# Patient Record
Sex: FEMALE | Race: WHITE | URBAN - METROPOLITAN AREA
[De-identification: names, ages, dates, MRNs, and addresses within clinical notes are randomized per-mention and may not be internally consistent; named-entity substitution may affect disease eponyms.]

---

## 2018-04-18 ENCOUNTER — DOCTOR'S OFFICE (OUTPATIENT)
Dept: URBAN - METROPOLITAN AREA CLINIC 137 | Facility: CLINIC | Age: 63
Setting detail: OPHTHALMOLOGY
End: 2018-04-18
Payer: COMMERCIAL

## 2018-04-18 ENCOUNTER — RX ONLY (RX ONLY)
Age: 63
End: 2018-04-18

## 2018-04-18 DIAGNOSIS — H52.13: ICD-10-CM

## 2018-04-18 PROCEDURE — 92004 COMPRE OPH EXAM NEW PT 1/>: CPT | Performed by: OPHTHALMOLOGY

## 2018-04-18 ASSESSMENT — REFRACTION_MANIFEST
OS_VA1: 20/
OS_VA3: 20/
OD_VA2: 20/
OD_VA2: 20/
OS_VA2: 20/
OU_VA: 20/
OS_VA1: 20/
OD_VA1: 20/
OD_VA1: 20/
OU_VA: 20/
OD_VA3: 20/
OD_VA3: 20/
OS_VA2: 20/
OS_VA3: 20/

## 2018-04-18 ASSESSMENT — REFRACTION_AUTOREFRACTION
OS_CYLINDER: +0.50
OD_SPHERE: -6.25
OS_AXIS: 110
OS_SPHERE: -6.50
OD_CYLINDER: SPH

## 2018-04-18 ASSESSMENT — REFRACTION_OUTSIDERX
OS_VA3: 20/
OD_VA2: 20/25(J1)
OD_SPHERE: -7.00
OS_ADD: +2.25
OU_VA: 20/25
OD_VA1: 20/30-1
OD_VA3: 20/
OD_ADD: +2.25
OS_SPHERE: -6.00
OS_VA1: 20/25-1
OS_VA2: 20/25(J1)

## 2018-04-18 ASSESSMENT — REFRACTION_CURRENTRX
OS_OVR_VA: 20/
OD_OVR_VA: 20/
OD_OVR_VA: 20/
OS_OVR_VA: 20/
OD_OVR_VA: 20/
OS_OVR_VA: 20/

## 2018-04-18 ASSESSMENT — CONFRONTATIONAL VISUAL FIELD TEST (CVF)
OD_FINDINGS: FULL
OS_FINDINGS: FULL

## 2018-04-18 ASSESSMENT — VISUAL ACUITY
OS_BCVA: 20/80+1
OD_BCVA: 20/50

## 2018-04-18 ASSESSMENT — SPHEQUIV_DERIVED: OS_SPHEQUIV: -6.25

## 2018-05-07 ENCOUNTER — DOCTOR'S OFFICE (OUTPATIENT)
Dept: URBAN - METROPOLITAN AREA CLINIC 137 | Facility: CLINIC | Age: 63
Setting detail: OPHTHALMOLOGY
End: 2018-05-07
Payer: COMMERCIAL

## 2018-05-07 DIAGNOSIS — H52.13: ICD-10-CM

## 2018-05-07 PROCEDURE — 92310 CONTACT LENS FITTING OU: CPT | Performed by: OPHTHALMOLOGY

## 2018-05-07 ASSESSMENT — REFRACTION_MANIFEST
OU_VA: 20/
OD_VA3: 20/
OD_VA1: 20/
OS_VA2: 20/
OS_VA2: 20/
OS_VA3: 20/
OD_VA1: 20/
OS_VA3: 20/
OD_VA3: 20/
OD_VA2: 20/
OS_VA1: 20/
OD_VA2: 20/
OS_VA1: 20/
OU_VA: 20/

## 2018-05-07 ASSESSMENT — REFRACTION_CURRENTRX
OS_OVR_VA: 20/
OD_OVR_VA: 20/

## 2018-05-07 ASSESSMENT — VISUAL ACUITY
OD_BCVA: 20/30-2
OS_BCVA: 20/30+2

## 2018-05-07 ASSESSMENT — REFRACTION_OUTSIDERX
OS_VA2: 20/25(J1)
OS_SPHERE: -6.00
OU_VA: 20/25
OS_ADD: +2.25
OD_VA1: 20/30-1
OD_VA2: 20/25(J1)
OS_VA1: 20/25-1
OD_VA3: 20/
OD_SPHERE: -7.00
OD_ADD: +2.25
OS_VA3: 20/

## 2018-05-07 ASSESSMENT — CONFRONTATIONAL VISUAL FIELD TEST (CVF)
OD_FINDINGS: FULL
OS_FINDINGS: FULL

## 2018-05-07 ASSESSMENT — REFRACTION_AUTOREFRACTION
OS_CYLINDER: +0.50
OD_SPHERE: -6.25
OD_CYLINDER: SPH
OS_AXIS: 110
OS_SPHERE: -6.50

## 2018-05-07 ASSESSMENT — SPHEQUIV_DERIVED: OS_SPHEQUIV: -6.25

## 2018-05-15 ENCOUNTER — DOCTOR'S OFFICE (OUTPATIENT)
Dept: URBAN - METROPOLITAN AREA CLINIC 136 | Facility: CLINIC | Age: 63
Setting detail: OPHTHALMOLOGY
End: 2018-05-15
Payer: COMMERCIAL

## 2018-05-15 DIAGNOSIS — H25.012: ICD-10-CM

## 2018-05-15 DIAGNOSIS — H43.811: ICD-10-CM

## 2018-05-15 DIAGNOSIS — H25.011: ICD-10-CM

## 2018-05-15 DIAGNOSIS — H04.123: ICD-10-CM

## 2018-05-15 PROCEDURE — 92014 COMPRE OPH EXAM EST PT 1/>: CPT | Performed by: OPHTHALMOLOGY

## 2018-05-15 ASSESSMENT — AXIALLENGTH_DERIVED
OD_AL: 20.7
OS_AL: 25.2256

## 2018-05-15 ASSESSMENT — REFRACTION_MANIFEST
OD_VA2: 20/
OD_VA2: 20/
OD_VA1: 20/
OD_VA3: 20/
OS_VA3: 20/
OS_VA1: 20/
OU_VA: 20/
OD_VA3: 20/
OD_VA1: 20/
OS_VA2: 20/
OU_VA: 20/
OS_VA3: 20/
OS_VA1: 20/
OS_VA2: 20/

## 2018-05-15 ASSESSMENT — SPHEQUIV_DERIVED
OS_SPHEQUIV: -6.75
OD_SPHEQUIV: -6.625

## 2018-05-15 ASSESSMENT — REFRACTION_CURRENTRX
OS_OVR_VA: 20/
OD_OVR_VA: 20/
OD_OVR_VA: 20/
OS_OVR_VA: 20/
OD_OVR_VA: 20/
OS_OVR_VA: 20/

## 2018-05-15 ASSESSMENT — REFRACTION_OUTSIDERX
OS_ADD: +2.25
OS_SPHERE: -6.00
OS_VA1: 20/25-1
OD_ADD: +2.25
OD_VA1: 20/30-1
OD_VA2: 20/25(J1)
OD_VA3: 20/
OU_VA: 20/25
OS_VA3: 20/
OD_SPHERE: -7.00
OS_VA2: 20/25(J1)

## 2018-05-15 ASSESSMENT — KERATOMETRY
OS_K1POWER_DIOPTERS: 45.75
OD_AXISANGLE_DEGREES: 110
OS_K2POWER_DIOPTERS: 47.25
OS_AXISANGLE_DEGREES: 078
METHOD_AUTO_MANUAL: AUTO
OD_K2POWER_DIOPTERS: 74.25
OD_K1POWER_DIOPTERS: 44.75

## 2018-05-15 ASSESSMENT — REFRACTION_AUTOREFRACTION
OD_CYLINDER: -1.75
OD_AXIS: 069
OS_AXIS: 171
OS_CYLINDER: -1.00
OD_SPHERE: -5.75
OS_SPHERE: -6.25

## 2018-05-15 ASSESSMENT — VISUAL ACUITY
OD_BCVA: 20/25
OS_BCVA: 20/30-1

## 2018-05-15 ASSESSMENT — CONFRONTATIONAL VISUAL FIELD TEST (CVF)
OS_FINDINGS: FULL
OD_FINDINGS: FULL

## 2018-06-01 ENCOUNTER — DOCTOR'S OFFICE (OUTPATIENT)
Dept: URBAN - METROPOLITAN AREA CLINIC 136 | Facility: CLINIC | Age: 63
Setting detail: OPHTHALMOLOGY
End: 2018-06-01
Payer: COMMERCIAL

## 2018-06-01 DIAGNOSIS — H25.011: ICD-10-CM

## 2018-06-01 PROCEDURE — CATARACT K CATARACT KIT: Performed by: OPHTHALMOLOGY

## 2018-06-01 PROCEDURE — 76519 ECHO EXAM OF EYE: CPT | Performed by: OPHTHALMOLOGY

## 2018-07-10 ENCOUNTER — DOCTOR'S OFFICE (OUTPATIENT)
Dept: URBAN - METROPOLITAN AREA CLINIC 137 | Facility: CLINIC | Age: 63
Setting detail: OPHTHALMOLOGY
End: 2018-07-10
Payer: COMMERCIAL

## 2018-07-10 DIAGNOSIS — H04.123: ICD-10-CM

## 2018-07-10 PROCEDURE — 99212 OFFICE O/P EST SF 10 MIN: CPT | Performed by: OPHTHALMOLOGY

## 2018-07-10 ASSESSMENT — REFRACTION_MANIFEST
OD_VA1: 20/
OD_VA2: 20/
OS_VA2: 20/
OD_VA3: 20/
OS_VA3: 20/
OD_VA3: 20/
OU_VA: 20/
OD_VA2: 20/
OU_VA: 20/
OS_VA2: 20/
OD_VA1: 20/
OS_VA1: 20/
OS_VA1: 20/
OS_VA3: 20/

## 2018-07-10 ASSESSMENT — REFRACTION_OUTSIDERX
OD_VA2: 20/25(J1)
OU_VA: 20/25
OD_VA3: 20/
OD_ADD: +2.25
OS_VA1: 20/25-1
OD_SPHERE: -7.00
OS_SPHERE: -6.00
OD_VA1: 20/30-1
OS_ADD: +2.25
OS_VA2: 20/25(J1)
OS_VA3: 20/

## 2018-07-10 ASSESSMENT — REFRACTION_CURRENTRX
OD_OVR_VA: 20/
OS_OVR_VA: 20/
OD_OVR_VA: 20/
OD_OVR_VA: 20/

## 2018-07-10 ASSESSMENT — REFRACTION_AUTOREFRACTION
OD_AXIS: 069
OS_CYLINDER: -1.00
OD_SPHERE: -5.75
OD_CYLINDER: -1.75
OS_AXIS: 171
OS_SPHERE: -6.25

## 2018-07-10 ASSESSMENT — CONFRONTATIONAL VISUAL FIELD TEST (CVF)
OS_FINDINGS: FULL
OD_FINDINGS: FULL

## 2018-07-10 ASSESSMENT — VISUAL ACUITY
OD_BCVA: 20/60-1
OS_BCVA: 20/30-1

## 2018-07-10 ASSESSMENT — SPHEQUIV_DERIVED
OS_SPHEQUIV: -6.75
OD_SPHEQUIV: -6.625

## 2018-07-17 ENCOUNTER — RX ONLY (RX ONLY)
Age: 63
End: 2018-07-17

## 2018-07-17 ENCOUNTER — DOCTOR'S OFFICE (OUTPATIENT)
Dept: URBAN - METROPOLITAN AREA CLINIC 136 | Facility: CLINIC | Age: 63
Setting detail: OPHTHALMOLOGY
End: 2018-07-17
Payer: COMMERCIAL

## 2018-07-17 DIAGNOSIS — H04.123: ICD-10-CM

## 2018-07-17 DIAGNOSIS — H25.011: ICD-10-CM

## 2018-07-17 DIAGNOSIS — H25.012: ICD-10-CM

## 2018-07-17 PROCEDURE — 92012 INTRM OPH EXAM EST PATIENT: CPT | Performed by: OPHTHALMOLOGY

## 2018-07-17 ASSESSMENT — CONFRONTATIONAL VISUAL FIELD TEST (CVF)
OD_FINDINGS: FULL
OS_FINDINGS: FULL

## 2018-07-17 ASSESSMENT — REFRACTION_CURRENTRX
OS_OVR_VA: 20/
OS_OVR_VA: 20/
OD_OVR_VA: 20/
OD_OVR_VA: 20/
OS_OVR_VA: 20/
OD_OVR_VA: 20/

## 2018-07-17 ASSESSMENT — REFRACTION_OUTSIDERX
OS_VA2: 20/25(J1)
OD_VA2: 20/25(J1)
OD_VA3: 20/
OD_SPHERE: -7.00
OS_ADD: +2.25
OS_SPHERE: -6.00
OD_VA1: 20/30-1
OS_VA1: 20/25-1
OU_VA: 20/25
OS_VA3: 20/
OD_ADD: +2.25

## 2018-07-17 ASSESSMENT — REFRACTION_MANIFEST
OS_VA2: 20/
OD_VA1: 20/
OD_VA3: 20/
OU_VA: 20/
OD_VA2: 20/
OD_VA2: 20/
OD_VA1: 20/
OS_VA1: 20/
OS_VA1: 20/
OS_VA3: 20/
OS_VA3: 20/
OU_VA: 20/
OS_VA2: 20/
OD_VA3: 20/

## 2018-07-17 ASSESSMENT — SUPERFICIAL PUNCTATE KERATITIS (SPK): OD_SPK: T

## 2018-07-17 ASSESSMENT — REFRACTION_AUTOREFRACTION
OS_AXIS: 171
OD_AXIS: 069
OS_SPHERE: -6.25
OD_SPHERE: -5.75
OS_CYLINDER: -1.00
OD_CYLINDER: -1.75

## 2018-07-17 ASSESSMENT — VISUAL ACUITY
OS_BCVA: 20/50-1
OD_BCVA: 20/50

## 2018-07-17 ASSESSMENT — SPHEQUIV_DERIVED
OS_SPHEQUIV: -6.75
OD_SPHEQUIV: -6.625

## 2018-07-17 ASSESSMENT — DRY EYES - PHYSICIAN NOTES
OS_GENERALCOMMENTS: LOW TEAR FILM
OD_GENERALCOMMENTS: LOW TEAR FILM

## 2018-07-26 ENCOUNTER — AMBUL SURGICAL CARE (OUTPATIENT)
Dept: URBAN - METROPOLITAN AREA SURGERY 32 | Facility: SURGERY | Age: 63
Setting detail: OPHTHALMOLOGY
End: 2018-07-26
Payer: COMMERCIAL

## 2018-07-26 DIAGNOSIS — H25.11: ICD-10-CM

## 2018-07-26 DIAGNOSIS — H25.011: ICD-10-CM

## 2018-07-26 PROCEDURE — G8918 PT W/O PREOP ORDER IV AB PRO: HCPCS | Performed by: OPHTHALMOLOGY

## 2018-07-26 PROCEDURE — 66984 XCAPSL CTRC RMVL W/O ECP: CPT | Performed by: OPHTHALMOLOGY

## 2018-07-26 PROCEDURE — G8907 PT DOC NO EVENTS ON DISCHARG: HCPCS | Performed by: OPHTHALMOLOGY

## 2018-07-27 ENCOUNTER — DOCTOR'S OFFICE (OUTPATIENT)
Dept: URBAN - METROPOLITAN AREA CLINIC 136 | Facility: CLINIC | Age: 63
Setting detail: OPHTHALMOLOGY
End: 2018-07-27
Payer: COMMERCIAL

## 2018-07-27 ENCOUNTER — RX ONLY (RX ONLY)
Age: 63
End: 2018-07-27

## 2018-07-27 DIAGNOSIS — H25.012: ICD-10-CM

## 2018-07-27 DIAGNOSIS — H25.011: ICD-10-CM

## 2018-07-27 DIAGNOSIS — Z96.1: ICD-10-CM

## 2018-07-27 PROCEDURE — 76519 ECHO EXAM OF EYE: CPT | Performed by: OPHTHALMOLOGY

## 2018-07-27 PROCEDURE — 99024 POSTOP FOLLOW-UP VISIT: CPT | Performed by: PHYSICIAN ASSISTANT

## 2018-07-27 ASSESSMENT — DRY EYES - PHYSICIAN NOTES
OS_GENERALCOMMENTS: LOW TEAR FILM
OD_GENERALCOMMENTS: LOW TEAR FILM

## 2018-07-27 ASSESSMENT — SUPERFICIAL PUNCTATE KERATITIS (SPK): OD_SPK: ABSENT

## 2018-07-30 ASSESSMENT — KERATOMETRY
METHOD_AUTO_MANUAL: AUTO
OD_AXISANGLE_DEGREES: 20
OS_K2POWER_DIOPTERS: 47.25
OD_K1POWER_DIOPTERS: 45.25
OS_AXISANGLE_DEGREES: 078
OS_K1POWER_DIOPTERS: 25
OD_K2POWER_DIOPTERS: 47.25

## 2018-07-30 ASSESSMENT — REFRACTION_MANIFEST
OS_VA1: 20/
OD_VA3: 20/
OD_VA1: 20/
OS_VA3: 20/
OD_VA1: 20/
OS_VA3: 20/
OS_VA1: 20/
OD_VA3: 20/
OD_VA2: 20/
OD_VA2: 20/
OU_VA: 20/
OS_VA2: 20/
OS_VA2: 20/
OU_VA: 20/

## 2018-07-30 ASSESSMENT — SPHEQUIV_DERIVED
OD_SPHEQUIV: 0.375
OS_SPHEQUIV: -6.75

## 2018-07-30 ASSESSMENT — AXIALLENGTH_DERIVED
OD_AL: 22.4897
OS_AL: 30.49

## 2018-07-30 ASSESSMENT — REFRACTION_OUTSIDERX
OS_VA1: 20/25-1
OS_ADD: +2.25
OD_VA1: 20/30-1
OD_VA3: 20/
OD_SPHERE: -7.00
OD_VA2: 20/25(J1)
OS_SPHERE: -6.00
OD_ADD: +2.25
OS_VA2: 20/25(J1)
OU_VA: 20/25
OS_VA3: 20/

## 2018-07-30 ASSESSMENT — REFRACTION_CURRENTRX
OD_OVR_VA: 20/
OD_OVR_VA: 20/
OS_OVR_VA: 20/
OD_OVR_VA: 20/

## 2018-07-30 ASSESSMENT — VISUAL ACUITY
OD_BCVA: 20/50
OS_BCVA: 20/50-1

## 2018-07-30 ASSESSMENT — REFRACTION_AUTOREFRACTION
OD_SPHERE: +1.25
OS_SPHERE: -6.25
OS_AXIS: 171
OD_AXIS: 33
OD_CYLINDER: -1.75
OS_CYLINDER: -1.00

## 2018-07-31 ENCOUNTER — DOCTOR'S OFFICE (OUTPATIENT)
Dept: URBAN - METROPOLITAN AREA CLINIC 136 | Facility: CLINIC | Age: 63
Setting detail: OPHTHALMOLOGY
End: 2018-07-31
Payer: COMMERCIAL

## 2018-07-31 DIAGNOSIS — H25.011: ICD-10-CM

## 2018-07-31 DIAGNOSIS — H25.012: ICD-10-CM

## 2018-07-31 DIAGNOSIS — Z96.1: ICD-10-CM

## 2018-07-31 PROCEDURE — 99024 POSTOP FOLLOW-UP VISIT: CPT | Performed by: OPHTHALMOLOGY

## 2018-07-31 ASSESSMENT — REFRACTION_OUTSIDERX
OD_ADD: +2.25
OD_VA2: 20/25(J1)
OD_VA3: 20/
OS_SPHERE: -6.00
OS_VA3: 20/
OS_ADD: +2.25
OS_VA2: 20/25(J1)
OD_VA1: 20/30-1
OU_VA: 20/25
OD_SPHERE: -7.00
OS_VA1: 20/25-1

## 2018-07-31 ASSESSMENT — REFRACTION_AUTOREFRACTION
OD_AXIS: 33
OS_CYLINDER: -1.00
OS_SPHERE: -6.25
OD_SPHERE: +1.25
OD_CYLINDER: -1.75
OS_AXIS: 171

## 2018-07-31 ASSESSMENT — REFRACTION_CURRENTRX
OD_OVR_VA: 20/
OD_OVR_VA: 20/
OS_OVR_VA: 20/
OD_OVR_VA: 20/

## 2018-07-31 ASSESSMENT — REFRACTION_MANIFEST
OS_VA1: 20/
OD_VA1: 20/
OU_VA: 20/
OS_VA3: 20/
OD_VA3: 20/
OU_VA: 20/
OD_VA1: 20/
OS_VA3: 20/
OS_VA1: 20/
OD_VA2: 20/
OS_VA2: 20/
OS_VA2: 20/
OD_VA2: 20/
OD_VA3: 20/

## 2018-07-31 ASSESSMENT — SPHEQUIV_DERIVED
OS_SPHEQUIV: -6.75
OD_SPHEQUIV: 0.375

## 2018-07-31 ASSESSMENT — DRY EYES - PHYSICIAN NOTES
OD_GENERALCOMMENTS: LOW TEAR FILM
OS_GENERALCOMMENTS: LOW TEAR FILM

## 2018-07-31 ASSESSMENT — VISUAL ACUITY
OS_BCVA: 20/30-1
OD_BCVA: CF 3FT

## 2018-07-31 ASSESSMENT — CONFRONTATIONAL VISUAL FIELD TEST (CVF)
OD_FINDINGS: FULL
OS_FINDINGS: FULL

## 2018-07-31 ASSESSMENT — SUPERFICIAL PUNCTATE KERATITIS (SPK): OD_SPK: 1+ 2+

## 2018-08-01 ENCOUNTER — AMBUL SURGICAL CARE (OUTPATIENT)
Dept: URBAN - METROPOLITAN AREA SURGERY 32 | Facility: SURGERY | Age: 63
Setting detail: OPHTHALMOLOGY
End: 2018-08-01
Payer: COMMERCIAL

## 2018-08-01 DIAGNOSIS — H25.12: ICD-10-CM

## 2018-08-01 DIAGNOSIS — H25.012: ICD-10-CM

## 2018-08-01 PROCEDURE — G8918 PT W/O PREOP ORDER IV AB PRO: HCPCS | Performed by: OPHTHALMOLOGY

## 2018-08-01 PROCEDURE — G8907 PT DOC NO EVENTS ON DISCHARG: HCPCS | Performed by: OPHTHALMOLOGY

## 2018-08-01 PROCEDURE — 66984 XCAPSL CTRC RMVL W/O ECP: CPT | Performed by: OPHTHALMOLOGY

## 2018-08-02 ENCOUNTER — DOCTOR'S OFFICE (OUTPATIENT)
Dept: URBAN - METROPOLITAN AREA CLINIC 136 | Facility: CLINIC | Age: 63
Setting detail: OPHTHALMOLOGY
End: 2018-08-02
Payer: COMMERCIAL

## 2018-08-02 DIAGNOSIS — Z96.1: ICD-10-CM

## 2018-08-02 PROBLEM — H25.011 CATARACT; RIGHT EYE, LEFT EYE: Status: RESOLVED | Noted: 2018-05-15 | Resolved: 2018-08-02

## 2018-08-02 PROBLEM — H25.012 CATARACT; RIGHT EYE, LEFT EYE: Status: RESOLVED | Noted: 2018-05-15 | Resolved: 2018-08-02

## 2018-08-02 PROCEDURE — 99024 POSTOP FOLLOW-UP VISIT: CPT | Performed by: OPTOMETRIST

## 2018-08-02 ASSESSMENT — REFRACTION_MANIFEST
OS_VA2: 20/
OD_VA1: 20/
OS_VA3: 20/
OU_VA: 20/
OD_VA1: 20/
OS_VA3: 20/
OS_VA1: 20/
OU_VA: 20/
OD_VA3: 20/
OD_VA2: 20/
OD_VA2: 20/
OD_VA3: 20/
OS_VA2: 20/
OS_VA1: 20/

## 2018-08-02 ASSESSMENT — VISUAL ACUITY
OS_BCVA: 20/30-1
OD_BCVA: 20/20-2

## 2018-08-02 ASSESSMENT — REFRACTION_OUTSIDERX
OS_ADD: +2.25
OS_SPHERE: -6.00
OS_VA3: 20/
OS_VA2: 20/25(J1)
OS_VA1: 20/25-1
OD_VA3: 20/
OD_SPHERE: -7.00
OU_VA: 20/25
OD_VA1: 20/30-1
OD_ADD: +2.25
OD_VA2: 20/25(J1)

## 2018-08-02 ASSESSMENT — REFRACTION_AUTOREFRACTION
OS_SPHERE: +0.25
OD_SPHERE: +1.00
OS_AXIS: 156
OD_AXIS: 27
OD_CYLINDER: -1.50
OS_CYLINDER: -0.75

## 2018-08-02 ASSESSMENT — KERATOMETRY
OS_K2POWER_DIOPTERS: 47.25
METHOD_AUTO_MANUAL: AUTO
OS_AXISANGLE_DEGREES: 078
OD_AXISANGLE_DEGREES: 20
OD_K1POWER_DIOPTERS: 45.25
OD_K2POWER_DIOPTERS: 47.25
OS_K1POWER_DIOPTERS: 25

## 2018-08-02 ASSESSMENT — REFRACTION_CURRENTRX
OS_OVR_VA: 20/
OS_OVR_VA: 20/
OD_OVR_VA: 20/
OD_OVR_VA: 20/
OS_OVR_VA: 20/
OD_OVR_VA: 20/

## 2018-08-02 ASSESSMENT — AXIALLENGTH_DERIVED
OS_AL: 26.71
OD_AL: 22.5341

## 2018-08-02 ASSESSMENT — DRY EYES - PHYSICIAN NOTES
OD_GENERALCOMMENTS: LOW TEAR FILM
OS_GENERALCOMMENTS: LOW TEAR FILM

## 2018-08-02 ASSESSMENT — SPHEQUIV_DERIVED
OS_SPHEQUIV: -0.125
OD_SPHEQUIV: 0.25

## 2018-08-02 ASSESSMENT — SUPERFICIAL PUNCTATE KERATITIS (SPK): OD_SPK: 1+ 2+

## 2018-08-07 ENCOUNTER — DOCTOR'S OFFICE (OUTPATIENT)
Dept: URBAN - METROPOLITAN AREA CLINIC 136 | Facility: CLINIC | Age: 63
Setting detail: OPHTHALMOLOGY
End: 2018-08-07
Payer: COMMERCIAL

## 2018-08-07 DIAGNOSIS — Z96.1: ICD-10-CM

## 2018-08-07 PROCEDURE — 99024 POSTOP FOLLOW-UP VISIT: CPT | Performed by: OPHTHALMOLOGY

## 2018-08-07 ASSESSMENT — REFRACTION_MANIFEST
OD_VA3: 20/
OD_VA2: 20/
OD_VA1: 20/
OS_VA3: 20/
OS_VA2: 20/
OS_VA3: 20/
OU_VA: 20/
OS_VA2: 20/
OS_VA1: 20/
OD_VA1: 20/
OS_VA1: 20/
OD_VA2: 20/
OU_VA: 20/
OD_VA3: 20/

## 2018-08-07 ASSESSMENT — REFRACTION_AUTOREFRACTION
OD_SPHERE: +0.25
OD_AXIS: 28
OS_SPHERE: -1.00
OD_CYLINDER: -1.75
OS_CYLINDER: -0.75
OS_AXIS: 169

## 2018-08-07 ASSESSMENT — KERATOMETRY
OD_AXISANGLE_DEGREES: 20
METHOD_AUTO_MANUAL: AUTO
OS_AXISANGLE_DEGREES: 078
OD_K2POWER_DIOPTERS: 47.25
OS_K2POWER_DIOPTERS: 47.25
OD_K1POWER_DIOPTERS: 45.25
OS_K1POWER_DIOPTERS: 25

## 2018-08-07 ASSESSMENT — REFRACTION_CURRENTRX
OD_OVR_VA: 20/
OS_OVR_VA: 20/
OD_OVR_VA: 20/
OS_OVR_VA: 20/
OS_OVR_VA: 20/
OD_OVR_VA: 20/

## 2018-08-07 ASSESSMENT — DRY EYES - PHYSICIAN NOTES
OS_GENERALCOMMENTS: LOW TEAR FILM
OD_GENERALCOMMENTS: LOW TEAR FILM

## 2018-08-07 ASSESSMENT — REFRACTION_OUTSIDERX
OS_VA3: 20/
OS_ADD: +2.25
OD_ADD: +2.25
OU_VA: 20/25
OS_VA2: 20/25(J1)
OS_VA1: 20/25-1
OD_VA1: 20/30-1
OD_VA3: 20/
OD_SPHERE: -7.00
OD_VA2: 20/25(J1)
OS_SPHERE: -6.00

## 2018-08-07 ASSESSMENT — SUPERFICIAL PUNCTATE KERATITIS (SPK): OD_SPK: 1+ 2+

## 2018-08-07 ASSESSMENT — AXIALLENGTH_DERIVED
OS_AL: 27.35
OD_AL: 22.8494

## 2018-08-07 ASSESSMENT — VISUAL ACUITY
OS_BCVA: 20/20-2
OD_BCVA: 20/25+2

## 2018-08-07 ASSESSMENT — SPHEQUIV_DERIVED
OS_SPHEQUIV: -1.375
OD_SPHEQUIV: -0.625

## 2018-10-29 ENCOUNTER — DOCTOR'S OFFICE (OUTPATIENT)
Dept: URBAN - METROPOLITAN AREA CLINIC 136 | Facility: CLINIC | Age: 63
Setting detail: OPHTHALMOLOGY
End: 2018-10-29
Payer: COMMERCIAL

## 2018-10-29 ENCOUNTER — RX ONLY (RX ONLY)
Age: 63
End: 2018-10-29

## 2018-10-29 DIAGNOSIS — H04.123: ICD-10-CM

## 2018-10-29 DIAGNOSIS — H43.811: ICD-10-CM

## 2018-10-29 DIAGNOSIS — Z96.1: ICD-10-CM

## 2018-10-29 PROCEDURE — 99024 POSTOP FOLLOW-UP VISIT: CPT | Performed by: OPHTHALMOLOGY

## 2018-10-29 ASSESSMENT — REFRACTION_AUTOREFRACTION
OD_AXIS: 014
OS_SPHERE: -0.50
OD_SPHERE: +1.00
OS_AXIS: 167
OD_CYLINDER: -1.25
OS_CYLINDER: -0.25

## 2018-10-29 ASSESSMENT — CONFRONTATIONAL VISUAL FIELD TEST (CVF)
OD_FINDINGS: FULL
OS_FINDINGS: FULL

## 2018-10-29 ASSESSMENT — REFRACTION_MANIFEST
OU_VA: 20/25
OS_VA3: 20/
OS_SPHERE: -6.00
OS_VA2: 20/25(J1)
OS_ADD: +2.25
OD_ADD: +2.25
OD_VA1: 20/30-1
OD_VA1: 20/
OS_VA1: 20/
OD_VA3: 20/
OS_VA2: 20/
OS_VA3: 20/
OD_VA2: 20/
OS_VA1: 20/25-1
OD_VA3: 20/
OD_VA2: 20/25(J1)
OU_VA: 20/
OD_SPHERE: -7.00

## 2018-10-29 ASSESSMENT — REFRACTION_CURRENTRX
OS_OVR_VA: 20/
OD_OVR_VA: 20/
OD_OVR_VA: 20/
OS_OVR_VA: 20/
OD_OVR_VA: 20/
OS_OVR_VA: 20/

## 2018-10-29 ASSESSMENT — KERATOMETRY
OD_AXISANGLE_DEGREES: 20
OS_K2POWER_DIOPTERS: 47.25
OD_K2POWER_DIOPTERS: 47.25
OS_AXISANGLE_DEGREES: 078
OS_K1POWER_DIOPTERS: 25
METHOD_AUTO_MANUAL: AUTO
OD_K1POWER_DIOPTERS: 45.25

## 2018-10-29 ASSESSMENT — VISUAL ACUITY
OD_BCVA: 20/25
OS_BCVA: 20/25-2

## 2018-10-29 ASSESSMENT — AXIALLENGTH_DERIVED
OD_AL: 22.4897
OS_AL: 26.97

## 2018-10-29 ASSESSMENT — DRY EYES - PHYSICIAN NOTES
OS_GENERALCOMMENTS: LOW TEAR FILM
OD_GENERALCOMMENTS: LOW TEAR FILM

## 2018-10-29 ASSESSMENT — SPHEQUIV_DERIVED
OD_SPHEQUIV: 0.375
OS_SPHEQUIV: -0.625

## 2018-10-29 ASSESSMENT — SUPERFICIAL PUNCTATE KERATITIS (SPK): OD_SPK: 1+ 2+

## 2018-11-28 ENCOUNTER — DOCTOR'S OFFICE (OUTPATIENT)
Dept: URBAN - METROPOLITAN AREA CLINIC 136 | Facility: CLINIC | Age: 63
Setting detail: OPHTHALMOLOGY
End: 2018-11-28
Payer: COMMERCIAL

## 2018-11-28 DIAGNOSIS — H01.005: ICD-10-CM

## 2018-11-28 DIAGNOSIS — H00.14: ICD-10-CM

## 2018-11-28 DIAGNOSIS — H01.001: ICD-10-CM

## 2018-11-28 DIAGNOSIS — H01.004: ICD-10-CM

## 2018-11-28 DIAGNOSIS — H01.002: ICD-10-CM

## 2018-11-28 PROCEDURE — 92012 INTRM OPH EXAM EST PATIENT: CPT | Performed by: OPHTHALMOLOGY

## 2018-11-28 ASSESSMENT — CONFRONTATIONAL VISUAL FIELD TEST (CVF)
OS_FINDINGS: FULL
OD_FINDINGS: FULL

## 2018-11-28 ASSESSMENT — REFRACTION_MANIFEST
OD_VA1: 20/
OD_VA3: 20/
OD_ADD: +2.25
OS_VA3: 20/
OD_VA1: 20/30-1
OU_VA: 20/
OS_VA1: 20/
OD_SPHERE: -7.00
OD_VA2: 20/
OS_VA2: 20/25(J1)
OS_VA1: 20/25-1
OS_VA2: 20/
OS_ADD: +2.25
OS_VA3: 20/
OU_VA: 20/25
OD_VA2: 20/25(J1)
OD_VA3: 20/
OS_SPHERE: -6.00

## 2018-11-28 ASSESSMENT — SUPERFICIAL PUNCTATE KERATITIS (SPK): OD_SPK: 1+ 2+

## 2018-11-28 ASSESSMENT — REFRACTION_AUTOREFRACTION
OS_CYLINDER: -0.25
OD_AXIS: 014
OD_SPHERE: +1.00
OS_SPHERE: -0.50
OS_AXIS: 167
OD_CYLINDER: -1.25

## 2018-11-28 ASSESSMENT — REFRACTION_CURRENTRX
OD_OVR_VA: 20/
OD_OVR_VA: 20/
OS_OVR_VA: 20/
OS_OVR_VA: 20/
OD_OVR_VA: 20/
OS_OVR_VA: 20/

## 2018-11-28 ASSESSMENT — SPHEQUIV_DERIVED
OD_SPHEQUIV: 0.375
OS_SPHEQUIV: -0.625

## 2018-11-28 ASSESSMENT — VISUAL ACUITY
OS_BCVA: 20/25-2
OD_BCVA: 20/25

## 2018-11-28 ASSESSMENT — DRY EYES - PHYSICIAN NOTES
OS_GENERALCOMMENTS: LOW TEAR FILM
OD_GENERALCOMMENTS: LOW TEAR FILM

## 2018-11-28 ASSESSMENT — LID EXAM ASSESSMENTS
OD_BLEPHARITIS: 2+
OS_BLEPHARITIS: 2+

## 2018-12-11 ENCOUNTER — RX ONLY (RX ONLY)
Age: 63
End: 2018-12-11

## 2018-12-11 ENCOUNTER — DOCTOR'S OFFICE (OUTPATIENT)
Dept: URBAN - METROPOLITAN AREA CLINIC 136 | Facility: CLINIC | Age: 63
Setting detail: OPHTHALMOLOGY
End: 2018-12-11
Payer: COMMERCIAL

## 2018-12-11 DIAGNOSIS — H01.005: ICD-10-CM

## 2018-12-11 DIAGNOSIS — H01.001: ICD-10-CM

## 2018-12-11 DIAGNOSIS — H00.14: ICD-10-CM

## 2018-12-11 DIAGNOSIS — H01.002: ICD-10-CM

## 2018-12-11 DIAGNOSIS — H01.004: ICD-10-CM

## 2018-12-11 PROBLEM — H52.13 MYOPIA OU; BOTH EYES: Status: ACTIVE | Noted: 2018-04-18

## 2018-12-11 PROBLEM — H43.811 POST VITREOUS DETACHMENT ; RIGHT EYE: Status: ACTIVE | Noted: 2018-05-15

## 2018-12-11 PROBLEM — H04.123 DRY EYE; BOTH EYES: Status: ACTIVE | Noted: 2018-04-18

## 2018-12-11 PROCEDURE — 92012 INTRM OPH EXAM EST PATIENT: CPT | Performed by: OPHTHALMOLOGY

## 2018-12-11 ASSESSMENT — REFRACTION_MANIFEST
OD_VA1: 20/30-1
OD_VA3: 20/
OD_VA3: 20/
OS_VA1: 20/
OS_VA2: 20/25(J1)
OD_SPHERE: -7.00
OS_VA3: 20/
OS_VA3: 20/
OU_VA: 20/
OS_VA2: 20/
OS_ADD: +2.25
OD_VA2: 20/25(J1)
OS_SPHERE: -6.00
OU_VA: 20/25
OD_ADD: +2.25
OD_VA2: 20/
OS_VA1: 20/25-1
OD_VA1: 20/

## 2018-12-11 ASSESSMENT — REFRACTION_CURRENTRX
OD_OVR_VA: 20/
OS_OVR_VA: 20/
OD_OVR_VA: 20/
OD_OVR_VA: 20/

## 2018-12-11 ASSESSMENT — VISUAL ACUITY
OS_BCVA: 20/25-2
OD_BCVA: 20/25-2

## 2018-12-11 ASSESSMENT — REFRACTION_AUTOREFRACTION
OD_CYLINDER: -1.25
OS_AXIS: 167
OS_CYLINDER: -0.25
OS_SPHERE: -0.50
OD_AXIS: 014
OD_SPHERE: +1.00

## 2018-12-11 ASSESSMENT — DRY EYES - PHYSICIAN NOTES
OD_GENERALCOMMENTS: LOW TEAR FILM
OS_GENERALCOMMENTS: LOW TEAR FILM

## 2018-12-11 ASSESSMENT — LID EXAM ASSESSMENTS
OS_BLEPHARITIS: 2+
OD_BLEPHARITIS: 2+

## 2018-12-11 ASSESSMENT — SPHEQUIV_DERIVED
OS_SPHEQUIV: -0.625
OD_SPHEQUIV: 0.375

## 2018-12-11 ASSESSMENT — SUPERFICIAL PUNCTATE KERATITIS (SPK): OD_SPK: 1+ 2+

## 2020-01-01 ENCOUNTER — HOSPITAL ENCOUNTER (INPATIENT)
Facility: HOSPITAL | Age: 65
LOS: 7 days | Discharge: HOME | DRG: 885 | End: 2020-10-05
Attending: PSYCHIATRY & NEUROLOGY | Admitting: PSYCHIATRY & NEUROLOGY
Payer: MEDICARE

## 2020-01-01 ENCOUNTER — TELEPHONE (OUTPATIENT)
Dept: OTHER | Facility: OTHER | Age: 65
End: 2020-01-01

## 2020-01-01 ENCOUNTER — HOSPITAL ENCOUNTER (INPATIENT)
Facility: HOSPITAL | Age: 65
LOS: 5 days | DRG: 640 | End: 2020-09-28
Attending: EMERGENCY MEDICINE | Admitting: INTERNAL MEDICINE
Payer: MEDICARE

## 2020-01-01 VITALS
HEIGHT: 59 IN | DIASTOLIC BLOOD PRESSURE: 75 MMHG | SYSTOLIC BLOOD PRESSURE: 111 MMHG | WEIGHT: 68.12 LBS | TEMPERATURE: 97.8 F | OXYGEN SATURATION: 98 % | HEART RATE: 63 BPM | BODY MASS INDEX: 13.73 KG/M2 | RESPIRATION RATE: 16 BRPM

## 2020-01-01 VITALS
OXYGEN SATURATION: 97 % | BODY MASS INDEX: 12.76 KG/M2 | WEIGHT: 65 LBS | HEIGHT: 60 IN | HEART RATE: 67 BPM | SYSTOLIC BLOOD PRESSURE: 91 MMHG | RESPIRATION RATE: 18 BRPM | DIASTOLIC BLOOD PRESSURE: 60 MMHG | TEMPERATURE: 98 F

## 2020-01-01 DIAGNOSIS — D64.9 ANEMIA: ICD-10-CM

## 2020-01-01 DIAGNOSIS — E89.3 HYPOPITUITARISM AFTER PROCEDURE (HCC): ICD-10-CM

## 2020-01-01 DIAGNOSIS — R63.0 ANOREXIA: ICD-10-CM

## 2020-01-01 DIAGNOSIS — E43 SEVERE PROTEIN-CALORIE MALNUTRITION (HCC): ICD-10-CM

## 2020-01-01 DIAGNOSIS — D50.9 IRON DEFICIENCY ANEMIA: ICD-10-CM

## 2020-01-01 DIAGNOSIS — F41.0 PANIC DISORDER WITHOUT AGORAPHOBIA: ICD-10-CM

## 2020-01-01 DIAGNOSIS — E87.1 HYPONATREMIA: ICD-10-CM

## 2020-01-01 DIAGNOSIS — E03.9 HYPOTHYROID: ICD-10-CM

## 2020-01-01 DIAGNOSIS — E87.1 HYPONATREMIA: Primary | ICD-10-CM

## 2020-01-01 DIAGNOSIS — F17.200 NICOTINE DEPENDENCE: ICD-10-CM

## 2020-01-01 DIAGNOSIS — R62.7 FTT (FAILURE TO THRIVE) IN ADULT: ICD-10-CM

## 2020-01-01 DIAGNOSIS — T14.91XA SUICIDE ATTEMPT (HCC): ICD-10-CM

## 2020-01-01 DIAGNOSIS — F33.2 SEVERE EPISODE OF RECURRENT MAJOR DEPRESSIVE DISORDER, WITHOUT PSYCHOTIC FEATURES (HCC): Primary | ICD-10-CM

## 2020-01-01 DIAGNOSIS — I10 HYPERTENSION: ICD-10-CM

## 2020-01-01 DIAGNOSIS — F32.A DEPRESSION: ICD-10-CM

## 2020-01-01 DIAGNOSIS — F43.10 PTSD (POST-TRAUMATIC STRESS DISORDER): ICD-10-CM

## 2020-01-01 DIAGNOSIS — F50.00 ANOREXIA NERVOSA WITH SIGNIFICANTLY LOW BODY WEIGHT: ICD-10-CM

## 2020-01-01 LAB
ALBUMIN SERPL BCP-MCNC: 3.8 G/DL (ref 3.4–4.8)
ALP SERPL-CCNC: 25.9 U/L (ref 35–140)
ALT SERPL W P-5'-P-CCNC: 7 U/L (ref 5–54)
AMPHETAMINES SERPL QL SCN: NEGATIVE
ANION GAP SERPL CALCULATED.3IONS-SCNC: 0 MMOL/L (ref 5–14)
ANION GAP SERPL CALCULATED.3IONS-SCNC: 1 MMOL/L (ref 5–14)
ANION GAP SERPL CALCULATED.3IONS-SCNC: 1 MMOL/L (ref 5–14)
ANION GAP SERPL CALCULATED.3IONS-SCNC: 3 MMOL/L (ref 4–13)
ANION GAP SERPL CALCULATED.3IONS-SCNC: 5 MMOL/L (ref 4–13)
ANION GAP SERPL CALCULATED.3IONS-SCNC: 5 MMOL/L (ref 4–13)
ANION GAP SERPL CALCULATED.3IONS-SCNC: 6 MMOL/L (ref 4–13)
ANION GAP SERPL CALCULATED.3IONS-SCNC: 7 MMOL/L (ref 4–13)
ANION GAP SERPL CALCULATED.3IONS-SCNC: 7 MMOL/L (ref 4–13)
ANION GAP SERPL CALCULATED.3IONS-SCNC: 8 MMOL/L (ref 4–13)
ANION GAP SERPL CALCULATED.3IONS-SCNC: 9 MMOL/L (ref 4–13)
AST SERPL W P-5'-P-CCNC: 24 U/L (ref 15–41)
ATRIAL RATE: 80 BPM
BACTERIA UR QL AUTO: NORMAL /HPF
BARBITURATES UR QL: NEGATIVE
BASOPHILS # BLD AUTO: 0 THOUSANDS/ΜL (ref 0–0.1)
BASOPHILS # BLD AUTO: 0.02 THOUSANDS/ΜL (ref 0–0.1)
BASOPHILS # BLD AUTO: 0.03 THOUSANDS/ΜL (ref 0–0.1)
BASOPHILS # BLD AUTO: 0.05 THOUSANDS/ΜL (ref 0–0.1)
BASOPHILS NFR BLD AUTO: 0 % (ref 0–1)
BASOPHILS NFR BLD AUTO: 0 % (ref 0–1)
BASOPHILS NFR BLD AUTO: 1 % (ref 0–1)
BASOPHILS NFR BLD AUTO: 1 % (ref 0–1)
BENZODIAZ UR QL: NEGATIVE
BILIRUB SERPL-MCNC: 0.47 MG/DL (ref 0.3–1.2)
BILIRUB UR QL STRIP: NEGATIVE
BUN SERPL-MCNC: 10 MG/DL (ref 5–25)
BUN SERPL-MCNC: 11 MG/DL (ref 5–25)
BUN SERPL-MCNC: 13 MG/DL (ref 5–25)
BUN SERPL-MCNC: 4 MG/DL (ref 6–20)
BUN SERPL-MCNC: 5 MG/DL (ref 6–20)
BUN SERPL-MCNC: 6 MG/DL (ref 6–20)
BUN SERPL-MCNC: 8 MG/DL (ref 6–20)
CALCIUM SERPL-MCNC: 7.9 MG/DL (ref 8.4–10.2)
CALCIUM SERPL-MCNC: 7.9 MG/DL (ref 8.4–10.2)
CALCIUM SERPL-MCNC: 8.1 MG/DL (ref 8.4–10.2)
CALCIUM SERPL-MCNC: 8.1 MG/DL (ref 8.4–10.2)
CALCIUM SERPL-MCNC: 8.2 MG/DL (ref 8.4–10.2)
CALCIUM SERPL-MCNC: 8.2 MG/DL (ref 8.4–10.2)
CALCIUM SERPL-MCNC: 8.4 MG/DL (ref 8.4–10.2)
CALCIUM SERPL-MCNC: 8.5 MG/DL (ref 8.4–10.2)
CALCIUM SERPL-MCNC: 8.6 MG/DL (ref 8.4–10.2)
CALCIUM SERPL-MCNC: 8.6 MG/DL (ref 8.4–10.2)
CALCIUM SERPL-MCNC: 8.7 MG/DL (ref 8.4–10.2)
CALCIUM SERPL-MCNC: 8.8 MG/DL (ref 8.4–10.2)
CHLORIDE SERPL-SCNC: 100 MMOL/L (ref 96–108)
CHLORIDE SERPL-SCNC: 100 MMOL/L (ref 97–108)
CHLORIDE SERPL-SCNC: 101 MMOL/L (ref 96–108)
CHLORIDE SERPL-SCNC: 101 MMOL/L (ref 97–108)
CHLORIDE SERPL-SCNC: 102 MMOL/L (ref 96–108)
CHLORIDE SERPL-SCNC: 103 MMOL/L (ref 96–108)
CHLORIDE SERPL-SCNC: 105 MMOL/L (ref 96–108)
CHLORIDE SERPL-SCNC: 105 MMOL/L (ref 97–108)
CHLORIDE SERPL-SCNC: 91 MMOL/L (ref 96–108)
CHLORIDE SERPL-SCNC: 92 MMOL/L (ref 96–108)
CHLORIDE SERPL-SCNC: 94 MMOL/L (ref 96–108)
CHLORIDE SERPL-SCNC: 96 MMOL/L (ref 96–108)
CHLORIDE SERPL-SCNC: 97 MMOL/L (ref 96–108)
CHLORIDE SERPL-SCNC: 99 MMOL/L (ref 96–108)
CHOLEST SERPL-MCNC: 158 MG/DL
CK SERPL-CCNC: 112.8 U/L (ref 38–234)
CLARITY UR: CLEAR
CO2 SERPL-SCNC: 22 MMOL/L (ref 22–33)
CO2 SERPL-SCNC: 24 MMOL/L (ref 22–33)
CO2 SERPL-SCNC: 25 MMOL/L (ref 22–33)
CO2 SERPL-SCNC: 26 MMOL/L (ref 22–33)
CO2 SERPL-SCNC: 27 MMOL/L (ref 22–33)
CO2 SERPL-SCNC: 29 MMOL/L (ref 22–33)
CO2 SERPL-SCNC: 31 MMOL/L (ref 22–33)
CO2 SERPL-SCNC: 32 MMOL/L (ref 22–30)
CO2 SERPL-SCNC: 33 MMOL/L (ref 22–30)
CO2 SERPL-SCNC: 34 MMOL/L (ref 22–30)
COCAINE UR QL: NEGATIVE
COLOR UR: YELLOW
CREAT SERPL-MCNC: 0.46 MG/DL (ref 0.4–1.1)
CREAT SERPL-MCNC: 0.47 MG/DL (ref 0.6–1.2)
CREAT SERPL-MCNC: 0.48 MG/DL (ref 0.6–1.2)
CREAT SERPL-MCNC: 0.49 MG/DL (ref 0.4–1.1)
CREAT SERPL-MCNC: 0.5 MG/DL (ref 0.6–1.2)
CREAT SERPL-MCNC: 0.53 MG/DL (ref 0.4–1.1)
CREAT SERPL-MCNC: 0.58 MG/DL (ref 0.4–1.1)
CREAT SERPL-MCNC: 0.59 MG/DL (ref 0.4–1.1)
CREAT SERPL-MCNC: 0.6 MG/DL (ref 0.4–1.1)
CREAT SERPL-MCNC: 0.61 MG/DL (ref 0.4–1.1)
CREAT SERPL-MCNC: 0.61 MG/DL (ref 0.4–1.1)
CREAT SERPL-MCNC: 0.62 MG/DL (ref 0.4–1.1)
CREAT SERPL-MCNC: 0.63 MG/DL (ref 0.4–1.1)
CREAT SERPL-MCNC: 0.69 MG/DL (ref 0.4–1.1)
EOSINOPHIL # BLD AUTO: 0.02 THOUSAND/ΜL (ref 0–0.61)
EOSINOPHIL # BLD AUTO: 0.05 THOUSAND/ΜL (ref 0–0.61)
EOSINOPHIL # BLD AUTO: 0.09 THOUSAND/ΜL (ref 0–0.61)
EOSINOPHIL # BLD AUTO: 0.1 THOUSAND/ΜL (ref 0–0.61)
EOSINOPHIL NFR BLD AUTO: 0 % (ref 0–6)
EOSINOPHIL NFR BLD AUTO: 1 % (ref 0–6)
EOSINOPHIL NFR BLD AUTO: 2 % (ref 0–6)
EOSINOPHIL NFR BLD AUTO: 2 % (ref 0–6)
ERYTHROCYTE [DISTWIDTH] IN BLOOD BY AUTOMATED COUNT: 14.4 % (ref 11.6–15.1)
ERYTHROCYTE [DISTWIDTH] IN BLOOD BY AUTOMATED COUNT: 14.5 % (ref 11.6–15.1)
ERYTHROCYTE [DISTWIDTH] IN BLOOD BY AUTOMATED COUNT: 14.8 % (ref 11.6–15.1)
ERYTHROCYTE [DISTWIDTH] IN BLOOD BY AUTOMATED COUNT: 14.9 % (ref 11.6–15.1)
ETHANOL SERPL-MCNC: <10 MG/DL
FERRITIN SERPL-MCNC: 154 NG/ML (ref 8–388)
FOLATE SERPL-MCNC: 3.7 NG/ML (ref 3.1–17.5)
GFR SERPL CREATININE-BSD FRML MDRD: 100 ML/MIN/1.73SQ M
GFR SERPL CREATININE-BSD FRML MDRD: 102 ML/MIN/1.73SQ M
GFR SERPL CREATININE-BSD FRML MDRD: 103 ML/MIN/1.73SQ M
GFR SERPL CREATININE-BSD FRML MDRD: 103 ML/MIN/1.73SQ M
GFR SERPL CREATININE-BSD FRML MDRD: 104 ML/MIN/1.73SQ M
GFR SERPL CREATININE-BSD FRML MDRD: 105 ML/MIN/1.73SQ M
GFR SERPL CREATININE-BSD FRML MDRD: 92 ML/MIN/1.73SQ M
GFR SERPL CREATININE-BSD FRML MDRD: 94 ML/MIN/1.73SQ M
GFR SERPL CREATININE-BSD FRML MDRD: 95 ML/MIN/1.73SQ M
GFR SERPL CREATININE-BSD FRML MDRD: 96 ML/MIN/1.73SQ M
GFR SERPL CREATININE-BSD FRML MDRD: 96 ML/MIN/1.73SQ M
GFR SERPL CREATININE-BSD FRML MDRD: 97 ML/MIN/1.73SQ M
GLUCOSE SERPL-MCNC: 115 MG/DL (ref 65–140)
GLUCOSE SERPL-MCNC: 189 MG/DL (ref 65–140)
GLUCOSE SERPL-MCNC: 254 MG/DL (ref 65–140)
GLUCOSE SERPL-MCNC: 46 MG/DL (ref 65–140)
GLUCOSE SERPL-MCNC: 61 MG/DL (ref 70–99)
GLUCOSE SERPL-MCNC: 67 MG/DL (ref 65–140)
GLUCOSE SERPL-MCNC: 67 MG/DL (ref 70–99)
GLUCOSE SERPL-MCNC: 68 MG/DL (ref 65–140)
GLUCOSE SERPL-MCNC: 71 MG/DL (ref 70–99)
GLUCOSE SERPL-MCNC: 82 MG/DL (ref 65–140)
GLUCOSE SERPL-MCNC: 84 MG/DL (ref 65–140)
GLUCOSE SERPL-MCNC: 90 MG/DL (ref 65–140)
GLUCOSE SERPL-MCNC: 91 MG/DL (ref 65–140)
GLUCOSE SERPL-MCNC: 92 MG/DL (ref 65–140)
GLUCOSE SERPL-MCNC: 94 MG/DL (ref 65–140)
GLUCOSE UR STRIP-MCNC: NEGATIVE MG/DL
HCT VFR BLD AUTO: 28 % (ref 34.8–46.1)
HCT VFR BLD AUTO: 28.3 % (ref 34.8–46.1)
HCT VFR BLD AUTO: 32.4 % (ref 34.8–46.1)
HCT VFR BLD AUTO: 35.6 % (ref 34.8–46.1)
HDLC SERPL-MCNC: 52 MG/DL
HGB BLD-MCNC: 11.4 G/DL (ref 11.5–15.4)
HGB BLD-MCNC: 12.6 G/DL (ref 11.5–15.4)
HGB BLD-MCNC: 9.8 G/DL (ref 11.5–15.4)
HGB BLD-MCNC: 9.9 G/DL (ref 11.5–15.4)
HGB UR QL STRIP.AUTO: ABNORMAL
IMM GRANULOCYTES # BLD AUTO: 0.01 THOUSAND/UL (ref 0–0.2)
IMM GRANULOCYTES # BLD AUTO: 0.02 THOUSAND/UL (ref 0–0.2)
IMM GRANULOCYTES NFR BLD AUTO: 0 % (ref 0–2)
IRON SATN MFR SERPL: 34 %
IRON SERPL-MCNC: 57 UG/DL (ref 50–170)
KETONES UR STRIP-MCNC: ABNORMAL MG/DL
LDLC SERPL CALC-MCNC: 97 MG/DL
LEUKOCYTE ESTERASE UR QL STRIP: NEGATIVE
LYMPHOCYTES # BLD AUTO: 0.92 THOUSANDS/ΜL (ref 0.6–4.47)
LYMPHOCYTES # BLD AUTO: 1.19 THOUSANDS/ΜL (ref 0.6–4.47)
LYMPHOCYTES # BLD AUTO: 1.32 THOUSANDS/ΜL (ref 0.6–4.47)
LYMPHOCYTES # BLD AUTO: 1.43 THOUSANDS/ΜL (ref 0.6–4.47)
LYMPHOCYTES NFR BLD AUTO: 24 % (ref 14–44)
LYMPHOCYTES NFR BLD AUTO: 25 % (ref 14–44)
LYMPHOCYTES NFR BLD AUTO: 26 % (ref 14–44)
LYMPHOCYTES NFR BLD AUTO: 29 % (ref 14–44)
MAGNESIUM SERPL-MCNC: 1.8 MG/DL (ref 1.6–2.6)
MAGNESIUM SERPL-MCNC: 2 MG/DL (ref 1.6–2.6)
MAGNESIUM SERPL-MCNC: 2.1 MG/DL (ref 1.6–2.6)
MCH RBC QN AUTO: 27.7 PG (ref 26.8–34.3)
MCH RBC QN AUTO: 27.7 PG (ref 26.8–34.3)
MCH RBC QN AUTO: 27.9 PG (ref 26.8–34.3)
MCH RBC QN AUTO: 28 PG (ref 26.8–34.3)
MCHC RBC AUTO-ENTMCNC: 35 G/DL (ref 31.4–37.4)
MCHC RBC AUTO-ENTMCNC: 35 G/DL (ref 31.4–37.4)
MCHC RBC AUTO-ENTMCNC: 35.2 G/DL (ref 31.4–37.4)
MCHC RBC AUTO-ENTMCNC: 35.4 G/DL (ref 31.4–37.4)
MCV RBC AUTO: 79 FL (ref 82–98)
MCV RBC AUTO: 80 FL (ref 82–98)
METHADONE UR QL: NEGATIVE
MONOCYTES # BLD AUTO: 0.27 THOUSAND/ΜL (ref 0.17–1.22)
MONOCYTES # BLD AUTO: 0.41 THOUSAND/ΜL (ref 0.17–1.22)
MONOCYTES # BLD AUTO: 0.41 THOUSAND/ΜL (ref 0.17–1.22)
MONOCYTES # BLD AUTO: 0.48 THOUSAND/ΜL (ref 0.17–1.22)
MONOCYTES NFR BLD AUTO: 10 % (ref 4–12)
MONOCYTES NFR BLD AUTO: 7 % (ref 4–12)
MONOCYTES NFR BLD AUTO: 8 % (ref 4–12)
MONOCYTES NFR BLD AUTO: 9 % (ref 4–12)
NEUTROPHILS # BLD AUTO: 2.34 THOUSANDS/ΜL (ref 1.85–7.62)
NEUTROPHILS # BLD AUTO: 2.56 THOUSANDS/ΜL (ref 1.85–7.62)
NEUTROPHILS # BLD AUTO: 3.46 THOUSANDS/ΜL (ref 1.85–7.62)
NEUTROPHILS # BLD AUTO: 3.47 THOUSANDS/ΜL (ref 1.85–7.62)
NEUTS SEG NFR BLD AUTO: 58 % (ref 43–75)
NEUTS SEG NFR BLD AUTO: 63 % (ref 43–75)
NEUTS SEG NFR BLD AUTO: 67 % (ref 43–75)
NEUTS SEG NFR BLD AUTO: 67 % (ref 43–75)
NITRITE UR QL STRIP: NEGATIVE
NON-SQ EPI CELLS URNS QL MICRO: NORMAL /HPF
NONHDLC SERPL-MCNC: 106 MG/DL
OPIATES UR QL SCN: NEGATIVE
OSMOLALITY UR/SERPL-RTO: 266 MMOL/KG (ref 282–298)
OSMOLALITY UR: 63 MMOL/KG
OXYCODONE+OXYMORPHONE UR QL SCN: NEGATIVE
P AXIS: 76 DEGREES
PCP UR QL: NEGATIVE
PH UR STRIP.AUTO: 5 [PH]
PHOSPHATE SERPL-MCNC: 1 MG/DL (ref 2.5–5)
PHOSPHATE SERPL-MCNC: 1.7 MG/DL (ref 2.5–5)
PHOSPHATE SERPL-MCNC: 3 MG/DL (ref 2.5–5)
PHOSPHATE SERPL-MCNC: 3.5 MG/DL (ref 2.5–5)
PLATELET # BLD AUTO: 228 THOUSANDS/UL (ref 149–390)
PLATELET # BLD AUTO: 230 THOUSANDS/UL (ref 149–390)
PLATELET # BLD AUTO: 233 THOUSANDS/UL (ref 149–390)
PLATELET # BLD AUTO: 265 THOUSANDS/UL (ref 149–390)
PMV BLD AUTO: 10.4 FL (ref 8.9–12.7)
PMV BLD AUTO: 9.1 FL (ref 8.9–12.7)
PMV BLD AUTO: 9.6 FL (ref 8.9–12.7)
PMV BLD AUTO: 9.7 FL (ref 8.9–12.7)
POTASSIUM SERPL-SCNC: 3.4 MMOL/L (ref 3.5–5)
POTASSIUM SERPL-SCNC: 3.5 MMOL/L (ref 3.5–5)
POTASSIUM SERPL-SCNC: 3.5 MMOL/L (ref 3.5–5)
POTASSIUM SERPL-SCNC: 3.8 MMOL/L (ref 3.5–5)
POTASSIUM SERPL-SCNC: 3.8 MMOL/L (ref 3.6–5)
POTASSIUM SERPL-SCNC: 3.9 MMOL/L (ref 3.6–5)
POTASSIUM SERPL-SCNC: 4 MMOL/L (ref 3.5–5)
POTASSIUM SERPL-SCNC: 4.1 MMOL/L (ref 3.5–5)
POTASSIUM SERPL-SCNC: 4.2 MMOL/L (ref 3.5–5)
POTASSIUM SERPL-SCNC: 4.2 MMOL/L (ref 3.6–5)
POTASSIUM SERPL-SCNC: 4.3 MMOL/L (ref 3.5–5)
POTASSIUM SERPL-SCNC: 4.3 MMOL/L (ref 3.5–5)
POTASSIUM SERPL-SCNC: 4.5 MMOL/L (ref 3.5–5)
POTASSIUM SERPL-SCNC: 4.6 MMOL/L (ref 3.5–5)
PR INTERVAL: 156 MS
PROT SERPL-MCNC: 5.9 G/DL (ref 6.4–8.3)
PROT UR STRIP-MCNC: NEGATIVE MG/DL
QRS AXIS: 59 DEGREES
QRSD INTERVAL: 64 MS
QT INTERVAL: 374 MS
QTC INTERVAL: 431 MS
RBC # BLD AUTO: 3.51 MILLION/UL (ref 3.81–5.12)
RBC # BLD AUTO: 3.57 MILLION/UL (ref 3.81–5.12)
RBC # BLD AUTO: 4.12 MILLION/UL (ref 3.81–5.12)
RBC # BLD AUTO: 4.5 MILLION/UL (ref 3.81–5.12)
RBC #/AREA URNS AUTO: NORMAL /HPF
SARS-COV-2 RNA RESP QL NAA+PROBE: NEGATIVE
SODIUM 24H UR-SCNC: 10 MOL/L
SODIUM SERPL-SCNC: 121 MMOL/L (ref 133–145)
SODIUM SERPL-SCNC: 124 MMOL/L (ref 133–145)
SODIUM SERPL-SCNC: 126 MMOL/L (ref 133–145)
SODIUM SERPL-SCNC: 131 MMOL/L (ref 133–145)
SODIUM SERPL-SCNC: 132 MMOL/L (ref 133–145)
SODIUM SERPL-SCNC: 134 MMOL/L (ref 133–145)
SODIUM SERPL-SCNC: 134 MMOL/L (ref 133–145)
SODIUM SERPL-SCNC: 134 MMOL/L (ref 137–147)
SODIUM SERPL-SCNC: 135 MMOL/L (ref 133–145)
SODIUM SERPL-SCNC: 136 MMOL/L (ref 137–147)
SODIUM SERPL-SCNC: 137 MMOL/L (ref 137–147)
SODIUM SERPL-SCNC: 139 MMOL/L (ref 133–145)
SP GR UR STRIP.AUTO: 1.01 (ref 1–1.03)
T WAVE AXIS: 68 DEGREES
THC UR QL: NEGATIVE
TIBC SERPL-MCNC: 169 UG/DL (ref 250–450)
TRIGL SERPL-MCNC: 46 MG/DL
TSH SERPL DL<=0.05 MIU/L-ACNC: <0.015 UIU/ML (ref 0.47–4.68)
UROBILINOGEN UR QL STRIP.AUTO: 0.2 E.U./DL
VENTRICULAR RATE: 80 BPM
VIT B12 SERPL-MCNC: 619 PG/ML (ref 100–900)
WBC # BLD AUTO: 3.84 THOUSAND/UL (ref 4.31–10.16)
WBC # BLD AUTO: 4.1 THOUSAND/UL (ref 4.31–10.16)
WBC # BLD AUTO: 5.23 THOUSAND/UL (ref 4.31–10.16)
WBC # BLD AUTO: 5.5 THOUSAND/UL (ref 4.31–10.16)
WBC #/AREA URNS AUTO: NORMAL /HPF

## 2020-01-01 PROCEDURE — 99285 EMERGENCY DEPT VISIT HI MDM: CPT

## 2020-01-01 PROCEDURE — 83540 ASSAY OF IRON: CPT | Performed by: INTERNAL MEDICINE

## 2020-01-01 PROCEDURE — 85025 COMPLETE CBC W/AUTO DIFF WBC: CPT | Performed by: INTERNAL MEDICINE

## 2020-01-01 PROCEDURE — 80048 BASIC METABOLIC PNL TOTAL CA: CPT | Performed by: INTERNAL MEDICINE

## 2020-01-01 PROCEDURE — 99232 SBSQ HOSP IP/OBS MODERATE 35: CPT | Performed by: PSYCHIATRY & NEUROLOGY

## 2020-01-01 PROCEDURE — 80053 COMPREHEN METABOLIC PANEL: CPT | Performed by: EMERGENCY MEDICINE

## 2020-01-01 PROCEDURE — 84100 ASSAY OF PHOSPHORUS: CPT | Performed by: INTERNAL MEDICINE

## 2020-01-01 PROCEDURE — 99232 SBSQ HOSP IP/OBS MODERATE 35: CPT | Performed by: INTERNAL MEDICINE

## 2020-01-01 PROCEDURE — 81003 URINALYSIS AUTO W/O SCOPE: CPT | Performed by: EMERGENCY MEDICINE

## 2020-01-01 PROCEDURE — 81001 URINALYSIS AUTO W/SCOPE: CPT | Performed by: EMERGENCY MEDICINE

## 2020-01-01 PROCEDURE — 85025 COMPLETE CBC W/AUTO DIFF WBC: CPT | Performed by: EMERGENCY MEDICINE

## 2020-01-01 PROCEDURE — 83735 ASSAY OF MAGNESIUM: CPT | Performed by: EMERGENCY MEDICINE

## 2020-01-01 PROCEDURE — 93010 ELECTROCARDIOGRAM REPORT: CPT | Performed by: INTERNAL MEDICINE

## 2020-01-01 PROCEDURE — 84300 ASSAY OF URINE SODIUM: CPT | Performed by: INTERNAL MEDICINE

## 2020-01-01 PROCEDURE — 82728 ASSAY OF FERRITIN: CPT | Performed by: INTERNAL MEDICINE

## 2020-01-01 PROCEDURE — 82948 REAGENT STRIP/BLOOD GLUCOSE: CPT

## 2020-01-01 PROCEDURE — 83935 ASSAY OF URINE OSMOLALITY: CPT | Performed by: INTERNAL MEDICINE

## 2020-01-01 PROCEDURE — 82607 VITAMIN B-12: CPT | Performed by: PHYSICIAN ASSISTANT

## 2020-01-01 PROCEDURE — 84443 ASSAY THYROID STIM HORMONE: CPT | Performed by: STUDENT IN AN ORGANIZED HEALTH CARE EDUCATION/TRAINING PROGRAM

## 2020-01-01 PROCEDURE — 99285 EMERGENCY DEPT VISIT HI MDM: CPT | Performed by: EMERGENCY MEDICINE

## 2020-01-01 PROCEDURE — 82746 ASSAY OF FOLIC ACID SERUM: CPT | Performed by: PHYSICIAN ASSISTANT

## 2020-01-01 PROCEDURE — NC001 PR NO CHARGE: Performed by: PSYCHIATRY & NEUROLOGY

## 2020-01-01 PROCEDURE — 93005 ELECTROCARDIOGRAM TRACING: CPT

## 2020-01-01 PROCEDURE — 80048 BASIC METABOLIC PNL TOTAL CA: CPT | Performed by: STUDENT IN AN ORGANIZED HEALTH CARE EDUCATION/TRAINING PROGRAM

## 2020-01-01 PROCEDURE — 99222 1ST HOSP IP/OBS MODERATE 55: CPT | Performed by: INTERNAL MEDICINE

## 2020-01-01 PROCEDURE — 83550 IRON BINDING TEST: CPT | Performed by: INTERNAL MEDICINE

## 2020-01-01 PROCEDURE — 80320 DRUG SCREEN QUANTALCOHOLS: CPT | Performed by: EMERGENCY MEDICINE

## 2020-01-01 PROCEDURE — 99223 1ST HOSP IP/OBS HIGH 75: CPT | Performed by: INTERNAL MEDICINE

## 2020-01-01 PROCEDURE — 82550 ASSAY OF CK (CPK): CPT | Performed by: EMERGENCY MEDICINE

## 2020-01-01 PROCEDURE — 80061 LIPID PANEL: CPT | Performed by: STUDENT IN AN ORGANIZED HEALTH CARE EDUCATION/TRAINING PROGRAM

## 2020-01-01 PROCEDURE — 99239 HOSP IP/OBS DSCHRG MGMT >30: CPT | Performed by: INTERNAL MEDICINE

## 2020-01-01 PROCEDURE — 80307 DRUG TEST PRSMV CHEM ANLYZR: CPT | Performed by: EMERGENCY MEDICINE

## 2020-01-01 PROCEDURE — 83930 ASSAY OF BLOOD OSMOLALITY: CPT | Performed by: INTERNAL MEDICINE

## 2020-01-01 PROCEDURE — 80048 BASIC METABOLIC PNL TOTAL CA: CPT | Performed by: PHYSICIAN ASSISTANT

## 2020-01-01 PROCEDURE — 36415 COLL VENOUS BLD VENIPUNCTURE: CPT | Performed by: EMERGENCY MEDICINE

## 2020-01-01 PROCEDURE — 99221 1ST HOSP IP/OBS SF/LOW 40: CPT | Performed by: PSYCHIATRY & NEUROLOGY

## 2020-01-01 PROCEDURE — 99222 1ST HOSP IP/OBS MODERATE 55: CPT | Performed by: PHYSICIAN ASSISTANT

## 2020-01-01 PROCEDURE — 1124F ACP DISCUSS-NO DSCNMKR DOCD: CPT | Performed by: EMERGENCY MEDICINE

## 2020-01-01 PROCEDURE — 83735 ASSAY OF MAGNESIUM: CPT | Performed by: INTERNAL MEDICINE

## 2020-01-01 PROCEDURE — U0003 INFECTIOUS AGENT DETECTION BY NUCLEIC ACID (DNA OR RNA); SEVERE ACUTE RESPIRATORY SYNDROME CORONAVIRUS 2 (SARS-COV-2) (CORONAVIRUS DISEASE [COVID-19]), AMPLIFIED PROBE TECHNIQUE, MAKING USE OF HIGH THROUGHPUT TECHNOLOGIES AS DESCRIBED BY CMS-2020-01-R: HCPCS | Performed by: INTERNAL MEDICINE

## 2020-01-01 RX ORDER — AMOXICILLIN 250 MG
1 CAPSULE ORAL
Status: DISCONTINUED | OUTPATIENT
Start: 2020-01-01 | End: 2020-01-01 | Stop reason: HOSPADM

## 2020-01-01 RX ORDER — MAGNESIUM HYDROXIDE/ALUMINUM HYDROXICE/SIMETHICONE 120; 1200; 1200 MG/30ML; MG/30ML; MG/30ML
30 SUSPENSION ORAL EVERY 4 HOURS PRN
Status: CANCELLED | OUTPATIENT
Start: 2020-01-01

## 2020-01-01 RX ORDER — LORAZEPAM 0.5 MG/1
0.5 TABLET ORAL EVERY 8 HOURS PRN
Status: DISCONTINUED | OUTPATIENT
Start: 2020-01-01 | End: 2020-01-01 | Stop reason: HOSPADM

## 2020-01-01 RX ORDER — TRAZODONE HYDROCHLORIDE 50 MG/1
50 TABLET ORAL
Status: DISCONTINUED | OUTPATIENT
Start: 2020-01-01 | End: 2020-01-01 | Stop reason: HOSPADM

## 2020-01-01 RX ORDER — MECLIZINE HCL 12.5 MG/1
12.5 TABLET ORAL EVERY 8 HOURS PRN
Status: DISCONTINUED | OUTPATIENT
Start: 2020-01-01 | End: 2020-01-01 | Stop reason: HOSPADM

## 2020-01-01 RX ORDER — LANOLIN ALCOHOL/MO/W.PET/CERES
6 CREAM (GRAM) TOPICAL ONCE
Status: DISCONTINUED | OUTPATIENT
Start: 2020-01-01 | End: 2020-01-01

## 2020-01-01 RX ORDER — SODIUM CHLORIDE 9 MG/ML
75 INJECTION, SOLUTION INTRAVENOUS CONTINUOUS
Status: DISCONTINUED | OUTPATIENT
Start: 2020-01-01 | End: 2020-01-01

## 2020-01-01 RX ORDER — CLONIDINE HYDROCHLORIDE 0.1 MG/1
0.05 TABLET ORAL 2 TIMES DAILY
Status: CANCELLED | OUTPATIENT
Start: 2020-01-01

## 2020-01-01 RX ORDER — ACETAMINOPHEN 325 MG/1
650 TABLET ORAL EVERY 6 HOURS PRN
Status: DISCONTINUED | OUTPATIENT
Start: 2020-01-01 | End: 2020-01-01 | Stop reason: HOSPADM

## 2020-01-01 RX ORDER — LORAZEPAM 1 MG/1
1 TABLET ORAL EVERY 8 HOURS PRN
Status: CANCELLED | OUTPATIENT
Start: 2020-01-01

## 2020-01-01 RX ORDER — TRAZODONE HYDROCHLORIDE 50 MG/1
50 TABLET ORAL
Status: ON HOLD | COMMUNITY
End: 2020-01-01 | Stop reason: SDUPTHER

## 2020-01-01 RX ORDER — CLONIDINE HYDROCHLORIDE 0.1 MG/1
0.05 TABLET ORAL 2 TIMES DAILY
Status: DISCONTINUED | OUTPATIENT
Start: 2020-01-01 | End: 2020-01-01 | Stop reason: HOSPADM

## 2020-01-01 RX ORDER — OLANZAPINE 2.5 MG/1
2.5 TABLET ORAL EVERY 8 HOURS PRN
Status: DISCONTINUED | OUTPATIENT
Start: 2020-01-01 | End: 2020-01-01 | Stop reason: HOSPADM

## 2020-01-01 RX ORDER — OLANZAPINE 5 MG/1
5 TABLET ORAL EVERY 8 HOURS PRN
Status: DISCONTINUED | OUTPATIENT
Start: 2020-01-01 | End: 2020-01-01 | Stop reason: HOSPADM

## 2020-01-01 RX ORDER — HYDROCORTISONE 10 MG/1
5 TABLET ORAL DAILY
Status: DISCONTINUED | OUTPATIENT
Start: 2020-01-01 | End: 2020-01-01

## 2020-01-01 RX ORDER — DOCUSATE SODIUM 100 MG/1
100 CAPSULE, LIQUID FILLED ORAL 2 TIMES DAILY PRN
Status: DISCONTINUED | OUTPATIENT
Start: 2020-01-01 | End: 2020-01-01 | Stop reason: HOSPADM

## 2020-01-01 RX ORDER — NICOTINE 21 MG/24HR
1 PATCH, TRANSDERMAL 24 HOURS TRANSDERMAL DAILY
Status: DISCONTINUED | OUTPATIENT
Start: 2020-01-01 | End: 2020-01-01 | Stop reason: HOSPADM

## 2020-01-01 RX ORDER — SODIUM CHLORIDE, SODIUM GLUCONATE, SODIUM ACETATE, POTASSIUM CHLORIDE, MAGNESIUM CHLORIDE, SODIUM PHOSPHATE, DIBASIC, AND POTASSIUM PHOSPHATE .53; .5; .37; .037; .03; .012; .00082 G/100ML; G/100ML; G/100ML; G/100ML; G/100ML; G/100ML; G/100ML
75 INJECTION, SOLUTION INTRAVENOUS CONTINUOUS
Status: DISCONTINUED | OUTPATIENT
Start: 2020-01-01 | End: 2020-01-01

## 2020-01-01 RX ORDER — CLONIDINE HYDROCHLORIDE 0.1 MG/1
0.1 TABLET ORAL 2 TIMES DAILY
COMMUNITY
Start: 2020-01-01 | End: 2020-01-01 | Stop reason: HOSPADM

## 2020-01-01 RX ORDER — MECLIZINE HCL 12.5 MG/1
12.5 TABLET ORAL 3 TIMES DAILY PRN
COMMUNITY
Start: 2020-01-01 | End: 2020-01-01 | Stop reason: HOSPADM

## 2020-01-01 RX ORDER — TRAZODONE HYDROCHLORIDE 50 MG/1
50 TABLET ORAL
Qty: 30 TABLET | Refills: 0 | Status: SHIPPED | OUTPATIENT
Start: 2020-01-01 | End: 2021-01-01 | Stop reason: HOSPADM

## 2020-01-01 RX ORDER — CLONIDINE HYDROCHLORIDE 0.1 MG/1
0.1 TABLET ORAL 2 TIMES DAILY
Status: DISCONTINUED | OUTPATIENT
Start: 2020-01-01 | End: 2020-01-01 | Stop reason: HOSPADM

## 2020-01-01 RX ORDER — CLONAZEPAM 0.5 MG/1
0.5 TABLET ORAL 2 TIMES DAILY
Status: DISCONTINUED | OUTPATIENT
Start: 2020-01-01 | End: 2020-01-01 | Stop reason: HOSPADM

## 2020-01-01 RX ORDER — HYDROCORTISONE 10 MG/1
5 TABLET ORAL 2 TIMES DAILY
Status: DISCONTINUED | OUTPATIENT
Start: 2020-01-01 | End: 2020-01-01 | Stop reason: HOSPADM

## 2020-01-01 RX ORDER — LEVOTHYROXINE SODIUM 0.05 MG/1
50 TABLET ORAL DAILY
COMMUNITY
Start: 2020-01-01 | End: 2020-01-01 | Stop reason: HOSPADM

## 2020-01-01 RX ORDER — POLYETHYLENE GLYCOL 3350 17 G/17G
17 POWDER, FOR SOLUTION ORAL
COMMUNITY
End: 2020-01-01 | Stop reason: HOSPADM

## 2020-01-01 RX ORDER — OLANZAPINE 2.5 MG/1
5 TABLET ORAL EVERY 8 HOURS PRN
Status: CANCELLED | OUTPATIENT
Start: 2020-01-01

## 2020-01-01 RX ORDER — LEVOTHYROXINE SODIUM 0.03 MG/1
25 TABLET ORAL
Status: DISCONTINUED | OUTPATIENT
Start: 2020-01-01 | End: 2020-01-01 | Stop reason: HOSPADM

## 2020-01-01 RX ORDER — ARIPIPRAZOLE 5 MG/1
5 TABLET ORAL 2 TIMES DAILY
Status: DISCONTINUED | OUTPATIENT
Start: 2020-01-01 | End: 2020-01-01

## 2020-01-01 RX ORDER — ACETAMINOPHEN 325 MG/1
975 TABLET ORAL EVERY 6 HOURS PRN
Status: CANCELLED | OUTPATIENT
Start: 2020-01-01

## 2020-01-01 RX ORDER — TRAZODONE HYDROCHLORIDE 50 MG/1
50 TABLET ORAL
Status: CANCELLED | OUTPATIENT
Start: 2020-01-01

## 2020-01-01 RX ORDER — ARIPIPRAZOLE 5 MG/1
5 TABLET ORAL 2 TIMES DAILY
Status: CANCELLED | OUTPATIENT
Start: 2020-01-01

## 2020-01-01 RX ORDER — DEXTROSE MONOHYDRATE 50 MG/ML
100 INJECTION, SOLUTION INTRAVENOUS CONTINUOUS
Status: DISCONTINUED | OUTPATIENT
Start: 2020-01-01 | End: 2020-01-01

## 2020-01-01 RX ORDER — ARIPIPRAZOLE 5 MG/1
5 TABLET ORAL 2 TIMES DAILY
Status: DISCONTINUED | OUTPATIENT
Start: 2020-01-01 | End: 2020-01-01 | Stop reason: HOSPADM

## 2020-01-01 RX ORDER — HYDROCORTISONE 5 MG/1
5 TABLET ORAL
COMMUNITY
End: 2020-01-01 | Stop reason: HOSPADM

## 2020-01-01 RX ORDER — OLANZAPINE 2.5 MG/1
2.5 TABLET ORAL EVERY 8 HOURS PRN
Status: CANCELLED | OUTPATIENT
Start: 2020-01-01

## 2020-01-01 RX ORDER — DESMOPRESSIN ACETATE 4 UG/ML
1 INJECTION, SOLUTION INTRAVENOUS; SUBCUTANEOUS ONCE
Status: COMPLETED | OUTPATIENT
Start: 2020-01-01 | End: 2020-01-01

## 2020-01-01 RX ORDER — HYDROCORTISONE 5 MG/1
5 TABLET ORAL 2 TIMES DAILY
Qty: 60 TABLET | Refills: 0 | Status: SHIPPED | OUTPATIENT
Start: 2020-01-01 | End: 2021-01-01 | Stop reason: HOSPADM

## 2020-01-01 RX ORDER — CLONAZEPAM 0.5 MG/1
0.5 TABLET ORAL 2 TIMES DAILY
COMMUNITY
End: 2021-01-01 | Stop reason: HOSPADM

## 2020-01-01 RX ORDER — MAGNESIUM HYDROXIDE/ALUMINUM HYDROXICE/SIMETHICONE 120; 1200; 1200 MG/30ML; MG/30ML; MG/30ML
30 SUSPENSION ORAL EVERY 4 HOURS PRN
Status: DISCONTINUED | OUTPATIENT
Start: 2020-01-01 | End: 2020-01-01 | Stop reason: HOSPADM

## 2020-01-01 RX ORDER — FERROUS SULFATE 325(65) MG
325 TABLET ORAL
Qty: 10 TABLET | Refills: 0 | Status: SHIPPED | OUTPATIENT
Start: 2020-01-01 | End: 2020-01-01 | Stop reason: HOSPADM

## 2020-01-01 RX ORDER — LEVOTHYROXINE SODIUM 0.05 MG/1
50 TABLET ORAL
Status: DISCONTINUED | OUTPATIENT
Start: 2020-01-01 | End: 2020-01-01 | Stop reason: HOSPADM

## 2020-01-01 RX ORDER — FERROUS SULFATE 325(65) MG
325 TABLET ORAL
Status: DISCONTINUED | OUTPATIENT
Start: 2020-01-01 | End: 2020-01-01 | Stop reason: HOSPADM

## 2020-01-01 RX ORDER — POLYETHYLENE GLYCOL 3350 17 G/17G
17 POWDER, FOR SOLUTION ORAL DAILY
Status: CANCELLED | OUTPATIENT
Start: 2020-01-01

## 2020-01-01 RX ORDER — LEVOTHYROXINE SODIUM 0.03 MG/1
25 TABLET ORAL
Qty: 30 TABLET | Refills: 0 | Status: SHIPPED | OUTPATIENT
Start: 2020-01-01 | End: 2021-01-01 | Stop reason: HOSPADM

## 2020-01-01 RX ORDER — ARIPIPRAZOLE 5 MG/1
5 TABLET ORAL 2 TIMES DAILY
COMMUNITY
End: 2020-01-01 | Stop reason: HOSPADM

## 2020-01-01 RX ORDER — LORAZEPAM 0.5 MG/1
0.5 TABLET ORAL EVERY 8 HOURS PRN
Status: CANCELLED | OUTPATIENT
Start: 2020-01-01

## 2020-01-01 RX ORDER — AMOXICILLIN 250 MG
1 CAPSULE ORAL
Status: DISCONTINUED | OUTPATIENT
Start: 2020-01-01 | End: 2020-01-01

## 2020-01-01 RX ORDER — FERROUS SULFATE 325(65) MG
325 TABLET ORAL
Qty: 15 TABLET | Refills: 0 | Status: SHIPPED | OUTPATIENT
Start: 2020-01-01 | End: 2021-01-01 | Stop reason: HOSPADM

## 2020-01-01 RX ORDER — LORAZEPAM 1 MG/1
1 TABLET ORAL EVERY 8 HOURS PRN
Status: DISCONTINUED | OUTPATIENT
Start: 2020-01-01 | End: 2020-01-01 | Stop reason: HOSPADM

## 2020-01-01 RX ORDER — ARIPIPRAZOLE 15 MG/1
7.5 TABLET ORAL 2 TIMES DAILY
Status: DISCONTINUED | OUTPATIENT
Start: 2020-01-01 | End: 2020-01-01 | Stop reason: HOSPADM

## 2020-01-01 RX ORDER — POLYETHYLENE GLYCOL 3350 17 G/17G
17 POWDER, FOR SOLUTION ORAL DAILY
Status: DISCONTINUED | OUTPATIENT
Start: 2020-01-01 | End: 2020-01-01 | Stop reason: HOSPADM

## 2020-01-01 RX ORDER — CLONAZEPAM 0.5 MG/1
0.5 TABLET ORAL 2 TIMES DAILY
Status: CANCELLED | OUTPATIENT
Start: 2020-01-01

## 2020-01-01 RX ORDER — SODIUM CHLORIDE 450 MG/100ML
75 INJECTION, SOLUTION INTRAVENOUS CONTINUOUS
Status: DISCONTINUED | OUTPATIENT
Start: 2020-01-01 | End: 2020-01-01

## 2020-01-01 RX ORDER — ACETAMINOPHEN 325 MG/1
650 TABLET ORAL EVERY 6 HOURS PRN
Status: CANCELLED | OUTPATIENT
Start: 2020-01-01

## 2020-01-01 RX ORDER — SODIUM CHLORIDE 1000 MG
1 TABLET, SOLUBLE MISCELLANEOUS ONCE
Status: COMPLETED | OUTPATIENT
Start: 2020-01-01 | End: 2020-01-01

## 2020-01-01 RX ORDER — SODIUM CHLORIDE 1000 MG
1 TABLET, SOLUBLE MISCELLANEOUS
Status: DISCONTINUED | OUTPATIENT
Start: 2020-01-01 | End: 2020-01-01

## 2020-01-01 RX ORDER — DIPHENHYDRAMINE HCL 25 MG
25 TABLET ORAL EVERY 6 HOURS PRN
Status: DISCONTINUED | OUTPATIENT
Start: 2020-01-01 | End: 2020-01-01 | Stop reason: HOSPADM

## 2020-01-01 RX ORDER — NICOTINE 21 MG/24HR
1 PATCH, TRANSDERMAL 24 HOURS TRANSDERMAL DAILY
Qty: 28 PATCH | Refills: 0 | Status: SHIPPED | OUTPATIENT
Start: 2020-01-01 | End: 2020-01-01 | Stop reason: HOSPADM

## 2020-01-01 RX ORDER — DOCUSATE SODIUM 100 MG/1
100 CAPSULE, LIQUID FILLED ORAL 2 TIMES DAILY
Status: DISCONTINUED | OUTPATIENT
Start: 2020-01-01 | End: 2020-01-01

## 2020-01-01 RX ORDER — ACETAMINOPHEN 325 MG/1
975 TABLET ORAL EVERY 6 HOURS PRN
Status: DISCONTINUED | OUTPATIENT
Start: 2020-01-01 | End: 2020-01-01 | Stop reason: HOSPADM

## 2020-01-01 RX ORDER — CLONIDINE HYDROCHLORIDE 0.1 MG/1
0.05 TABLET ORAL 2 TIMES DAILY
Qty: 30 TABLET | Refills: 0 | Status: SHIPPED | OUTPATIENT
Start: 2020-01-01 | End: 2021-01-01 | Stop reason: HOSPADM

## 2020-01-01 RX ORDER — SENNOSIDES 8.6 MG
1 TABLET ORAL
Status: DISCONTINUED | OUTPATIENT
Start: 2020-01-01 | End: 2020-01-01

## 2020-01-01 RX ORDER — HYDROCORTISONE 10 MG/1
5 TABLET ORAL 2 TIMES DAILY
Status: CANCELLED | OUTPATIENT
Start: 2020-01-01

## 2020-01-01 RX ORDER — POTASSIUM CHLORIDE 20 MEQ/1
40 TABLET, EXTENDED RELEASE ORAL ONCE
Status: COMPLETED | OUTPATIENT
Start: 2020-01-01 | End: 2020-01-01

## 2020-01-01 RX ORDER — FERROUS SULFATE 325(65) MG
325 TABLET ORAL
Status: CANCELLED | OUTPATIENT
Start: 2020-01-01

## 2020-01-01 RX ORDER — LEVOTHYROXINE SODIUM 0.05 MG/1
50 TABLET ORAL
Status: DISCONTINUED | OUTPATIENT
Start: 2020-01-01 | End: 2020-01-01

## 2020-01-01 RX ORDER — ARIPIPRAZOLE 15 MG/1
7.5 TABLET ORAL 2 TIMES DAILY
Qty: 30 TABLET | Refills: 0 | Status: SHIPPED | OUTPATIENT
Start: 2020-01-01 | End: 2021-01-01 | Stop reason: HOSPADM

## 2020-01-01 RX ORDER — LEVOTHYROXINE SODIUM 0.05 MG/1
50 TABLET ORAL
Status: CANCELLED | OUTPATIENT
Start: 2020-01-01

## 2020-01-01 RX ADMIN — SENNOSIDES AND DOCUSATE SODIUM 1 TABLET: 8.6; 5 TABLET ORAL at 10:55

## 2020-01-01 RX ADMIN — LEVOTHYROXINE SODIUM 25 MCG: 25 TABLET ORAL at 05:13

## 2020-01-01 RX ADMIN — DOCUSATE SODIUM 100 MG: 100 CAPSULE, LIQUID FILLED ORAL at 18:56

## 2020-01-01 RX ADMIN — ARIPIPRAZOLE 5 MG: 5 TABLET ORAL at 21:20

## 2020-01-01 RX ADMIN — TRAZODONE HYDROCHLORIDE 50 MG: 50 TABLET ORAL at 21:21

## 2020-01-01 RX ADMIN — ENOXAPARIN SODIUM 40 MG: 100 INJECTION SUBCUTANEOUS at 09:54

## 2020-01-01 RX ADMIN — ARIPIPRAZOLE 5 MG: 5 TABLET ORAL at 09:50

## 2020-01-01 RX ADMIN — POTASSIUM & SODIUM PHOSPHATES POWDER PACK 280-160-250 MG 1 PACKET: 280-160-250 PACK at 18:53

## 2020-01-01 RX ADMIN — CLONAZEPAM 0.5 MG: 0.5 TABLET ORAL at 09:04

## 2020-01-01 RX ADMIN — CLONAZEPAM 0.5 MG: 0.5 TABLET ORAL at 17:26

## 2020-01-01 RX ADMIN — LEVOTHYROXINE SODIUM 50 MCG: 50 TABLET ORAL at 06:41

## 2020-01-01 RX ADMIN — HYDROCORTISONE 5 MG: 10 TABLET ORAL at 17:52

## 2020-01-01 RX ADMIN — LEVOTHYROXINE SODIUM 50 MCG: 50 TABLET ORAL at 06:16

## 2020-01-01 RX ADMIN — Medication 1 G: at 13:28

## 2020-01-01 RX ADMIN — HYDROCORTISONE 5 MG: 10 TABLET ORAL at 09:03

## 2020-01-01 RX ADMIN — HYDROCORTISONE 5 MG: 10 TABLET ORAL at 09:10

## 2020-01-01 RX ADMIN — HYDROCORTISONE 5 MG: 10 TABLET ORAL at 17:05

## 2020-01-01 RX ADMIN — HYDROCORTISONE 5 MG: 10 TABLET ORAL at 09:04

## 2020-01-01 RX ADMIN — POLYETHYLENE GLYCOL 3350 17 G: 17 POWDER, FOR SOLUTION ORAL at 09:11

## 2020-01-01 RX ADMIN — POTASSIUM & SODIUM PHOSPHATES POWDER PACK 280-160-250 MG 1 PACKET: 280-160-250 PACK at 08:46

## 2020-01-01 RX ADMIN — POTASSIUM & SODIUM PHOSPHATES POWDER PACK 280-160-250 MG 1 PACKET: 280-160-250 PACK at 07:52

## 2020-01-01 RX ADMIN — TRAZODONE HYDROCHLORIDE 50 MG: 50 TABLET ORAL at 21:13

## 2020-01-01 RX ADMIN — CLONAZEPAM 0.5 MG: 0.5 TABLET ORAL at 19:12

## 2020-01-01 RX ADMIN — CLONAZEPAM 0.5 MG: 0.5 TABLET ORAL at 09:11

## 2020-01-01 RX ADMIN — CLONIDINE HYDROCHLORIDE 0.05 MG: 0.1 TABLET ORAL at 17:37

## 2020-01-01 RX ADMIN — CLONAZEPAM 0.5 MG: 0.5 TABLET ORAL at 09:03

## 2020-01-01 RX ADMIN — HYDROCORTISONE 5 MG: 10 TABLET ORAL at 17:01

## 2020-01-01 RX ADMIN — ARIPIPRAZOLE 5 MG: 5 TABLET ORAL at 09:11

## 2020-01-01 RX ADMIN — SODIUM CHLORIDE 250 ML: 0.9 INJECTION, SOLUTION INTRAVENOUS at 16:38

## 2020-01-01 RX ADMIN — LEVOTHYROXINE SODIUM 50 MCG: 50 TABLET ORAL at 06:27

## 2020-01-01 RX ADMIN — CLONAZEPAM 0.5 MG: 0.5 TABLET ORAL at 17:52

## 2020-01-01 RX ADMIN — ARIPIPRAZOLE 7.5 MG: 15 TABLET ORAL at 08:20

## 2020-01-01 RX ADMIN — ARIPIPRAZOLE 5 MG: 5 TABLET ORAL at 08:46

## 2020-01-01 RX ADMIN — DIPHENHYDRAMINE HCL 25 MG: 25 TABLET, COATED ORAL at 00:59

## 2020-01-01 RX ADMIN — CLONIDINE HYDROCHLORIDE 0.05 MG: 0.1 TABLET ORAL at 08:59

## 2020-01-01 RX ADMIN — LEVOTHYROXINE SODIUM 50 MCG: 50 TABLET ORAL at 06:33

## 2020-01-01 RX ADMIN — LEVOTHYROXINE SODIUM 25 MCG: 25 TABLET ORAL at 06:28

## 2020-01-01 RX ADMIN — DEXTROSE 125 ML/HR: 5 SOLUTION INTRAVENOUS at 08:45

## 2020-01-01 RX ADMIN — CLONIDINE HYDROCHLORIDE 0.1 MG: 0.1 TABLET ORAL at 17:13

## 2020-01-01 RX ADMIN — TRAZODONE HYDROCHLORIDE 50 MG: 50 TABLET ORAL at 22:22

## 2020-01-01 RX ADMIN — CLONAZEPAM 0.5 MG: 0.5 TABLET ORAL at 17:21

## 2020-01-01 RX ADMIN — ENOXAPARIN SODIUM 40 MG: 100 INJECTION SUBCUTANEOUS at 09:42

## 2020-01-01 RX ADMIN — CLONIDINE HYDROCHLORIDE 0.05 MG: 0.1 TABLET ORAL at 19:12

## 2020-01-01 RX ADMIN — CLONIDINE HYDROCHLORIDE 0.1 MG: 0.1 TABLET ORAL at 13:16

## 2020-01-01 RX ADMIN — ARIPIPRAZOLE 5 MG: 5 TABLET ORAL at 22:10

## 2020-01-01 RX ADMIN — HYDROCORTISONE 5 MG: 10 TABLET ORAL at 17:26

## 2020-01-01 RX ADMIN — HYDROCORTISONE 5 MG: 10 TABLET ORAL at 17:14

## 2020-01-01 RX ADMIN — ARIPIPRAZOLE 5 MG: 5 TABLET ORAL at 22:15

## 2020-01-01 RX ADMIN — CLONIDINE HYDROCHLORIDE 0.05 MG: 0.1 TABLET ORAL at 09:05

## 2020-01-01 RX ADMIN — POLYETHYLENE GLYCOL 3350 17 G: 17 POWDER, FOR SOLUTION ORAL at 09:42

## 2020-01-01 RX ADMIN — DOCUSATE SODIUM 100 MG: 100 CAPSULE, LIQUID FILLED ORAL at 15:08

## 2020-01-01 RX ADMIN — ARIPIPRAZOLE 5 MG: 5 TABLET ORAL at 21:35

## 2020-01-01 RX ADMIN — HYDROCORTISONE 5 MG: 10 TABLET ORAL at 09:43

## 2020-01-01 RX ADMIN — CLONAZEPAM 0.5 MG: 0.5 TABLET ORAL at 08:46

## 2020-01-01 RX ADMIN — SODIUM CHLORIDE 75 ML/HR: 0.9 INJECTION, SOLUTION INTRAVENOUS at 17:56

## 2020-01-01 RX ADMIN — CLONIDINE HYDROCHLORIDE 0.05 MG: 0.1 TABLET ORAL at 17:52

## 2020-01-01 RX ADMIN — ARIPIPRAZOLE 7.5 MG: 15 TABLET ORAL at 21:13

## 2020-01-01 RX ADMIN — DOCUSATE SODIUM 100 MG: 100 CAPSULE, LIQUID FILLED ORAL at 15:23

## 2020-01-01 RX ADMIN — HYDROCORTISONE 5 MG: 10 TABLET ORAL at 09:05

## 2020-01-01 RX ADMIN — CLONAZEPAM 0.5 MG: 0.5 TABLET ORAL at 17:37

## 2020-01-01 RX ADMIN — LEVOTHYROXINE SODIUM 25 MCG: 25 TABLET ORAL at 05:23

## 2020-01-01 RX ADMIN — POTASSIUM CHLORIDE 40 MEQ: 1500 TABLET, EXTENDED RELEASE ORAL at 13:15

## 2020-01-01 RX ADMIN — POLYETHYLENE GLYCOL 3350 17 G: 17 POWDER, FOR SOLUTION ORAL at 08:22

## 2020-01-01 RX ADMIN — SENNOSIDES AND DOCUSATE SODIUM 1 TABLET: 8.6; 5 TABLET ORAL at 22:15

## 2020-01-01 RX ADMIN — ARIPIPRAZOLE 5 MG: 5 TABLET ORAL at 21:21

## 2020-01-01 RX ADMIN — HYDROCORTISONE 5 MG: 10 TABLET ORAL at 19:13

## 2020-01-01 RX ADMIN — TRAZODONE HYDROCHLORIDE 50 MG: 50 TABLET ORAL at 21:35

## 2020-01-01 RX ADMIN — POLYETHYLENE GLYCOL 3350 17 G: 17 POWDER, FOR SOLUTION ORAL at 09:01

## 2020-01-01 RX ADMIN — HYDROCORTISONE 5 MG: 10 TABLET ORAL at 17:21

## 2020-01-01 RX ADMIN — POLYETHYLENE GLYCOL 3350 17 G: 17 POWDER, FOR SOLUTION ORAL at 08:57

## 2020-01-01 RX ADMIN — HYDROCORTISONE 5 MG: 10 TABLET ORAL at 08:21

## 2020-01-01 RX ADMIN — HYDROCORTISONE 5 MG: 10 TABLET ORAL at 17:27

## 2020-01-01 RX ADMIN — ARIPIPRAZOLE 5 MG: 5 TABLET ORAL at 09:43

## 2020-01-01 RX ADMIN — CLONAZEPAM 0.5 MG: 0.5 TABLET ORAL at 09:50

## 2020-01-01 RX ADMIN — CLONAZEPAM 0.5 MG: 0.5 TABLET ORAL at 09:43

## 2020-01-01 RX ADMIN — HYDROCORTISONE 5 MG: 10 TABLET ORAL at 08:46

## 2020-01-01 RX ADMIN — HYDROCORTISONE 5 MG: 10 TABLET ORAL at 09:12

## 2020-01-01 RX ADMIN — POLYETHYLENE GLYCOL 3350 17 G: 17 POWDER, FOR SOLUTION ORAL at 08:23

## 2020-01-01 RX ADMIN — CLONIDINE HYDROCHLORIDE 0.05 MG: 0.1 TABLET ORAL at 08:22

## 2020-01-01 RX ADMIN — CLONAZEPAM 0.5 MG: 0.5 TABLET ORAL at 08:59

## 2020-01-01 RX ADMIN — ENOXAPARIN SODIUM 40 MG: 100 INJECTION SUBCUTANEOUS at 09:51

## 2020-01-01 RX ADMIN — LEVOTHYROXINE SODIUM 25 MCG: 25 TABLET ORAL at 09:40

## 2020-01-01 RX ADMIN — POLYETHYLENE GLYCOL 3350 17 G: 17 POWDER, FOR SOLUTION ORAL at 08:45

## 2020-01-01 RX ADMIN — CLONAZEPAM 0.5 MG: 0.5 TABLET ORAL at 17:28

## 2020-01-01 RX ADMIN — HYDROCORTISONE 5 MG: 10 TABLET ORAL at 17:41

## 2020-01-01 RX ADMIN — SODIUM CHLORIDE TAB 1 GM 1 G: 1 TAB at 10:30

## 2020-01-01 RX ADMIN — LEVOTHYROXINE SODIUM 50 MCG: 50 TABLET ORAL at 06:29

## 2020-01-01 RX ADMIN — CLONAZEPAM 0.5 MG: 0.5 TABLET ORAL at 17:55

## 2020-01-01 RX ADMIN — CLONIDINE HYDROCHLORIDE 0.1 MG: 0.1 TABLET ORAL at 17:02

## 2020-01-01 RX ADMIN — HYDROCORTISONE 5 MG: 10 TABLET ORAL at 08:57

## 2020-01-01 RX ADMIN — CLONAZEPAM 0.5 MG: 0.5 TABLET ORAL at 17:14

## 2020-01-01 RX ADMIN — LEVOTHYROXINE SODIUM 25 MCG: 25 TABLET ORAL at 06:35

## 2020-01-01 RX ADMIN — CLONAZEPAM 0.5 MG: 0.5 TABLET ORAL at 17:00

## 2020-01-01 RX ADMIN — POLYETHYLENE GLYCOL 3350 17 G: 17 POWDER, FOR SOLUTION ORAL at 09:10

## 2020-01-01 RX ADMIN — HYDROCORTISONE 5 MG: 10 TABLET ORAL at 23:08

## 2020-01-01 RX ADMIN — ARIPIPRAZOLE 5 MG: 5 TABLET ORAL at 23:08

## 2020-01-01 RX ADMIN — CLONAZEPAM 0.5 MG: 0.5 TABLET ORAL at 08:21

## 2020-01-01 RX ADMIN — TRAZODONE HYDROCHLORIDE 50 MG: 50 TABLET ORAL at 22:10

## 2020-01-01 RX ADMIN — CLONAZEPAM 0.5 MG: 0.5 TABLET ORAL at 17:15

## 2020-01-01 RX ADMIN — CLONIDINE HYDROCHLORIDE 0.1 MG: 0.1 TABLET ORAL at 17:55

## 2020-01-01 RX ADMIN — ARIPIPRAZOLE 5 MG: 5 TABLET ORAL at 21:27

## 2020-01-01 RX ADMIN — TRAZODONE HYDROCHLORIDE 50 MG: 50 TABLET ORAL at 21:16

## 2020-01-01 RX ADMIN — POLYETHYLENE GLYCOL 3350 17 G: 17 POWDER, FOR SOLUTION ORAL at 10:38

## 2020-01-01 RX ADMIN — ARIPIPRAZOLE 7.5 MG: 15 TABLET ORAL at 09:03

## 2020-01-01 RX ADMIN — DESMOPRESSIN ACETATE 1 MCG: 4 INJECTION INTRAVENOUS at 17:04

## 2020-01-01 RX ADMIN — ARIPIPRAZOLE 7.5 MG: 15 TABLET ORAL at 08:59

## 2020-01-01 RX ADMIN — BISACODYL 5 MG: 5 TABLET, COATED ORAL at 10:37

## 2020-01-01 RX ADMIN — HYDROCORTISONE 5 MG: 10 TABLET ORAL at 08:19

## 2020-01-01 RX ADMIN — SODIUM CHLORIDE 75 ML/HR: 0.45 INJECTION, SOLUTION INTRAVENOUS at 23:12

## 2020-01-01 RX ADMIN — ARIPIPRAZOLE 5 MG: 5 TABLET ORAL at 08:53

## 2020-01-01 RX ADMIN — CLONAZEPAM 0.5 MG: 0.5 TABLET ORAL at 17:05

## 2020-01-01 RX ADMIN — TRAZODONE HYDROCHLORIDE 50 MG: 50 TABLET ORAL at 23:08

## 2020-01-01 RX ADMIN — CLONAZEPAM 0.5 MG: 0.5 TABLET ORAL at 08:57

## 2020-01-01 RX ADMIN — POTASSIUM & SODIUM PHOSPHATES POWDER PACK 280-160-250 MG 1 PACKET: 280-160-250 PACK at 16:22

## 2020-01-01 RX ADMIN — TRAZODONE HYDROCHLORIDE 50 MG: 50 TABLET ORAL at 21:07

## 2020-01-01 RX ADMIN — FERROUS SULFATE TAB 325 MG (65 MG ELEMENTAL FE) 325 MG: 325 (65 FE) TAB at 08:46

## 2020-01-01 RX ADMIN — POLYETHYLENE GLYCOL 3350 17 G: 17 POWDER, FOR SOLUTION ORAL at 09:14

## 2020-01-01 RX ADMIN — CLONIDINE HYDROCHLORIDE 0.1 MG: 0.1 TABLET ORAL at 09:50

## 2020-01-01 RX ADMIN — CLONAZEPAM 0.5 MG: 0.5 TABLET ORAL at 08:47

## 2020-01-01 RX ADMIN — HYDROCORTISONE 5 MG: 10 TABLET ORAL at 09:50

## 2020-01-01 RX ADMIN — LEVOTHYROXINE SODIUM 50 MCG: 50 TABLET ORAL at 06:19

## 2020-01-01 RX ADMIN — ARIPIPRAZOLE 5 MG: 5 TABLET ORAL at 09:05

## 2020-01-01 RX ADMIN — TRAZODONE HYDROCHLORIDE 50 MG: 50 TABLET ORAL at 22:15

## 2020-01-01 RX ADMIN — LEVOTHYROXINE SODIUM 25 MCG: 25 TABLET ORAL at 06:19

## 2020-01-01 RX ADMIN — ENOXAPARIN SODIUM 40 MG: 100 INJECTION SUBCUTANEOUS at 08:53

## 2020-01-01 RX ADMIN — ARIPIPRAZOLE 7.5 MG: 15 TABLET ORAL at 21:15

## 2020-01-01 RX ADMIN — ARIPIPRAZOLE 5 MG: 5 TABLET ORAL at 22:22

## 2020-01-01 RX ADMIN — CLONIDINE HYDROCHLORIDE 0.05 MG: 0.1 TABLET ORAL at 09:04

## 2020-01-01 RX ADMIN — TRAZODONE HYDROCHLORIDE 50 MG: 50 TABLET ORAL at 21:27

## 2020-01-01 RX ADMIN — POLYETHYLENE GLYCOL 3350 17 G: 17 POWDER, FOR SOLUTION ORAL at 09:06

## 2020-01-01 RX ADMIN — CLONAZEPAM 0.5 MG: 0.5 TABLET ORAL at 17:02

## 2020-01-01 RX ADMIN — ARIPIPRAZOLE 5 MG: 5 TABLET ORAL at 08:21

## 2020-01-01 RX ADMIN — ARIPIPRAZOLE 7.5 MG: 15 TABLET ORAL at 09:10

## 2020-01-01 RX ADMIN — CLONIDINE HYDROCHLORIDE 0.05 MG: 0.1 TABLET ORAL at 09:10

## 2020-01-01 RX ADMIN — ARIPIPRAZOLE 7.5 MG: 15 TABLET ORAL at 21:06

## 2020-09-23 PROBLEM — R63.0 ANOREXIA: Status: ACTIVE | Noted: 2020-01-01

## 2020-09-23 PROBLEM — R62.7 FTT (FAILURE TO THRIVE) IN ADULT: Status: ACTIVE | Noted: 2020-01-01

## 2020-09-23 PROBLEM — T14.91XA SUICIDE ATTEMPT (HCC): Status: ACTIVE | Noted: 2020-01-01

## 2020-09-23 PROBLEM — E87.1 HYPONATREMIA: Status: ACTIVE | Noted: 2020-01-01

## 2020-09-23 PROBLEM — F32.A DEPRESSION: Status: ACTIVE | Noted: 2020-01-01

## 2020-09-25 PROBLEM — K59.00 CONSTIPATION: Status: ACTIVE | Noted: 2020-01-01

## 2020-09-25 PROBLEM — E43 SEVERE PROTEIN-CALORIE MALNUTRITION (HCC): Status: ACTIVE | Noted: 2020-01-01

## 2020-09-28 PROBLEM — E03.9 HYPOTHYROID: Status: ACTIVE | Noted: 2020-01-01

## 2020-09-28 PROBLEM — E89.3: Status: ACTIVE | Noted: 2020-01-01

## 2020-09-28 PROBLEM — F50.00: Status: ACTIVE | Noted: 2020-01-01

## 2020-09-29 PROBLEM — F33.2 SEVERE EPISODE OF RECURRENT MAJOR DEPRESSIVE DISORDER, WITHOUT PSYCHOTIC FEATURES (HCC): Status: ACTIVE | Noted: 2020-01-01

## 2020-09-29 PROBLEM — Z00.8 MEDICAL CLEARANCE FOR PSYCHIATRIC ADMISSION: Status: ACTIVE | Noted: 2020-01-01

## 2020-09-29 PROBLEM — D64.9 ANEMIA: Status: ACTIVE | Noted: 2020-01-01

## 2020-09-29 PROBLEM — F41.0 PANIC DISORDER WITHOUT AGORAPHOBIA: Status: ACTIVE | Noted: 2020-01-01

## 2020-09-29 PROBLEM — E07.9 THYROID DISORDER: Status: ACTIVE | Noted: 2020-01-01

## 2020-09-29 PROBLEM — F43.10 PTSD (POST-TRAUMATIC STRESS DISORDER): Status: ACTIVE | Noted: 2020-01-01

## 2020-09-29 PROBLEM — I10 HYPERTENSION: Status: ACTIVE | Noted: 2020-01-01

## 2020-09-29 PROBLEM — F10.21 ALCOHOL USE DISORDER, MODERATE, IN SUSTAINED REMISSION (HCC): Status: ACTIVE | Noted: 2020-01-01

## 2021-01-01 ENCOUNTER — APPOINTMENT (INPATIENT)
Dept: NON INVASIVE DIAGNOSTICS | Facility: HOSPITAL | Age: 66
DRG: 640 | End: 2021-01-01
Payer: MEDICARE

## 2021-01-01 ENCOUNTER — TELEPHONE (OUTPATIENT)
Dept: RADIOLOGY | Facility: HOSPITAL | Age: 66
End: 2021-01-01

## 2021-01-01 ENCOUNTER — APPOINTMENT (INPATIENT)
Dept: RADIOLOGY | Facility: HOSPITAL | Age: 66
DRG: 314 | End: 2021-01-01
Payer: MEDICARE

## 2021-01-01 ENCOUNTER — APPOINTMENT (EMERGENCY)
Dept: CT IMAGING | Facility: HOSPITAL | Age: 66
DRG: 640 | End: 2021-01-01
Payer: MEDICARE

## 2021-01-01 ENCOUNTER — APPOINTMENT (EMERGENCY)
Dept: RADIOLOGY | Facility: HOSPITAL | Age: 66
DRG: 640 | End: 2021-01-01
Payer: MEDICARE

## 2021-01-01 ENCOUNTER — APPOINTMENT (INPATIENT)
Dept: NON INVASIVE DIAGNOSTICS | Facility: HOSPITAL | Age: 66
DRG: 314 | End: 2021-01-01
Payer: MEDICARE

## 2021-01-01 ENCOUNTER — HOSPITAL ENCOUNTER (INPATIENT)
Facility: HOSPITAL | Age: 66
LOS: 14 days | DRG: 314 | End: 2021-02-18
Attending: INTERNAL MEDICINE | Admitting: INTERNAL MEDICINE
Payer: MEDICARE

## 2021-01-01 ENCOUNTER — HOSPITAL ENCOUNTER (INPATIENT)
Facility: HOSPITAL | Age: 66
LOS: 3 days | DRG: 640 | End: 2021-02-04
Attending: EMERGENCY MEDICINE | Admitting: INTERNAL MEDICINE
Payer: MEDICARE

## 2021-01-01 VITALS
OXYGEN SATURATION: 98 % | TEMPERATURE: 97.1 F | WEIGHT: 70 LBS | DIASTOLIC BLOOD PRESSURE: 78 MMHG | SYSTOLIC BLOOD PRESSURE: 97 MMHG | HEART RATE: 116 BPM | BODY MASS INDEX: 14.14 KG/M2 | RESPIRATION RATE: 16 BRPM

## 2021-01-01 VITALS
HEIGHT: 58 IN | DIASTOLIC BLOOD PRESSURE: 67 MMHG | HEART RATE: 110 BPM | BODY MASS INDEX: 14.21 KG/M2 | OXYGEN SATURATION: 95 % | SYSTOLIC BLOOD PRESSURE: 113 MMHG | WEIGHT: 67.68 LBS | TEMPERATURE: 98.4 F | RESPIRATION RATE: 25 BRPM

## 2021-01-01 DIAGNOSIS — E07.9 THYROID DISORDER: ICD-10-CM

## 2021-01-01 DIAGNOSIS — S16.1XXA STRAIN OF NECK MUSCLE, INITIAL ENCOUNTER: ICD-10-CM

## 2021-01-01 DIAGNOSIS — R62.7 FTT (FAILURE TO THRIVE) IN ADULT: Primary | ICD-10-CM

## 2021-01-01 DIAGNOSIS — T14.91XA SUICIDE ATTEMPT (HCC): ICD-10-CM
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
## 2021-01-01 DIAGNOSIS — J96.01 ACUTE RESPIRATORY FAILURE WITH HYPOXIA (HCC): ICD-10-CM

## 2021-01-01 DIAGNOSIS — S39.012A STRAIN OF LUMBAR REGION, INITIAL ENCOUNTER: ICD-10-CM

## 2021-01-01 DIAGNOSIS — R53.1 GENERALIZED WEAKNESS: Primary | ICD-10-CM

## 2021-01-01 DIAGNOSIS — E87.1 HYPONATREMIA: ICD-10-CM

## 2021-01-01 DIAGNOSIS — F50.00 ANOREXIA NERVOSA WITH SIGNIFICANTLY LOW BODY WEIGHT: ICD-10-CM

## 2021-01-01 DIAGNOSIS — U07.1 COVID-19 VIRUS INFECTION: ICD-10-CM

## 2021-01-01 DIAGNOSIS — E89.3 HYPOPITUITARISM AFTER PROCEDURE (HCC): ICD-10-CM

## 2021-01-01 DIAGNOSIS — R77.8 ELEVATED TROPONIN: ICD-10-CM

## 2021-01-01 DIAGNOSIS — E43 SEVERE PROTEIN-CALORIE MALNUTRITION (HCC): ICD-10-CM

## 2021-01-01 LAB
25(OH)D3 SERPL-MCNC: 80.9 NG/ML (ref 30–100)
ALBUMIN SERPL BCP-MCNC: 2.1 G/DL (ref 3.5–5)
ALBUMIN SERPL BCP-MCNC: 2.1 G/DL (ref 3.5–5)
ALBUMIN SERPL BCP-MCNC: 2.3 G/DL (ref 3.5–5)
ALBUMIN SERPL BCP-MCNC: 2.5 G/DL (ref 3.5–5)
ALBUMIN SERPL BCP-MCNC: 2.9 G/DL (ref 3.5–5)
ALBUMIN SERPL BCP-MCNC: 4.6 G/DL (ref 3.4–4.8)
ALP SERPL-CCNC: 35.5 U/L (ref 35–140)
ALP SERPL-CCNC: 41 U/L (ref 46–116)
ALP SERPL-CCNC: 42 U/L (ref 46–116)
ALP SERPL-CCNC: 44 U/L (ref 46–116)
ALP SERPL-CCNC: 46 U/L (ref 46–116)
ALP SERPL-CCNC: 51 U/L (ref 46–116)
ALP SERPL-CCNC: 53 U/L (ref 46–116)
ALP SERPL-CCNC: 55 U/L (ref 46–116)
ALP SERPL-CCNC: 61 U/L (ref 46–116)
ALT SERPL W P-5'-P-CCNC: 17 U/L (ref 12–78)
ALT SERPL W P-5'-P-CCNC: 19 U/L (ref 12–78)
ALT SERPL W P-5'-P-CCNC: 21 U/L (ref 12–78)
ALT SERPL W P-5'-P-CCNC: 22 U/L (ref 12–78)
ALT SERPL W P-5'-P-CCNC: 22 U/L (ref 12–78)
ALT SERPL W P-5'-P-CCNC: 25 U/L (ref 12–78)
ALT SERPL W P-5'-P-CCNC: 9 U/L (ref 5–54)
AMORPH PHOS CRY URNS QL MICRO: ABNORMAL /HPF
ANION GAP SERPL CALCULATED.3IONS-SCNC: 10 MMOL/L (ref 4–13)
ANION GAP SERPL CALCULATED.3IONS-SCNC: 4 MMOL/L (ref 4–13)
ANION GAP SERPL CALCULATED.3IONS-SCNC: 4 MMOL/L (ref 4–13)
ANION GAP SERPL CALCULATED.3IONS-SCNC: 5 MMOL/L (ref 4–13)
ANION GAP SERPL CALCULATED.3IONS-SCNC: 6 MMOL/L (ref 4–13)
ANION GAP SERPL CALCULATED.3IONS-SCNC: 7 MMOL/L (ref 4–13)
ANION GAP SERPL CALCULATED.3IONS-SCNC: 8 MMOL/L (ref 4–13)
ANION GAP SERPL CALCULATED.3IONS-SCNC: 8 MMOL/L (ref 4–13)
ANION GAP SERPL CALCULATED.3IONS-SCNC: 9 MMOL/L (ref 4–13)
AST SERPL W P-5'-P-CCNC: 19 U/L (ref 5–45)
AST SERPL W P-5'-P-CCNC: 28 U/L (ref 15–41)
AST SERPL W P-5'-P-CCNC: 31 U/L (ref 5–45)
AST SERPL W P-5'-P-CCNC: 32 U/L (ref 5–45)
AST SERPL W P-5'-P-CCNC: 35 U/L (ref 5–45)
AST SERPL W P-5'-P-CCNC: 37 U/L (ref 5–45)
AST SERPL W P-5'-P-CCNC: 40 U/L (ref 5–45)
ATRIAL RATE: 109 BPM
ATRIAL RATE: 123 BPM
BACTERIA BLD CULT: NORMAL
BACTERIA BLD CULT: NORMAL
BACTERIA UR QL AUTO: ABNORMAL /HPF
BACTERIA UR QL AUTO: ABNORMAL /HPF
BASOPHILS # BLD AUTO: 0.01 THOUSANDS/ΜL (ref 0–0.1)
BASOPHILS # BLD AUTO: 0.02 THOUSANDS/ΜL (ref 0–0.1)
BASOPHILS # BLD MANUAL: 0 THOUSAND/UL (ref 0–0.1)
BASOPHILS NFR BLD AUTO: 0 % (ref 0–1)
BASOPHILS NFR MAR MANUAL: 0 % (ref 0–1)
BILIRUB SERPL-MCNC: 0.2 MG/DL (ref 0.2–1)
BILIRUB SERPL-MCNC: 0.3 MG/DL (ref 0.2–1)
BILIRUB SERPL-MCNC: 0.3 MG/DL (ref 0.2–1)
BILIRUB SERPL-MCNC: 0.4 MG/DL (ref 0.2–1)
BILIRUB SERPL-MCNC: 0.4 MG/DL (ref 0.2–1)
BILIRUB SERPL-MCNC: 0.6 MG/DL (ref 0.2–1)
BILIRUB SERPL-MCNC: 0.78 MG/DL (ref 0.3–1.2)
BILIRUB SERPL-MCNC: 0.9 MG/DL (ref 0.2–1)
BILIRUB SERPL-MCNC: 1.4 MG/DL (ref 0.2–1)
BILIRUB UR QL STRIP: NEGATIVE
BILIRUB UR QL STRIP: NEGATIVE
BUN SERPL-MCNC: 10 MG/DL (ref 5–25)
BUN SERPL-MCNC: 11 MG/DL (ref 5–25)
BUN SERPL-MCNC: 12 MG/DL (ref 5–25)
BUN SERPL-MCNC: 13 MG/DL (ref 5–25)
BUN SERPL-MCNC: 13 MG/DL (ref 5–25)
BUN SERPL-MCNC: 14 MG/DL (ref 5–25)
BUN SERPL-MCNC: 3 MG/DL (ref 6–20)
BUN SERPL-MCNC: 30 MG/DL (ref 6–20)
BUN SERPL-MCNC: 4 MG/DL (ref 6–20)
BUN SERPL-MCNC: 7 MG/DL (ref 5–25)
BUN SERPL-MCNC: 7 MG/DL (ref 5–25)
BUN SERPL-MCNC: 8 MG/DL (ref 5–25)
CA-I BLD-SCNC: 1.01 MMOL/L
CALCIUM ALBUM COR SERPL-MCNC: 8.8 MG/DL (ref 8.3–10.1)
CALCIUM ALBUM COR SERPL-MCNC: 9.2 MG/DL (ref 8.3–10.1)
CALCIUM ALBUM COR SERPL-MCNC: 9.3 MG/DL (ref 8.3–10.1)
CALCIUM ALBUM COR SERPL-MCNC: 9.4 MG/DL (ref 8.3–10.1)
CALCIUM ALBUM COR SERPL-MCNC: 9.4 MG/DL (ref 8.3–10.1)
CALCIUM ALBUM COR SERPL-MCNC: 9.6 MG/DL (ref 8.3–10.1)
CALCIUM SERPL-MCNC: 6.9 MG/DL (ref 8.4–10.2)
CALCIUM SERPL-MCNC: 7.2 MG/DL (ref 8.4–10.2)
CALCIUM SERPL-MCNC: 7.2 MG/DL (ref 8.4–10.2)
CALCIUM SERPL-MCNC: 7.3 MG/DL (ref 8.3–10.1)
CALCIUM SERPL-MCNC: 7.3 MG/DL (ref 8.4–10.2)
CALCIUM SERPL-MCNC: 7.6 MG/DL (ref 8.4–10.2)
CALCIUM SERPL-MCNC: 7.8 MG/DL (ref 8.3–10.1)
CALCIUM SERPL-MCNC: 7.8 MG/DL (ref 8.3–10.1)
CALCIUM SERPL-MCNC: 7.9 MG/DL (ref 8.3–10.1)
CALCIUM SERPL-MCNC: 8 MG/DL (ref 8.3–10.1)
CALCIUM SERPL-MCNC: 8 MG/DL (ref 8.3–10.1)
CALCIUM SERPL-MCNC: 8.1 MG/DL (ref 8.4–10.2)
CALCIUM SERPL-MCNC: 8.2 MG/DL (ref 8.3–10.1)
CALCIUM SERPL-MCNC: 8.4 MG/DL (ref 8.3–10.1)
CALCIUM SERPL-MCNC: 8.5 MG/DL (ref 8.3–10.1)
CALCIUM SERPL-MCNC: 9 MG/DL (ref 8.4–10.2)
CHEST PAIN STATEMENT: NORMAL
CHLORIDE SERPL-SCNC: 100 MMOL/L (ref 96–108)
CHLORIDE SERPL-SCNC: 101 MMOL/L (ref 100–108)
CHLORIDE SERPL-SCNC: 101 MMOL/L (ref 100–108)
CHLORIDE SERPL-SCNC: 102 MMOL/L (ref 100–108)
CHLORIDE SERPL-SCNC: 103 MMOL/L (ref 100–108)
CHLORIDE SERPL-SCNC: 105 MMOL/L (ref 100–108)
CHLORIDE SERPL-SCNC: 107 MMOL/L (ref 100–108)
CHLORIDE SERPL-SCNC: 85 MMOL/L (ref 96–108)
CHLORIDE SERPL-SCNC: 89 MMOL/L (ref 96–108)
CHLORIDE SERPL-SCNC: 90 MMOL/L (ref 96–108)
CHLORIDE SERPL-SCNC: 90 MMOL/L (ref 96–108)
CHLORIDE SERPL-SCNC: 91 MMOL/L (ref 96–108)
CHLORIDE SERPL-SCNC: 96 MMOL/L (ref 96–108)
CHLORIDE SERPL-SCNC: 98 MMOL/L (ref 100–108)
CHLORIDE SERPL-SCNC: 99 MMOL/L (ref 100–108)
CK MB SERPL-MCNC: 10.3 % (ref 0–2.5)
CK MB SERPL-MCNC: 19.5 NG/ML (ref 0.1–5.9)
CK SERPL-CCNC: 190.2 U/L (ref 38–234)
CLARITY UR: ABNORMAL
CLARITY UR: CLEAR
CO2 SERPL-SCNC: 21 MMOL/L (ref 22–33)
CO2 SERPL-SCNC: 22 MMOL/L (ref 22–33)
CO2 SERPL-SCNC: 25 MMOL/L (ref 22–33)
CO2 SERPL-SCNC: 26 MMOL/L (ref 21–32)
CO2 SERPL-SCNC: 28 MMOL/L (ref 21–32)
CO2 SERPL-SCNC: 30 MMOL/L (ref 21–32)
CO2 SERPL-SCNC: 31 MMOL/L (ref 21–32)
CO2 SERPL-SCNC: 31 MMOL/L (ref 21–32)
CO2 SERPL-SCNC: 32 MMOL/L (ref 21–32)
CO2 SERPL-SCNC: 33 MMOL/L (ref 21–32)
COLOR UR: ABNORMAL
COLOR UR: YELLOW
CORTIS SERPL-MCNC: 3.3 UG/DL
CREAT SERPL-MCNC: 0.38 MG/DL (ref 0.4–1.1)
CREAT SERPL-MCNC: 0.38 MG/DL (ref 0.4–1.1)
CREAT SERPL-MCNC: 0.39 MG/DL (ref 0.4–1.1)
CREAT SERPL-MCNC: 0.4 MG/DL (ref 0.6–1.3)
CREAT SERPL-MCNC: 0.42 MG/DL (ref 0.6–1.3)
CREAT SERPL-MCNC: 0.43 MG/DL (ref 0.4–1.1)
CREAT SERPL-MCNC: 0.44 MG/DL (ref 0.4–1.1)
CREAT SERPL-MCNC: 0.45 MG/DL (ref 0.6–1.3)
CREAT SERPL-MCNC: 0.49 MG/DL (ref 0.6–1.3)
CREAT SERPL-MCNC: 0.5 MG/DL (ref 0.6–1.3)
CREAT SERPL-MCNC: 0.52 MG/DL (ref 0.6–1.3)
CREAT SERPL-MCNC: 0.52 MG/DL (ref 0.6–1.3)
CREAT SERPL-MCNC: 0.53 MG/DL (ref 0.6–1.3)
CREAT SERPL-MCNC: 0.58 MG/DL (ref 0.4–1.1)
CREAT SERPL-MCNC: 0.61 MG/DL (ref 0.6–1.3)
CREAT SERPL-MCNC: 0.62 MG/DL (ref 0.6–1.3)
CREAT SERPL-MCNC: 0.65 MG/DL (ref 0.6–1.3)
CREAT SERPL-MCNC: 0.9 MG/DL (ref 0.4–1.1)
CREAT UR-MCNC: 63.4 MG/DL
CRP SERPL QL: 109.7 MG/L
CRP SERPL QL: 152.6 MG/L
CRP SERPL QL: 154.3 MG/L
CRP SERPL QL: 207.8 MG/L
CRP SERPL QL: 229.1 MG/L
CRP SERPL QL: 248.5 MG/L
CRP SERPL QL: 79 MG/L
CRP SERPL QL: 85.4 MG/L
CRP SERPL QL: 92.9 MG/L
D DIMER PPP FEU-MCNC: 0.98 UG/ML FEU
D DIMER PPP FEU-MCNC: 1.03 UG/ML FEU
D DIMER PPP FEU-MCNC: 1.14 UG/ML FEU
D DIMER PPP FEU-MCNC: 1.2 UG/ML FEU
EOSINOPHIL # BLD AUTO: 0 THOUSAND/ΜL (ref 0–0.61)
EOSINOPHIL # BLD AUTO: 0.01 THOUSAND/ΜL (ref 0–0.61)
EOSINOPHIL # BLD AUTO: 0.02 THOUSAND/ΜL (ref 0–0.61)
EOSINOPHIL # BLD AUTO: 0.03 THOUSAND/ΜL (ref 0–0.61)
EOSINOPHIL # BLD MANUAL: 0 THOUSAND/UL (ref 0–0.4)
EOSINOPHIL NFR BLD AUTO: 0 % (ref 0–6)
EOSINOPHIL NFR BLD AUTO: 1 % (ref 0–6)
EOSINOPHIL NFR BLD MANUAL: 0 % (ref 0–6)
ERYTHROCYTE [DISTWIDTH] IN BLOOD BY AUTOMATED COUNT: 13.4 % (ref 11.6–15.1)
ERYTHROCYTE [DISTWIDTH] IN BLOOD BY AUTOMATED COUNT: 13.6 % (ref 11.6–15.1)
ERYTHROCYTE [DISTWIDTH] IN BLOOD BY AUTOMATED COUNT: 13.8 % (ref 11.6–15.1)
ERYTHROCYTE [DISTWIDTH] IN BLOOD BY AUTOMATED COUNT: 15 % (ref 11.6–15.1)
ERYTHROCYTE [DISTWIDTH] IN BLOOD BY AUTOMATED COUNT: 15.2 % (ref 11.6–15.1)
ERYTHROCYTE [DISTWIDTH] IN BLOOD BY AUTOMATED COUNT: 15.5 % (ref 11.6–15.1)
ERYTHROCYTE [DISTWIDTH] IN BLOOD BY AUTOMATED COUNT: 15.6 % (ref 11.6–15.1)
ERYTHROCYTE [DISTWIDTH] IN BLOOD BY AUTOMATED COUNT: 15.6 % (ref 11.6–15.1)
ERYTHROCYTE [DISTWIDTH] IN BLOOD BY AUTOMATED COUNT: 15.7 % (ref 11.6–15.1)
ERYTHROCYTE [DISTWIDTH] IN BLOOD BY AUTOMATED COUNT: 15.9 % (ref 11.6–15.1)
ERYTHROCYTE [DISTWIDTH] IN BLOOD BY AUTOMATED COUNT: 15.9 % (ref 11.6–15.1)
FERRITIN SERPL-MCNC: 165 NG/ML (ref 8–388)
FERRITIN SERPL-MCNC: 172 NG/ML (ref 8–388)
FERRITIN SERPL-MCNC: 355 NG/ML (ref 8–388)
FERRITIN SERPL-MCNC: 423 NG/ML (ref 8–388)
FERRITIN SERPL-MCNC: 453 NG/ML (ref 8–388)
FERRITIN SERPL-MCNC: 503 NG/ML (ref 8–388)
FERRITIN SERPL-MCNC: 507 NG/ML (ref 8–388)
FERRITIN SERPL-MCNC: 525 NG/ML (ref 8–388)
FERRITIN SERPL-MCNC: 687 NG/ML (ref 8–388)
FLUAV RNA RESP QL NAA+PROBE: NEGATIVE
FLUBV RNA RESP QL NAA+PROBE: NEGATIVE
GFR SERPL CREATININE-BSD FRML MDRD: 100 ML/MIN/1.73SQ M
GFR SERPL CREATININE-BSD FRML MDRD: 101 ML/MIN/1.73SQ M
GFR SERPL CREATININE-BSD FRML MDRD: 101 ML/MIN/1.73SQ M
GFR SERPL CREATININE-BSD FRML MDRD: 102 ML/MIN/1.73SQ M
GFR SERPL CREATININE-BSD FRML MDRD: 103 ML/MIN/1.73SQ M
GFR SERPL CREATININE-BSD FRML MDRD: 105 ML/MIN/1.73SQ M
GFR SERPL CREATININE-BSD FRML MDRD: 106 ML/MIN/1.73SQ M
GFR SERPL CREATININE-BSD FRML MDRD: 107 ML/MIN/1.73SQ M
GFR SERPL CREATININE-BSD FRML MDRD: 108 ML/MIN/1.73SQ M
GFR SERPL CREATININE-BSD FRML MDRD: 110 ML/MIN/1.73SQ M
GFR SERPL CREATININE-BSD FRML MDRD: 111 ML/MIN/1.73SQ M
GFR SERPL CREATININE-BSD FRML MDRD: 112 ML/MIN/1.73SQ M
GFR SERPL CREATININE-BSD FRML MDRD: 112 ML/MIN/1.73SQ M
GFR SERPL CREATININE-BSD FRML MDRD: 67 ML/MIN/1.73SQ M
GFR SERPL CREATININE-BSD FRML MDRD: 93 ML/MIN/1.73SQ M
GFR SERPL CREATININE-BSD FRML MDRD: 95 ML/MIN/1.73SQ M
GFR SERPL CREATININE-BSD FRML MDRD: 95 ML/MIN/1.73SQ M
GFR SERPL CREATININE-BSD FRML MDRD: 97 ML/MIN/1.73SQ M
GLUCOSE SERPL-MCNC: 100 MG/DL (ref 65–140)
GLUCOSE SERPL-MCNC: 104 MG/DL (ref 65–140)
GLUCOSE SERPL-MCNC: 105 MG/DL (ref 65–140)
GLUCOSE SERPL-MCNC: 105 MG/DL (ref 65–140)
GLUCOSE SERPL-MCNC: 111 MG/DL (ref 65–140)
GLUCOSE SERPL-MCNC: 112 MG/DL (ref 65–140)
GLUCOSE SERPL-MCNC: 115 MG/DL (ref 65–140)
GLUCOSE SERPL-MCNC: 116 MG/DL (ref 65–140)
GLUCOSE SERPL-MCNC: 121 MG/DL (ref 65–140)
GLUCOSE SERPL-MCNC: 132 MG/DL (ref 65–140)
GLUCOSE SERPL-MCNC: 147 MG/DL (ref 65–140)
GLUCOSE SERPL-MCNC: 148 MG/DL (ref 65–140)
GLUCOSE SERPL-MCNC: 157 MG/DL (ref 65–140)
GLUCOSE SERPL-MCNC: 171 MG/DL (ref 65–140)
GLUCOSE SERPL-MCNC: 175 MG/DL (ref 65–140)
GLUCOSE SERPL-MCNC: 208 MG/DL (ref 65–140)
GLUCOSE SERPL-MCNC: 24 MG/DL (ref 65–140)
GLUCOSE SERPL-MCNC: 25 MG/DL (ref 65–140)
GLUCOSE SERPL-MCNC: 25 MG/DL (ref 65–140)
GLUCOSE SERPL-MCNC: 30 MG/DL (ref 65–140)
GLUCOSE SERPL-MCNC: 341 MG/DL (ref 65–140)
GLUCOSE SERPL-MCNC: 62 MG/DL (ref 65–140)
GLUCOSE SERPL-MCNC: 62 MG/DL (ref 65–140)
GLUCOSE SERPL-MCNC: 63 MG/DL (ref 65–140)
GLUCOSE SERPL-MCNC: 70 MG/DL (ref 65–140)
GLUCOSE SERPL-MCNC: 70 MG/DL (ref 65–140)
GLUCOSE SERPL-MCNC: 73 MG/DL (ref 65–140)
GLUCOSE SERPL-MCNC: 77 MG/DL (ref 65–140)
GLUCOSE SERPL-MCNC: 79 MG/DL (ref 65–140)
GLUCOSE SERPL-MCNC: 80 MG/DL (ref 65–140)
GLUCOSE SERPL-MCNC: 81 MG/DL (ref 65–140)
GLUCOSE SERPL-MCNC: 82 MG/DL (ref 65–140)
GLUCOSE SERPL-MCNC: 86 MG/DL (ref 65–140)
GLUCOSE SERPL-MCNC: 91 MG/DL (ref 65–140)
GLUCOSE SERPL-MCNC: 95 MG/DL (ref 65–140)
GLUCOSE SERPL-MCNC: 96 MG/DL (ref 65–140)
GLUCOSE SERPL-MCNC: 98 MG/DL (ref 65–140)
GLUCOSE UR STRIP-MCNC: NEGATIVE MG/DL
GLUCOSE UR STRIP-MCNC: NEGATIVE MG/DL
HCT VFR BLD AUTO: 24.7 % (ref 34.8–46.1)
HCT VFR BLD AUTO: 25.9 % (ref 34.8–46.1)
HCT VFR BLD AUTO: 26.7 % (ref 34.8–46.1)
HCT VFR BLD AUTO: 27.9 % (ref 34.8–46.1)
HCT VFR BLD AUTO: 28.3 % (ref 34.8–46.1)
HCT VFR BLD AUTO: 29.8 % (ref 34.8–46.1)
HCT VFR BLD AUTO: 30 % (ref 34.8–46.1)
HCT VFR BLD AUTO: 32.7 % (ref 34.8–46.1)
HCT VFR BLD AUTO: 34.9 % (ref 34.8–46.1)
HCT VFR BLD AUTO: 36.4 % (ref 34.8–46.1)
HCT VFR BLD AUTO: 37 % (ref 34.8–46.1)
HGB BLD-MCNC: 11.4 G/DL (ref 11.5–15.4)
HGB BLD-MCNC: 11.5 G/DL (ref 11.5–15.4)
HGB BLD-MCNC: 11.5 G/DL (ref 11.5–15.4)
HGB BLD-MCNC: 12.9 G/DL (ref 11.5–15.4)
HGB BLD-MCNC: 7.9 G/DL (ref 11.5–15.4)
HGB BLD-MCNC: 8.3 G/DL (ref 11.5–15.4)
HGB BLD-MCNC: 8.7 G/DL (ref 11.5–15.4)
HGB BLD-MCNC: 8.8 G/DL (ref 11.5–15.4)
HGB BLD-MCNC: 9.1 G/DL (ref 11.5–15.4)
HGB BLD-MCNC: 9.7 G/DL (ref 11.5–15.4)
HGB BLD-MCNC: 9.8 G/DL (ref 11.5–15.4)
HGB UR QL STRIP.AUTO: ABNORMAL
HGB UR QL STRIP.AUTO: NEGATIVE
IMM GRANULOCYTES # BLD AUTO: 0.01 THOUSAND/UL (ref 0–0.2)
IMM GRANULOCYTES # BLD AUTO: 0.01 THOUSAND/UL (ref 0–0.2)
IMM GRANULOCYTES # BLD AUTO: 0.05 THOUSAND/UL (ref 0–0.2)
IMM GRANULOCYTES # BLD AUTO: 0.09 THOUSAND/UL (ref 0–0.2)
IMM GRANULOCYTES NFR BLD AUTO: 0 % (ref 0–2)
IMM GRANULOCYTES NFR BLD AUTO: 0 % (ref 0–2)
IMM GRANULOCYTES NFR BLD AUTO: 1 % (ref 0–2)
IMM GRANULOCYTES NFR BLD AUTO: 1 % (ref 0–2)
IRON SATN MFR SERPL: 13 %
IRON SERPL-MCNC: 28 UG/DL (ref 50–170)
KETONES UR STRIP-MCNC: ABNORMAL MG/DL
KETONES UR STRIP-MCNC: NEGATIVE MG/DL
LACTATE SERPL-SCNC: 1.9 MMOL/L (ref 0–2)
LACTATE SERPL-SCNC: 2.1 MMOL/L (ref 0–2)
LEUKOCYTE ESTERASE UR QL STRIP: ABNORMAL
LEUKOCYTE ESTERASE UR QL STRIP: NEGATIVE
LYMPHOCYTES # BLD AUTO: 0.09 THOUSAND/UL (ref 0.6–4.47)
LYMPHOCYTES # BLD AUTO: 0.5 THOUSANDS/ΜL (ref 0.6–4.47)
LYMPHOCYTES # BLD AUTO: 0.58 THOUSANDS/ΜL (ref 0.6–4.47)
LYMPHOCYTES # BLD AUTO: 0.65 THOUSANDS/ΜL (ref 0.6–4.47)
LYMPHOCYTES # BLD AUTO: 1 % (ref 14–44)
LYMPHOCYTES # BLD AUTO: 1.43 THOUSANDS/ΜL (ref 0.6–4.47)
LYMPHOCYTES NFR BLD AUTO: 13 % (ref 14–44)
LYMPHOCYTES NFR BLD AUTO: 23 % (ref 14–44)
LYMPHOCYTES NFR BLD AUTO: 6 % (ref 14–44)
LYMPHOCYTES NFR BLD AUTO: 8 % (ref 14–44)
MAGNESIUM SERPL-MCNC: 1.8 MG/DL (ref 1.6–2.6)
MAGNESIUM SERPL-MCNC: 1.9 MG/DL (ref 1.6–2.6)
MAGNESIUM SERPL-MCNC: 1.9 MG/DL (ref 1.6–2.6)
MAGNESIUM SERPL-MCNC: 2 MG/DL (ref 1.6–2.6)
MAGNESIUM SERPL-MCNC: 2.1 MG/DL (ref 1.6–2.6)
MAX DIASTOLIC BP: 70 MMHG
MAX HEART RATE: 162 BPM
MAX PREDICTED HEART RATE: 155 BPM
MAX. SYSTOLIC BP: 104 MMHG
MCH RBC QN AUTO: 26.9 PG (ref 26.8–34.3)
MCH RBC QN AUTO: 27 PG (ref 26.8–34.3)
MCH RBC QN AUTO: 27.4 PG (ref 26.8–34.3)
MCH RBC QN AUTO: 27.4 PG (ref 26.8–34.3)
MCH RBC QN AUTO: 27.5 PG (ref 26.8–34.3)
MCH RBC QN AUTO: 27.6 PG (ref 26.8–34.3)
MCH RBC QN AUTO: 27.7 PG (ref 26.8–34.3)
MCH RBC QN AUTO: 27.7 PG (ref 26.8–34.3)
MCH RBC QN AUTO: 28.4 PG (ref 26.8–34.3)
MCHC RBC AUTO-ENTMCNC: 31.1 G/DL (ref 31.4–37.4)
MCHC RBC AUTO-ENTMCNC: 31.5 G/DL (ref 31.4–37.4)
MCHC RBC AUTO-ENTMCNC: 32 G/DL (ref 31.4–37.4)
MCHC RBC AUTO-ENTMCNC: 32 G/DL (ref 31.4–37.4)
MCHC RBC AUTO-ENTMCNC: 32.2 G/DL (ref 31.4–37.4)
MCHC RBC AUTO-ENTMCNC: 32.3 G/DL (ref 31.4–37.4)
MCHC RBC AUTO-ENTMCNC: 32.6 G/DL (ref 31.4–37.4)
MCHC RBC AUTO-ENTMCNC: 32.9 G/DL (ref 31.4–37.4)
MCHC RBC AUTO-ENTMCNC: 33 G/DL (ref 31.4–37.4)
MCHC RBC AUTO-ENTMCNC: 34.9 G/DL (ref 31.4–37.4)
MCHC RBC AUTO-ENTMCNC: 35.4 G/DL (ref 31.4–37.4)
MCV RBC AUTO: 79 FL (ref 82–98)
MCV RBC AUTO: 80 FL (ref 82–98)
MCV RBC AUTO: 84 FL (ref 82–98)
MCV RBC AUTO: 85 FL (ref 82–98)
MCV RBC AUTO: 86 FL (ref 82–98)
MCV RBC AUTO: 87 FL (ref 82–98)
MCV RBC AUTO: 89 FL (ref 82–98)
MONOCYTES # BLD AUTO: 0.2 THOUSAND/ΜL (ref 0.17–1.22)
MONOCYTES # BLD AUTO: 0.21 THOUSAND/ΜL (ref 0.17–1.22)
MONOCYTES # BLD AUTO: 0.27 THOUSAND/ΜL (ref 0.17–1.22)
MONOCYTES # BLD AUTO: 0.36 THOUSAND/UL (ref 0–1.22)
MONOCYTES # BLD AUTO: 0.41 THOUSAND/ΜL (ref 0.17–1.22)
MONOCYTES NFR BLD AUTO: 3 % (ref 4–12)
MONOCYTES NFR BLD AUTO: 4 % (ref 4–12)
MONOCYTES NFR BLD: 4 % (ref 4–12)
MUCOUS THREADS UR QL AUTO: ABNORMAL
NEUTROPHILS # BLD AUTO: 4.2 THOUSANDS/ΜL (ref 1.85–7.62)
NEUTROPHILS # BLD AUTO: 4.42 THOUSANDS/ΜL (ref 1.85–7.62)
NEUTROPHILS # BLD AUTO: 5.46 THOUSANDS/ΜL (ref 1.85–7.62)
NEUTROPHILS # BLD AUTO: 8.56 THOUSANDS/ΜL (ref 1.85–7.62)
NEUTROPHILS # BLD MANUAL: 8.65 THOUSAND/UL (ref 1.85–7.62)
NEUTS BAND NFR BLD MANUAL: 10 % (ref 0–8)
NEUTS SEG NFR BLD AUTO: 73 % (ref 43–75)
NEUTS SEG NFR BLD AUTO: 83 % (ref 43–75)
NEUTS SEG NFR BLD AUTO: 85 % (ref 43–75)
NEUTS SEG NFR BLD AUTO: 87 % (ref 43–75)
NEUTS SEG NFR BLD AUTO: 89 % (ref 43–75)
NITRITE UR QL STRIP: NEGATIVE
NITRITE UR QL STRIP: NEGATIVE
NON-SQ EPI CELLS URNS QL MICRO: ABNORMAL /HPF
NON-SQ EPI CELLS URNS QL MICRO: ABNORMAL /HPF
NRBC BLD AUTO-RTO: 0 /100 WBCS
OSMOLALITY UR/SERPL-RTO: 245 MMOL/KG (ref 282–298)
OSMOLALITY UR: 356 MMOL/KG
P AXIS: 77 DEGREES
P AXIS: 79 DEGREES
PH UR STRIP.AUTO: 5.5 [PH]
PH UR STRIP.AUTO: 8 [PH]
PHOSPHATE SERPL-MCNC: 1.4 MG/DL (ref 2.5–5)
PHOSPHATE SERPL-MCNC: 1.7 MG/DL (ref 2.5–5)
PHOSPHATE SERPL-MCNC: 1.9 MG/DL (ref 2.3–4.1)
PHOSPHATE SERPL-MCNC: 1.9 MG/DL (ref 2.5–5)
PHOSPHATE SERPL-MCNC: 4.2 MG/DL (ref 2.3–4.1)
PHOSPHATE SERPL-MCNC: 4.9 MG/DL (ref 2.3–4.1)
PLATELET # BLD AUTO: 210 THOUSANDS/UL (ref 149–390)
PLATELET # BLD AUTO: 247 THOUSANDS/UL (ref 149–390)
PLATELET # BLD AUTO: 250 THOUSANDS/UL (ref 149–390)
PLATELET # BLD AUTO: 254 THOUSANDS/UL (ref 149–390)
PLATELET # BLD AUTO: 263 THOUSANDS/UL (ref 149–390)
PLATELET # BLD AUTO: 265 THOUSANDS/UL (ref 149–390)
PLATELET # BLD AUTO: 273 THOUSANDS/UL (ref 149–390)
PLATELET # BLD AUTO: 292 THOUSANDS/UL (ref 149–390)
PLATELET # BLD AUTO: 295 THOUSANDS/UL (ref 149–390)
PLATELET # BLD AUTO: 344 THOUSANDS/UL (ref 149–390)
PLATELET # BLD AUTO: 371 THOUSANDS/UL (ref 149–390)
PLATELET BLD QL SMEAR: ADEQUATE
PMV BLD AUTO: 10.1 FL (ref 8.9–12.7)
PMV BLD AUTO: 10.1 FL (ref 8.9–12.7)
PMV BLD AUTO: 10.2 FL (ref 8.9–12.7)
PMV BLD AUTO: 10.3 FL (ref 8.9–12.7)
PMV BLD AUTO: 10.4 FL (ref 8.9–12.7)
PMV BLD AUTO: 10.4 FL (ref 8.9–12.7)
PMV BLD AUTO: 10.6 FL (ref 8.9–12.7)
PMV BLD AUTO: 9.4 FL (ref 8.9–12.7)
PMV BLD AUTO: 9.6 FL (ref 8.9–12.7)
PMV BLD AUTO: 9.6 FL (ref 8.9–12.7)
PMV BLD AUTO: 9.7 FL (ref 8.9–12.7)
POLYCHROMASIA BLD QL SMEAR: PRESENT
POTASSIUM SERPL-SCNC: 3 MMOL/L (ref 3.5–5.3)
POTASSIUM SERPL-SCNC: 3.1 MMOL/L (ref 3.5–5)
POTASSIUM SERPL-SCNC: 3.2 MMOL/L (ref 3.5–5.3)
POTASSIUM SERPL-SCNC: 3.3 MMOL/L (ref 3.5–5.3)
POTASSIUM SERPL-SCNC: 3.4 MMOL/L (ref 3.5–5)
POTASSIUM SERPL-SCNC: 3.5 MMOL/L (ref 3.5–5)
POTASSIUM SERPL-SCNC: 3.5 MMOL/L (ref 3.5–5.3)
POTASSIUM SERPL-SCNC: 3.7 MMOL/L (ref 3.5–5)
POTASSIUM SERPL-SCNC: 3.7 MMOL/L (ref 3.5–5)
POTASSIUM SERPL-SCNC: 3.7 MMOL/L (ref 3.5–5.3)
POTASSIUM SERPL-SCNC: 3.8 MMOL/L (ref 3.5–5.3)
POTASSIUM SERPL-SCNC: 3.8 MMOL/L (ref 3.5–5.3)
POTASSIUM SERPL-SCNC: 4.1 MMOL/L (ref 3.5–5)
POTASSIUM SERPL-SCNC: 4.2 MMOL/L (ref 3.5–5.3)
POTASSIUM SERPL-SCNC: 4.2 MMOL/L (ref 3.5–5.3)
POTASSIUM SERPL-SCNC: 4.5 MMOL/L (ref 3.5–5)
PR INTERVAL: 120 MS
PR INTERVAL: 164 MS
PROCALCITONIN SERPL-MCNC: 2.13 NG/ML
PROCALCITONIN SERPL-MCNC: 3.95 NG/ML
PROT SERPL-MCNC: 4.8 G/DL (ref 6.4–8.2)
PROT SERPL-MCNC: 4.9 G/DL (ref 6.4–8.2)
PROT SERPL-MCNC: 5.3 G/DL (ref 6.4–8.2)
PROT SERPL-MCNC: 5.4 G/DL (ref 6.4–8.2)
PROT SERPL-MCNC: 5.6 G/DL (ref 6.4–8.2)
PROT SERPL-MCNC: 5.8 G/DL (ref 6.4–8.2)
PROT SERPL-MCNC: 5.8 G/DL (ref 6.4–8.2)
PROT SERPL-MCNC: 6 G/DL (ref 6.4–8.2)
PROT SERPL-MCNC: 7.2 G/DL (ref 6.4–8.3)
PROT UR STRIP-MCNC: NEGATIVE MG/DL
PROT UR STRIP-MCNC: NEGATIVE MG/DL
PROTOCOL NAME: NORMAL
QRS AXIS: -6 DEGREES
QRS AXIS: 56 DEGREES
QRSD INTERVAL: 56 MS
QRSD INTERVAL: 92 MS
QT INTERVAL: 308 MS
QT INTERVAL: 341 MS
QTC INTERVAL: 440 MS
QTC INTERVAL: 455 MS
RBC # BLD AUTO: 2.93 MILLION/UL (ref 3.81–5.12)
RBC # BLD AUTO: 3.08 MILLION/UL (ref 3.81–5.12)
RBC # BLD AUTO: 3.17 MILLION/UL (ref 3.81–5.12)
RBC # BLD AUTO: 3.21 MILLION/UL (ref 3.81–5.12)
RBC # BLD AUTO: 3.28 MILLION/UL (ref 3.81–5.12)
RBC # BLD AUTO: 3.53 MILLION/UL (ref 3.81–5.12)
RBC # BLD AUTO: 3.56 MILLION/UL (ref 3.81–5.12)
RBC # BLD AUTO: 4.12 MILLION/UL (ref 3.81–5.12)
RBC # BLD AUTO: 4.17 MILLION/UL (ref 3.81–5.12)
RBC # BLD AUTO: 4.18 MILLION/UL (ref 3.81–5.12)
RBC # BLD AUTO: 4.55 MILLION/UL (ref 3.81–5.12)
RBC #/AREA URNS AUTO: ABNORMAL /HPF
RBC #/AREA URNS AUTO: ABNORMAL /HPF
RBC MORPH BLD: PRESENT
REASON FOR TERMINATION: NORMAL
RSV RNA RESP QL NAA+PROBE: NEGATIVE
SARS-COV-2 RNA RESP QL NAA+PROBE: NEGATIVE
SARS-COV-2 RNA RESP QL NAA+PROBE: POSITIVE
SODIUM 24H UR-SCNC: 56 MOL/L
SODIUM SERPL-SCNC: 116 MMOL/L (ref 133–145)
SODIUM SERPL-SCNC: 118 MMOL/L (ref 133–145)
SODIUM SERPL-SCNC: 119 MMOL/L (ref 133–145)
SODIUM SERPL-SCNC: 120 MMOL/L (ref 133–145)
SODIUM SERPL-SCNC: 121 MMOL/L (ref 133–145)
SODIUM SERPL-SCNC: 130 MMOL/L (ref 133–145)
SODIUM SERPL-SCNC: 132 MMOL/L (ref 133–145)
SODIUM SERPL-SCNC: 133 MMOL/L (ref 136–145)
SODIUM SERPL-SCNC: 136 MMOL/L (ref 136–145)
SODIUM SERPL-SCNC: 138 MMOL/L (ref 136–145)
SODIUM SERPL-SCNC: 139 MMOL/L (ref 136–145)
SODIUM SERPL-SCNC: 139 MMOL/L (ref 136–145)
SODIUM SERPL-SCNC: 140 MMOL/L (ref 136–145)
SODIUM SERPL-SCNC: 142 MMOL/L (ref 136–145)
SODIUM SERPL-SCNC: 143 MMOL/L (ref 136–145)
SP GR UR STRIP.AUTO: 1.01 (ref 1–1.03)
SP GR UR STRIP.AUTO: 1.02 (ref 1–1.03)
T WAVE AXIS: 85 DEGREES
T WAVE AXIS: 99 DEGREES
T4 FREE SERPL-MCNC: 1.35 NG/DL (ref 0.76–1.46)
TARGET HR FORMULA: NORMAL
TARGETS BLD QL SMEAR: PRESENT
TEST INDICATION: NORMAL
TIBC SERPL-MCNC: 210 UG/DL (ref 250–450)
TIME IN EXERCISE PHASE: NORMAL
TOTAL CELLS COUNTED SPEC: 100
TROPONIN I SERPL-MCNC: 0.25 NG/ML
TROPONIN I SERPL-MCNC: 0.28 NG/ML
TROPONIN I SERPL-MCNC: 0.51 NG/ML (ref 0–0.07)
TROPONIN I SERPL-MCNC: 2.52 NG/ML (ref 0–0.07)
TROPONIN I SERPL-MCNC: 3.5 NG/ML (ref 0–0.07)
TROPONIN I SERPL-MCNC: 3.64 NG/ML (ref 0–0.07)
TSH SERPL DL<=0.05 MIU/L-ACNC: 0.12 UIU/ML (ref 0.34–5.6)
UROBILINOGEN UR QL STRIP.AUTO: 0.2 E.U./DL
UROBILINOGEN UR QL STRIP.AUTO: 0.2 E.U./DL
VENTRICULAR RATE: 107 BPM
VENTRICULAR RATE: 123 BPM
WBC # BLD AUTO: 10.73 THOUSAND/UL (ref 4.31–10.16)
WBC # BLD AUTO: 5.09 THOUSAND/UL (ref 4.31–10.16)
WBC # BLD AUTO: 6.11 THOUSAND/UL (ref 4.31–10.16)
WBC # BLD AUTO: 6.35 THOUSAND/UL (ref 4.31–10.16)
WBC # BLD AUTO: 6.99 THOUSAND/UL (ref 4.31–10.16)
WBC # BLD AUTO: 7.18 THOUSAND/UL (ref 4.31–10.16)
WBC # BLD AUTO: 8.19 THOUSAND/UL (ref 4.31–10.16)
WBC # BLD AUTO: 9.11 THOUSAND/UL (ref 4.31–10.16)
WBC # BLD AUTO: 9.26 THOUSAND/UL (ref 4.31–10.16)
WBC # BLD AUTO: 9.61 THOUSAND/UL (ref 4.31–10.16)
WBC # BLD AUTO: 9.81 THOUSAND/UL (ref 4.31–10.16)
WBC #/AREA URNS AUTO: ABNORMAL /HPF
WBC #/AREA URNS AUTO: ABNORMAL /HPF

## 2021-01-01 PROCEDURE — 85379 FIBRIN DEGRADATION QUANT: CPT | Performed by: FAMILY MEDICINE

## 2021-01-01 PROCEDURE — 36415 COLL VENOUS BLD VENIPUNCTURE: CPT | Performed by: EMERGENCY MEDICINE

## 2021-01-01 PROCEDURE — 97163 PT EVAL HIGH COMPLEX 45 MIN: CPT

## 2021-01-01 PROCEDURE — 84443 ASSAY THYROID STIM HORMONE: CPT | Performed by: EMERGENCY MEDICINE

## 2021-01-01 PROCEDURE — 99232 SBSQ HOSP IP/OBS MODERATE 35: CPT | Performed by: INTERNAL MEDICINE

## 2021-01-01 PROCEDURE — 80048 BASIC METABOLIC PNL TOTAL CA: CPT | Performed by: INTERNAL MEDICINE

## 2021-01-01 PROCEDURE — 99232 SBSQ HOSP IP/OBS MODERATE 35: CPT | Performed by: FAMILY MEDICINE

## 2021-01-01 PROCEDURE — 96361 HYDRATE IV INFUSION ADD-ON: CPT

## 2021-01-01 PROCEDURE — 78452 HT MUSCLE IMAGE SPECT MULT: CPT

## 2021-01-01 PROCEDURE — 82728 ASSAY OF FERRITIN: CPT | Performed by: FAMILY MEDICINE

## 2021-01-01 PROCEDURE — 83735 ASSAY OF MAGNESIUM: CPT | Performed by: FAMILY MEDICINE

## 2021-01-01 PROCEDURE — 97110 THERAPEUTIC EXERCISES: CPT

## 2021-01-01 PROCEDURE — 83935 ASSAY OF URINE OSMOLALITY: CPT | Performed by: INTERNAL MEDICINE

## 2021-01-01 PROCEDURE — 85027 COMPLETE CBC AUTOMATED: CPT | Performed by: FAMILY MEDICINE

## 2021-01-01 PROCEDURE — 87040 BLOOD CULTURE FOR BACTERIA: CPT | Performed by: EMERGENCY MEDICINE

## 2021-01-01 PROCEDURE — 72128 CT CHEST SPINE W/O DYE: CPT

## 2021-01-01 PROCEDURE — 82948 REAGENT STRIP/BLOOD GLUCOSE: CPT

## 2021-01-01 PROCEDURE — 86140 C-REACTIVE PROTEIN: CPT | Performed by: FAMILY MEDICINE

## 2021-01-01 PROCEDURE — 97535 SELF CARE MNGMENT TRAINING: CPT

## 2021-01-01 PROCEDURE — 99285 EMERGENCY DEPT VISIT HI MDM: CPT

## 2021-01-01 PROCEDURE — 82728 ASSAY OF FERRITIN: CPT | Performed by: INTERNAL MEDICINE

## 2021-01-01 PROCEDURE — 92610 EVALUATE SWALLOWING FUNCTION: CPT

## 2021-01-01 PROCEDURE — 85027 COMPLETE CBC AUTOMATED: CPT | Performed by: NURSE PRACTITIONER

## 2021-01-01 PROCEDURE — 70450 CT HEAD/BRAIN W/O DYE: CPT

## 2021-01-01 PROCEDURE — 99448 NTRPROF PH1/NTRNET/EHR 21-30: CPT | Performed by: INTERNAL MEDICINE

## 2021-01-01 PROCEDURE — 99223 1ST HOSP IP/OBS HIGH 75: CPT | Performed by: INTERNAL MEDICINE

## 2021-01-01 PROCEDURE — 94760 N-INVAS EAR/PLS OXIMETRY 1: CPT

## 2021-01-01 PROCEDURE — 85027 COMPLETE CBC AUTOMATED: CPT | Performed by: INTERNAL MEDICINE

## 2021-01-01 PROCEDURE — 97116 GAIT TRAINING THERAPY: CPT

## 2021-01-01 PROCEDURE — 93018 CV STRESS TEST I&R ONLY: CPT | Performed by: INTERNAL MEDICINE

## 2021-01-01 PROCEDURE — 84145 PROCALCITONIN (PCT): CPT | Performed by: PHYSICIAN ASSISTANT

## 2021-01-01 PROCEDURE — 85007 BL SMEAR W/DIFF WBC COUNT: CPT | Performed by: INTERNAL MEDICINE

## 2021-01-01 PROCEDURE — 84484 ASSAY OF TROPONIN QUANT: CPT | Performed by: INTERNAL MEDICINE

## 2021-01-01 PROCEDURE — 86140 C-REACTIVE PROTEIN: CPT | Performed by: NURSE PRACTITIONER

## 2021-01-01 PROCEDURE — 87635 SARS-COV-2 COVID-19 AMP PRB: CPT | Performed by: STUDENT IN AN ORGANIZED HEALTH CARE EDUCATION/TRAINING PROGRAM

## 2021-01-01 PROCEDURE — 93005 ELECTROCARDIOGRAM TRACING: CPT

## 2021-01-01 PROCEDURE — 82570 ASSAY OF URINE CREATININE: CPT | Performed by: INTERNAL MEDICINE

## 2021-01-01 PROCEDURE — 83735 ASSAY OF MAGNESIUM: CPT | Performed by: EMERGENCY MEDICINE

## 2021-01-01 PROCEDURE — 83550 IRON BINDING TEST: CPT | Performed by: INTERNAL MEDICINE

## 2021-01-01 PROCEDURE — 99222 1ST HOSP IP/OBS MODERATE 55: CPT | Performed by: INTERNAL MEDICINE

## 2021-01-01 PROCEDURE — 84100 ASSAY OF PHOSPHORUS: CPT | Performed by: INTERNAL MEDICINE

## 2021-01-01 PROCEDURE — 82728 ASSAY OF FERRITIN: CPT | Performed by: NURSE PRACTITIONER

## 2021-01-01 PROCEDURE — 84100 ASSAY OF PHOSPHORUS: CPT | Performed by: PHYSICIAN ASSISTANT

## 2021-01-01 PROCEDURE — 83540 ASSAY OF IRON: CPT | Performed by: INTERNAL MEDICINE

## 2021-01-01 PROCEDURE — 99239 HOSP IP/OBS DSCHRG MGMT >30: CPT | Performed by: INTERNAL MEDICINE

## 2021-01-01 PROCEDURE — 82533 TOTAL CORTISOL: CPT | Performed by: EMERGENCY MEDICINE

## 2021-01-01 PROCEDURE — 80053 COMPREHEN METABOLIC PANEL: CPT | Performed by: INTERNAL MEDICINE

## 2021-01-01 PROCEDURE — 80053 COMPREHEN METABOLIC PANEL: CPT | Performed by: FAMILY MEDICINE

## 2021-01-01 PROCEDURE — G1004 CDSM NDSC: HCPCS

## 2021-01-01 PROCEDURE — 92526 ORAL FUNCTION THERAPY: CPT

## 2021-01-01 PROCEDURE — 84100 ASSAY OF PHOSPHORUS: CPT | Performed by: EMERGENCY MEDICINE

## 2021-01-01 PROCEDURE — 96360 HYDRATION IV INFUSION INIT: CPT

## 2021-01-01 PROCEDURE — 99233 SBSQ HOSP IP/OBS HIGH 50: CPT | Performed by: INTERNAL MEDICINE

## 2021-01-01 PROCEDURE — 85025 COMPLETE CBC W/AUTO DIFF WBC: CPT | Performed by: INTERNAL MEDICINE

## 2021-01-01 PROCEDURE — A9502 TC99M TETROFOSMIN: HCPCS

## 2021-01-01 PROCEDURE — 82306 VITAMIN D 25 HYDROXY: CPT | Performed by: PHYSICIAN ASSISTANT

## 2021-01-01 PROCEDURE — 82550 ASSAY OF CK (CPK): CPT | Performed by: EMERGENCY MEDICINE

## 2021-01-01 PROCEDURE — 97530 THERAPEUTIC ACTIVITIES: CPT

## 2021-01-01 PROCEDURE — 85379 FIBRIN DEGRADATION QUANT: CPT | Performed by: NURSE PRACTITIONER

## 2021-01-01 PROCEDURE — 93306 TTE W/DOPPLER COMPLETE: CPT | Performed by: INTERNAL MEDICINE

## 2021-01-01 PROCEDURE — 94002 VENT MGMT INPAT INIT DAY: CPT

## 2021-01-01 PROCEDURE — 0241U HB NFCT DS VIR RESP RNA 4 TRGT: CPT | Performed by: EMERGENCY MEDICINE

## 2021-01-01 PROCEDURE — 84484 ASSAY OF TROPONIN QUANT: CPT | Performed by: PHYSICIAN ASSISTANT

## 2021-01-01 PROCEDURE — 81001 URINALYSIS AUTO W/SCOPE: CPT | Performed by: INTERNAL MEDICINE

## 2021-01-01 PROCEDURE — 84484 ASSAY OF TROPONIN QUANT: CPT | Performed by: EMERGENCY MEDICINE

## 2021-01-01 PROCEDURE — 99285 EMERGENCY DEPT VISIT HI MDM: CPT | Performed by: EMERGENCY MEDICINE

## 2021-01-01 PROCEDURE — XW033E5 INTRODUCTION OF REMDESIVIR ANTI-INFECTIVE INTO PERIPHERAL VEIN, PERCUTANEOUS APPROACH, NEW TECHNOLOGY GROUP 5: ICD-10-PCS | Performed by: FAMILY MEDICINE

## 2021-01-01 PROCEDURE — 97167 OT EVAL HIGH COMPLEX 60 MIN: CPT

## 2021-01-01 PROCEDURE — 83735 ASSAY OF MAGNESIUM: CPT | Performed by: PHYSICIAN ASSISTANT

## 2021-01-01 PROCEDURE — 82947 ASSAY GLUCOSE BLOOD QUANT: CPT | Performed by: FAMILY MEDICINE

## 2021-01-01 PROCEDURE — 84300 ASSAY OF URINE SODIUM: CPT | Performed by: INTERNAL MEDICINE

## 2021-01-01 PROCEDURE — 84439 ASSAY OF FREE THYROXINE: CPT | Performed by: INTERNAL MEDICINE

## 2021-01-01 PROCEDURE — 80053 COMPREHEN METABOLIC PANEL: CPT | Performed by: NURSE PRACTITIONER

## 2021-01-01 PROCEDURE — 93010 ELECTROCARDIOGRAM REPORT: CPT | Performed by: INTERNAL MEDICINE

## 2021-01-01 PROCEDURE — 78452 HT MUSCLE IMAGE SPECT MULT: CPT | Performed by: INTERNAL MEDICINE

## 2021-01-01 PROCEDURE — 71045 X-RAY EXAM CHEST 1 VIEW: CPT

## 2021-01-01 PROCEDURE — 93016 CV STRESS TEST SUPVJ ONLY: CPT | Performed by: INTERNAL MEDICINE

## 2021-01-01 PROCEDURE — 80053 COMPREHEN METABOLIC PANEL: CPT | Performed by: PHYSICIAN ASSISTANT

## 2021-01-01 PROCEDURE — 83605 ASSAY OF LACTIC ACID: CPT | Performed by: EMERGENCY MEDICINE

## 2021-01-01 PROCEDURE — 99233 SBSQ HOSP IP/OBS HIGH 50: CPT | Performed by: PHYSICIAN ASSISTANT

## 2021-01-01 PROCEDURE — 85025 COMPLETE CBC W/AUTO DIFF WBC: CPT | Performed by: PHYSICIAN ASSISTANT

## 2021-01-01 PROCEDURE — G0425 INPT/ED TELECONSULT30: HCPCS | Performed by: PSYCHIATRY & NEUROLOGY

## 2021-01-01 PROCEDURE — 81001 URINALYSIS AUTO W/SCOPE: CPT | Performed by: EMERGENCY MEDICINE

## 2021-01-01 PROCEDURE — 80053 COMPREHEN METABOLIC PANEL: CPT | Performed by: EMERGENCY MEDICINE

## 2021-01-01 PROCEDURE — 72125 CT NECK SPINE W/O DYE: CPT

## 2021-01-01 PROCEDURE — 82330 ASSAY OF CALCIUM: CPT | Performed by: INTERNAL MEDICINE

## 2021-01-01 PROCEDURE — 85025 COMPLETE CBC W/AUTO DIFF WBC: CPT | Performed by: EMERGENCY MEDICINE

## 2021-01-01 PROCEDURE — 72131 CT LUMBAR SPINE W/O DYE: CPT

## 2021-01-01 PROCEDURE — 94003 VENT MGMT INPAT SUBQ DAY: CPT

## 2021-01-01 PROCEDURE — 83930 ASSAY OF BLOOD OSMOLALITY: CPT | Performed by: INTERNAL MEDICINE

## 2021-01-01 PROCEDURE — 82553 CREATINE MB FRACTION: CPT | Performed by: EMERGENCY MEDICINE

## 2021-01-01 PROCEDURE — 93017 CV STRESS TEST TRACING ONLY: CPT

## 2021-01-01 PROCEDURE — 83735 ASSAY OF MAGNESIUM: CPT | Performed by: INTERNAL MEDICINE

## 2021-01-01 PROCEDURE — 93306 TTE W/DOPPLER COMPLETE: CPT

## 2021-01-01 PROCEDURE — 86140 C-REACTIVE PROTEIN: CPT | Performed by: INTERNAL MEDICINE

## 2021-01-01 RX ORDER — DEXTROSE MONOHYDRATE 25 G/50ML
25 INJECTION, SOLUTION INTRAVENOUS ONCE
Status: COMPLETED | OUTPATIENT
Start: 2021-01-01 | End: 2021-01-01

## 2021-01-01 RX ORDER — TRAZODONE HYDROCHLORIDE 50 MG/1
50 TABLET ORAL
Status: CANCELLED | OUTPATIENT
Start: 2021-01-01

## 2021-01-01 RX ORDER — ARIPIPRAZOLE 5 MG/1
15 TABLET ORAL 2 TIMES DAILY
Status: DISCONTINUED | OUTPATIENT
Start: 2021-01-01 | End: 2021-01-01 | Stop reason: HOSPADM

## 2021-01-01 RX ORDER — CLONAZEPAM 0.5 MG/1
0.5 TABLET ORAL 2 TIMES DAILY
Status: DISCONTINUED | OUTPATIENT
Start: 2021-01-01 | End: 2021-01-01

## 2021-01-01 RX ORDER — ACETAMINOPHEN 325 MG/1
650 TABLET ORAL EVERY 6 HOURS PRN
Status: CANCELLED | OUTPATIENT
Start: 2021-01-01

## 2021-01-01 RX ORDER — LORAZEPAM 0.5 MG/1
0.5 TABLET ORAL ONCE
Status: COMPLETED | OUTPATIENT
Start: 2021-01-01 | End: 2021-01-01

## 2021-01-01 RX ORDER — HYDROMORPHONE HCL/PF 1 MG/ML
0.5 SYRINGE (ML) INJECTION EVERY 4 HOURS PRN
Status: DISCONTINUED | OUTPATIENT
Start: 2021-01-01 | End: 2021-01-01 | Stop reason: HOSPADM

## 2021-01-01 RX ORDER — SCOLOPAMINE TRANSDERMAL SYSTEM 1 MG/1
1 PATCH, EXTENDED RELEASE TRANSDERMAL
Status: DISCONTINUED | OUTPATIENT
Start: 2021-01-01 | End: 2021-01-01 | Stop reason: HOSPADM

## 2021-01-01 RX ORDER — LEVOTHYROXINE SODIUM 0.03 MG/1
25 TABLET ORAL
Status: CANCELLED | OUTPATIENT
Start: 2021-01-01

## 2021-01-01 RX ORDER — POLYETHYLENE GLYCOL 3350 17 G/17G
17 POWDER, FOR SOLUTION ORAL DAILY
Status: CANCELLED | OUTPATIENT
Start: 2021-01-01

## 2021-01-01 RX ORDER — METOPROLOL SUCCINATE 25 MG/1
25 TABLET, EXTENDED RELEASE ORAL ONCE
Status: COMPLETED | OUTPATIENT
Start: 2021-01-01 | End: 2021-01-01

## 2021-01-01 RX ORDER — DIGOXIN 125 MCG
250 TABLET ORAL EVERY 6 HOURS
Status: COMPLETED | OUTPATIENT
Start: 2021-01-01 | End: 2021-01-01

## 2021-01-01 RX ORDER — AMOXICILLIN 250 MG
1 CAPSULE ORAL
Status: DISCONTINUED | OUTPATIENT
Start: 2021-01-01 | End: 2021-01-01

## 2021-01-01 RX ORDER — ASPIRIN 81 MG/1
81 TABLET, CHEWABLE ORAL DAILY
Status: CANCELLED | OUTPATIENT
Start: 2021-01-01

## 2021-01-01 RX ORDER — ACETAMINOPHEN 650 MG/1
650 SUPPOSITORY RECTAL EVERY 6 HOURS PRN
Status: DISCONTINUED | OUTPATIENT
Start: 2021-01-01 | End: 2021-01-01

## 2021-01-01 RX ORDER — ASPIRIN 81 MG/1
81 TABLET, CHEWABLE ORAL DAILY
COMMUNITY
End: 2021-01-01 | Stop reason: HOSPADM

## 2021-01-01 RX ORDER — POTASSIUM CHLORIDE 20 MEQ/1
20 TABLET, EXTENDED RELEASE ORAL DAILY
Status: DISCONTINUED | OUTPATIENT
Start: 2021-01-01 | End: 2021-01-01

## 2021-01-01 RX ORDER — ALBUMIN (HUMAN) 12.5 G/50ML
25 SOLUTION INTRAVENOUS ONCE
Status: COMPLETED | OUTPATIENT
Start: 2021-01-01 | End: 2021-01-01

## 2021-01-01 RX ORDER — METOPROLOL SUCCINATE 25 MG/1
12.5 TABLET, EXTENDED RELEASE ORAL
Status: DISCONTINUED | OUTPATIENT
Start: 2021-01-01 | End: 2021-01-01

## 2021-01-01 RX ORDER — ASPIRIN 81 MG/1
81 TABLET, CHEWABLE ORAL DAILY
Status: DISCONTINUED | OUTPATIENT
Start: 2021-01-01 | End: 2021-01-01 | Stop reason: HOSPADM

## 2021-01-01 RX ORDER — ONDANSETRON 2 MG/ML
4 INJECTION INTRAMUSCULAR; INTRAVENOUS EVERY 6 HOURS PRN
Status: CANCELLED | OUTPATIENT
Start: 2021-01-01

## 2021-01-01 RX ORDER — DIGOXIN 125 MCG
125 TABLET ORAL DAILY
Status: DISCONTINUED | OUTPATIENT
Start: 2021-01-01 | End: 2021-01-01

## 2021-01-01 RX ORDER — POTASSIUM CHLORIDE 20 MEQ/1
40 TABLET, EXTENDED RELEASE ORAL 2 TIMES DAILY
Status: COMPLETED | OUTPATIENT
Start: 2021-01-01 | End: 2021-01-01

## 2021-01-01 RX ORDER — SODIUM CHLORIDE 9 MG/ML
125 INJECTION, SOLUTION INTRAVENOUS CONTINUOUS
Status: DISCONTINUED | OUTPATIENT
Start: 2021-01-01 | End: 2021-01-01

## 2021-01-01 RX ORDER — MAGNESIUM SULFATE 1 G/100ML
1 INJECTION INTRAVENOUS ONCE
Status: COMPLETED | OUTPATIENT
Start: 2021-01-01 | End: 2021-01-01

## 2021-01-01 RX ORDER — POLYETHYLENE GLYCOL 3350 17 G/17G
17 POWDER, FOR SOLUTION ORAL DAILY
Status: DISCONTINUED | OUTPATIENT
Start: 2021-01-01 | End: 2021-01-01 | Stop reason: HOSPADM

## 2021-01-01 RX ORDER — FERROUS SULFATE 325(65) MG
325 TABLET ORAL
Status: CANCELLED | OUTPATIENT
Start: 2021-01-01

## 2021-01-01 RX ORDER — LANOLIN ALCOHOL/MO/W.PET/CERES
100 CREAM (GRAM) TOPICAL DAILY
Status: CANCELLED | OUTPATIENT
Start: 2021-01-01

## 2021-01-01 RX ORDER — MIDODRINE HYDROCHLORIDE 5 MG/1
5 TABLET ORAL
Status: DISCONTINUED | OUTPATIENT
Start: 2021-01-01 | End: 2021-01-01

## 2021-01-01 RX ORDER — ACETAMINOPHEN 325 MG/1
650 TABLET ORAL EVERY 6 HOURS PRN
Status: DISCONTINUED | OUTPATIENT
Start: 2021-01-01 | End: 2021-01-01

## 2021-01-01 RX ORDER — LEVOTHYROXINE SODIUM 0.05 MG/1
50 TABLET ORAL
Status: DISCONTINUED | OUTPATIENT
Start: 2021-01-01 | End: 2021-01-01

## 2021-01-01 RX ORDER — ONDANSETRON 2 MG/ML
4 INJECTION INTRAMUSCULAR; INTRAVENOUS EVERY 6 HOURS PRN
Status: DISCONTINUED | OUTPATIENT
Start: 2021-01-01 | End: 2021-01-01

## 2021-01-01 RX ORDER — POTASSIUM CHLORIDE 20 MEQ/1
40 TABLET, EXTENDED RELEASE ORAL ONCE
Status: COMPLETED | OUTPATIENT
Start: 2021-01-01 | End: 2021-01-01

## 2021-01-01 RX ORDER — ASPIRIN 81 MG/1
81 TABLET, CHEWABLE ORAL DAILY
Status: DISCONTINUED | OUTPATIENT
Start: 2021-01-01 | End: 2021-01-01

## 2021-01-01 RX ORDER — DEXAMETHASONE SODIUM PHOSPHATE 4 MG/ML
6 INJECTION, SOLUTION INTRA-ARTICULAR; INTRALESIONAL; INTRAMUSCULAR; INTRAVENOUS; SOFT TISSUE EVERY 24 HOURS
Status: DISCONTINUED | OUTPATIENT
Start: 2021-01-01 | End: 2021-01-01

## 2021-01-01 RX ORDER — POLYETHYLENE GLYCOL 3350 17 G/17G
17 POWDER, FOR SOLUTION ORAL DAILY
Status: DISCONTINUED | OUTPATIENT
Start: 2021-01-01 | End: 2021-01-01

## 2021-01-01 RX ORDER — POTASSIUM CHLORIDE 20 MEQ/1
20 TABLET, EXTENDED RELEASE ORAL ONCE
Status: COMPLETED | OUTPATIENT
Start: 2021-01-01 | End: 2021-01-01

## 2021-01-01 RX ORDER — FERROUS SULFATE 325(65) MG
325 TABLET ORAL
Status: DISCONTINUED | OUTPATIENT
Start: 2021-01-01 | End: 2021-01-01 | Stop reason: HOSPADM

## 2021-01-01 RX ORDER — MELATONIN
2000 DAILY
Status: DISCONTINUED | OUTPATIENT
Start: 2021-01-01 | End: 2021-01-01

## 2021-01-01 RX ORDER — ZINC SULFATE 50(220)MG
220 CAPSULE ORAL DAILY
Status: DISPENSED | OUTPATIENT
Start: 2021-01-01 | End: 2021-01-01

## 2021-01-01 RX ORDER — LANOLIN ALCOHOL/MO/W.PET/CERES
100 CREAM (GRAM) TOPICAL DAILY
Status: DISCONTINUED | OUTPATIENT
Start: 2021-01-01 | End: 2021-01-01

## 2021-01-01 RX ORDER — LEVOTHYROXINE SODIUM 0.05 MG/1
50 TABLET ORAL
Status: DISCONTINUED | OUTPATIENT
Start: 2021-01-01 | End: 2021-01-01 | Stop reason: HOSPADM

## 2021-01-01 RX ORDER — TRAZODONE HYDROCHLORIDE 50 MG/1
50 TABLET ORAL
Status: DISCONTINUED | OUTPATIENT
Start: 2021-01-01 | End: 2021-01-01

## 2021-01-01 RX ORDER — LORAZEPAM 2 MG/ML
0.5 INJECTION INTRAMUSCULAR EVERY 4 HOURS PRN
Status: DISCONTINUED | OUTPATIENT
Start: 2021-01-01 | End: 2021-01-01 | Stop reason: HOSPADM

## 2021-01-01 RX ORDER — ACETAMINOPHEN 325 MG/1
650 TABLET ORAL ONCE
Status: COMPLETED | OUTPATIENT
Start: 2021-01-01 | End: 2021-01-01

## 2021-01-01 RX ORDER — MULTIVITAMIN/IRON/FOLIC ACID 18MG-0.4MG
1 TABLET ORAL DAILY
Status: DISCONTINUED | OUTPATIENT
Start: 2021-01-01 | End: 2021-01-01

## 2021-01-01 RX ORDER — TRAZODONE HYDROCHLORIDE 50 MG/1
50 TABLET ORAL
Status: DISCONTINUED | OUTPATIENT
Start: 2021-01-01 | End: 2021-01-01 | Stop reason: HOSPADM

## 2021-01-01 RX ORDER — ARIPIPRAZOLE 5 MG/1
15 TABLET ORAL 2 TIMES DAILY
Status: DISCONTINUED | OUTPATIENT
Start: 2021-01-01 | End: 2021-01-01

## 2021-01-01 RX ORDER — LANOLIN ALCOHOL/MO/W.PET/CERES
100 CREAM (GRAM) TOPICAL DAILY
Status: DISCONTINUED | OUTPATIENT
Start: 2021-01-01 | End: 2021-01-01 | Stop reason: HOSPADM

## 2021-01-01 RX ORDER — POTASSIUM CHLORIDE 14.9 MG/ML
20 INJECTION INTRAVENOUS
Status: COMPLETED | OUTPATIENT
Start: 2021-01-01 | End: 2021-01-01

## 2021-01-01 RX ORDER — ASCORBIC ACID 500 MG
1000 TABLET ORAL EVERY 12 HOURS SCHEDULED
Status: DISPENSED | OUTPATIENT
Start: 2021-01-01 | End: 2021-01-01

## 2021-01-01 RX ORDER — CLONAZEPAM 0.5 MG/1
0.5 TABLET ORAL 2 TIMES DAILY
Status: CANCELLED | OUTPATIENT
Start: 2021-01-01

## 2021-01-01 RX ORDER — ACETAMINOPHEN 325 MG/1
650 TABLET ORAL EVERY 6 HOURS PRN
Status: DISCONTINUED | OUTPATIENT
Start: 2021-01-01 | End: 2021-01-01 | Stop reason: HOSPADM

## 2021-01-01 RX ORDER — FERROUS SULFATE 325(65) MG
325 TABLET ORAL
Status: DISCONTINUED | OUTPATIENT
Start: 2021-01-01 | End: 2021-01-01

## 2021-01-01 RX ORDER — CLONAZEPAM 0.5 MG/1
0.5 TABLET ORAL 2 TIMES DAILY
Status: DISCONTINUED | OUTPATIENT
Start: 2021-01-01 | End: 2021-01-01 | Stop reason: HOSPADM

## 2021-01-01 RX ORDER — POTASSIUM CHLORIDE 29.8 MG/ML
40 INJECTION INTRAVENOUS ONCE
Status: DISCONTINUED | OUTPATIENT
Start: 2021-01-01 | End: 2021-01-01 | Stop reason: SDUPTHER

## 2021-01-01 RX ORDER — ONDANSETRON 2 MG/ML
4 INJECTION INTRAMUSCULAR; INTRAVENOUS EVERY 6 HOURS PRN
Status: DISCONTINUED | OUTPATIENT
Start: 2021-01-01 | End: 2021-01-01 | Stop reason: HOSPADM

## 2021-01-01 RX ORDER — ACETAMINOPHEN 325 MG/1
975 TABLET ORAL EVERY 8 HOURS SCHEDULED
Status: DISCONTINUED | OUTPATIENT
Start: 2021-01-01 | End: 2021-01-01 | Stop reason: HOSPADM

## 2021-01-01 RX ORDER — LEVOTHYROXINE SODIUM 0.03 MG/1
25 TABLET ORAL
Status: DISCONTINUED | OUTPATIENT
Start: 2021-01-01 | End: 2021-01-01

## 2021-01-01 RX ORDER — ARIPIPRAZOLE 5 MG/1
15 TABLET ORAL 2 TIMES DAILY
Status: CANCELLED | OUTPATIENT
Start: 2021-01-01

## 2021-01-01 RX ORDER — METOPROLOL SUCCINATE 25 MG/1
25 TABLET, EXTENDED RELEASE ORAL
Status: DISCONTINUED | OUTPATIENT
Start: 2021-01-01 | End: 2021-01-01

## 2021-01-01 RX ORDER — LEVOTHYROXINE SODIUM 0.05 MG/1
50 TABLET ORAL
Status: CANCELLED | OUTPATIENT
Start: 2021-01-01

## 2021-01-01 RX ADMIN — Medication 100 MG: at 09:52

## 2021-01-01 RX ADMIN — HYDROCORTISONE SODIUM SUCCINATE 10 MG: 100 INJECTION, POWDER, FOR SOLUTION INTRAMUSCULAR; INTRAVENOUS at 16:02

## 2021-01-01 RX ADMIN — POLYETHYLENE GLYCOL 3350 17 G: 17 POWDER, FOR SOLUTION ORAL at 10:18

## 2021-01-01 RX ADMIN — MIDODRINE HYDROCHLORIDE 5 MG: 5 TABLET ORAL at 07:31

## 2021-01-01 RX ADMIN — LEVOTHYROXINE SODIUM 50 MCG: 50 TABLET ORAL at 05:04

## 2021-01-01 RX ADMIN — ACETAMINOPHEN 650 MG: 650 SUPPOSITORY RECTAL at 01:50

## 2021-01-01 RX ADMIN — ARIPIPRAZOLE 15 MG: 10 TABLET ORAL at 22:01

## 2021-01-01 RX ADMIN — Medication 2000 UNITS: at 09:53

## 2021-01-01 RX ADMIN — DIBASIC SODIUM PHOSPHATE, MONOBASIC POTASSIUM PHOSPHATE AND MONOBASIC SODIUM PHOSPHATE 1 TABLET: 852; 155; 130 TABLET ORAL at 11:49

## 2021-01-01 RX ADMIN — DOCUSATE SODIUM AND SENNOSIDES 1 TABLET: 8.6; 5 TABLET ORAL at 21:35

## 2021-01-01 RX ADMIN — POLYETHYLENE GLYCOL 3350 17 G: 17 POWDER, FOR SOLUTION ORAL at 13:08

## 2021-01-01 RX ADMIN — ASPIRIN 81 MG CHEWABLE TABLET 81 MG: 81 TABLET CHEWABLE at 09:30

## 2021-01-01 RX ADMIN — ENOXAPARIN SODIUM 30 MG: 30 INJECTION SUBCUTANEOUS at 10:55

## 2021-01-01 RX ADMIN — ASPIRIN 81 MG CHEWABLE TABLET 81 MG: 81 TABLET CHEWABLE at 08:13

## 2021-01-01 RX ADMIN — CLONAZEPAM 0.5 MG: 0.5 TABLET ORAL at 09:30

## 2021-01-01 RX ADMIN — DOCUSATE SODIUM AND SENNOSIDES 1 TABLET: 8.6; 5 TABLET ORAL at 23:34

## 2021-01-01 RX ADMIN — DEXAMETHASONE SODIUM PHOSPHATE 6 MG: 4 INJECTION INTRA-ARTICULAR; INTRALESIONAL; INTRAMUSCULAR; INTRAVENOUS; SOFT TISSUE at 11:31

## 2021-01-01 RX ADMIN — TRAZODONE HYDROCHLORIDE 50 MG: 50 TABLET ORAL at 23:36

## 2021-01-01 RX ADMIN — ARIPIPRAZOLE 15 MG: 10 TABLET ORAL at 21:49

## 2021-01-01 RX ADMIN — HYDROCORTISONE SODIUM SUCCINATE 10 MG: 100 INJECTION, POWDER, FOR SOLUTION INTRAMUSCULAR; INTRAVENOUS at 17:01

## 2021-01-01 RX ADMIN — Medication 2000 UNITS: at 13:05

## 2021-01-01 RX ADMIN — CLONAZEPAM 0.5 MG: 0.5 TABLET ORAL at 08:14

## 2021-01-01 RX ADMIN — HYDROCORTISONE SODIUM SUCCINATE 15 MG: 100 INJECTION, POWDER, FOR SOLUTION INTRAMUSCULAR; INTRAVENOUS at 08:14

## 2021-01-01 RX ADMIN — FERROUS SULFATE TAB 325 MG (65 MG ELEMENTAL FE) 325 MG: 325 (65 FE) TAB at 09:36

## 2021-01-01 RX ADMIN — MIDODRINE HYDROCHLORIDE 5 MG: 5 TABLET ORAL at 16:18

## 2021-01-01 RX ADMIN — ONDANSETRON 4 MG: 2 INJECTION INTRAMUSCULAR; INTRAVENOUS at 15:16

## 2021-01-01 RX ADMIN — LORAZEPAM 0.5 MG: 2 INJECTION INTRAMUSCULAR; INTRAVENOUS at 14:18

## 2021-01-01 RX ADMIN — Medication 2000 UNITS: at 10:57

## 2021-01-01 RX ADMIN — POTASSIUM CHLORIDE 20 MEQ: 14.9 INJECTION, SOLUTION INTRAVENOUS at 20:36

## 2021-01-01 RX ADMIN — DIBASIC SODIUM PHOSPHATE, MONOBASIC POTASSIUM PHOSPHATE AND MONOBASIC SODIUM PHOSPHATE 1 TABLET: 852; 155; 130 TABLET ORAL at 14:47

## 2021-01-01 RX ADMIN — THIAMINE HCL TAB 100 MG 100 MG: 100 TAB at 10:02

## 2021-01-01 RX ADMIN — ENOXAPARIN SODIUM 30 MG: 30 INJECTION SUBCUTANEOUS at 08:14

## 2021-01-01 RX ADMIN — DIBASIC SODIUM PHOSPHATE, MONOBASIC POTASSIUM PHOSPHATE AND MONOBASIC SODIUM PHOSPHATE 1 TABLET: 852; 155; 130 TABLET ORAL at 08:32

## 2021-01-01 RX ADMIN — SODIUM CHLORIDE 125 ML/HR: 0.9 INJECTION, SOLUTION INTRAVENOUS at 10:44

## 2021-01-01 RX ADMIN — DEXTROSE MONOHYDRATE 25 ML: 500 INJECTION PARENTERAL at 07:49

## 2021-01-01 RX ADMIN — DOCUSATE SODIUM AND SENNOSIDES 1 TABLET: 8.6; 5 TABLET ORAL at 21:53

## 2021-01-01 RX ADMIN — DIBASIC SODIUM PHOSPHATE, MONOBASIC POTASSIUM PHOSPHATE AND MONOBASIC SODIUM PHOSPHATE 1 TABLET: 852; 155; 130 TABLET ORAL at 17:31

## 2021-01-01 RX ADMIN — MIDODRINE HYDROCHLORIDE 5 MG: 5 TABLET ORAL at 12:17

## 2021-01-01 RX ADMIN — Medication 1 TABLET: at 17:34

## 2021-01-01 RX ADMIN — HYDROCORTISONE SODIUM SUCCINATE 25 MG: 100 INJECTION, POWDER, FOR SOLUTION INTRAMUSCULAR; INTRAVENOUS at 20:42

## 2021-01-01 RX ADMIN — ASPIRIN 81 MG CHEWABLE TABLET 81 MG: 81 TABLET CHEWABLE at 08:41

## 2021-01-01 RX ADMIN — ARIPIPRAZOLE 15 MG: 10 TABLET ORAL at 20:57

## 2021-01-01 RX ADMIN — MIDODRINE HYDROCHLORIDE 5 MG: 5 TABLET ORAL at 06:40

## 2021-01-01 RX ADMIN — MIDODRINE HYDROCHLORIDE 5 MG: 5 TABLET ORAL at 11:28

## 2021-01-01 RX ADMIN — DEXAMETHASONE SODIUM PHOSPHATE 6 MG: 4 INJECTION INTRA-ARTICULAR; INTRALESIONAL; INTRAMUSCULAR; INTRAVENOUS; SOFT TISSUE at 10:56

## 2021-01-01 RX ADMIN — ARIPIPRAZOLE 15 MG: 10 TABLET ORAL at 22:54

## 2021-01-01 RX ADMIN — ACETAMINOPHEN 650 MG: 325 TABLET ORAL at 14:30

## 2021-01-01 RX ADMIN — FERROUS SULFATE TAB 325 MG (65 MG ELEMENTAL FE) 325 MG: 325 (65 FE) TAB at 08:13

## 2021-01-01 RX ADMIN — ARIPIPRAZOLE 15 MG: 10 TABLET ORAL at 09:16

## 2021-01-01 RX ADMIN — CLONAZEPAM 0.5 MG: 0.5 TABLET ORAL at 08:47

## 2021-01-01 RX ADMIN — MIDODRINE HYDROCHLORIDE 5 MG: 5 TABLET ORAL at 17:02

## 2021-01-01 RX ADMIN — HYDROCORTISONE SODIUM SUCCINATE 15 MG: 100 INJECTION, POWDER, FOR SOLUTION INTRAMUSCULAR; INTRAVENOUS at 09:53

## 2021-01-01 RX ADMIN — DIGOXIN 125 MCG: 125 TABLET ORAL at 08:41

## 2021-01-01 RX ADMIN — LEVOTHYROXINE SODIUM 50 MCG: 50 TABLET ORAL at 05:33

## 2021-01-01 RX ADMIN — POTASSIUM CHLORIDE 20 MEQ: 1500 TABLET, EXTENDED RELEASE ORAL at 09:36

## 2021-01-01 RX ADMIN — CLONAZEPAM 0.5 MG: 0.5 TABLET ORAL at 17:34

## 2021-01-01 RX ADMIN — Medication 1 TABLET: at 08:32

## 2021-01-01 RX ADMIN — Medication 2000 UNITS: at 09:16

## 2021-01-01 RX ADMIN — LEVOTHYROXINE SODIUM 50 MCG: 50 TABLET ORAL at 05:35

## 2021-01-01 RX ADMIN — TRAZODONE HYDROCHLORIDE 50 MG: 50 TABLET ORAL at 21:29

## 2021-01-01 RX ADMIN — POLYETHYLENE GLYCOL 3350 17 G: 17 POWDER, FOR SOLUTION ORAL at 09:10

## 2021-01-01 RX ADMIN — POLYETHYLENE GLYCOL 3350 17 G: 17 POWDER, FOR SOLUTION ORAL at 09:52

## 2021-01-01 RX ADMIN — ACETAMINOPHEN 650 MG: 325 TABLET ORAL at 01:38

## 2021-01-01 RX ADMIN — LEVOTHYROXINE SODIUM 50 MCG: 50 TABLET ORAL at 06:40

## 2021-01-01 RX ADMIN — REMDESIVIR 100 MG: 100 INJECTION, POWDER, LYOPHILIZED, FOR SOLUTION INTRAVENOUS at 10:37

## 2021-01-01 RX ADMIN — DIBASIC SODIUM PHOSPHATE, MONOBASIC POTASSIUM PHOSPHATE AND MONOBASIC SODIUM PHOSPHATE 1 TABLET: 852; 155; 130 TABLET ORAL at 09:51

## 2021-01-01 RX ADMIN — HYDROCORTISONE SODIUM SUCCINATE 25 MG: 100 INJECTION, POWDER, FOR SOLUTION INTRAMUSCULAR; INTRAVENOUS at 22:36

## 2021-01-01 RX ADMIN — ACETAMINOPHEN 650 MG: 325 TABLET ORAL at 04:25

## 2021-01-01 RX ADMIN — DIBASIC SODIUM PHOSPHATE, MONOBASIC POTASSIUM PHOSPHATE AND MONOBASIC SODIUM PHOSPHATE 1 TABLET: 852; 155; 130 TABLET ORAL at 17:01

## 2021-01-01 RX ADMIN — OXYCODONE HYDROCHLORIDE AND ACETAMINOPHEN 1000 MG: 500 TABLET ORAL at 11:38

## 2021-01-01 RX ADMIN — DIBASIC SODIUM PHOSPHATE, MONOBASIC POTASSIUM PHOSPHATE AND MONOBASIC SODIUM PHOSPHATE 1 TABLET: 852; 155; 130 TABLET ORAL at 09:53

## 2021-01-01 RX ADMIN — ENOXAPARIN SODIUM 30 MG: 30 INJECTION SUBCUTANEOUS at 09:52

## 2021-01-01 RX ADMIN — ARIPIPRAZOLE 15 MG: 10 TABLET ORAL at 09:36

## 2021-01-01 RX ADMIN — ENOXAPARIN SODIUM 30 MG: 30 INJECTION SUBCUTANEOUS at 08:41

## 2021-01-01 RX ADMIN — HYDROCORTISONE SODIUM SUCCINATE 15 MG: 100 INJECTION, POWDER, FOR SOLUTION INTRAMUSCULAR; INTRAVENOUS at 11:30

## 2021-01-01 RX ADMIN — CLONAZEPAM 0.5 MG: 0.5 TABLET ORAL at 17:31

## 2021-01-01 RX ADMIN — DOCUSATE SODIUM AND SENNOSIDES 1 TABLET: 8.6; 5 TABLET ORAL at 21:59

## 2021-01-01 RX ADMIN — OXYCODONE HYDROCHLORIDE AND ACETAMINOPHEN 1000 MG: 500 TABLET ORAL at 22:01

## 2021-01-01 RX ADMIN — ACETAMINOPHEN 650 MG: 325 TABLET ORAL at 15:17

## 2021-01-01 RX ADMIN — REGADENOSON 0.4 MG: 0.08 INJECTION, SOLUTION INTRAVENOUS at 10:44

## 2021-01-01 RX ADMIN — DIBASIC SODIUM PHOSPHATE, MONOBASIC POTASSIUM PHOSPHATE AND MONOBASIC SODIUM PHOSPHATE 1 TABLET: 852; 155; 130 TABLET ORAL at 19:32

## 2021-01-01 RX ADMIN — ASPIRIN 81 MG CHEWABLE TABLET 81 MG: 81 TABLET CHEWABLE at 10:58

## 2021-01-01 RX ADMIN — MIDODRINE HYDROCHLORIDE 5 MG: 5 TABLET ORAL at 20:46

## 2021-01-01 RX ADMIN — HYDROCORTISONE SODIUM SUCCINATE 10 MG: 100 INJECTION, POWDER, FOR SOLUTION INTRAMUSCULAR; INTRAVENOUS at 16:18

## 2021-01-01 RX ADMIN — HYDROCORTISONE SODIUM SUCCINATE 25 MG: 100 INJECTION, POWDER, FOR SOLUTION INTRAMUSCULAR; INTRAVENOUS at 09:33

## 2021-01-01 RX ADMIN — POLYETHYLENE GLYCOL 3350 17 G: 17 POWDER, FOR SOLUTION ORAL at 08:38

## 2021-01-01 RX ADMIN — MIDODRINE HYDROCHLORIDE 5 MG: 5 TABLET ORAL at 14:50

## 2021-01-01 RX ADMIN — ENOXAPARIN SODIUM 30 MG: 30 INJECTION SUBCUTANEOUS at 11:43

## 2021-01-01 RX ADMIN — CLONAZEPAM 0.5 MG: 0.5 TABLET ORAL at 19:30

## 2021-01-01 RX ADMIN — CLONAZEPAM 0.5 MG: 0.5 TABLET ORAL at 17:07

## 2021-01-01 RX ADMIN — HYDROCORTISONE SODIUM SUCCINATE 25 MG: 100 INJECTION, POWDER, FOR SOLUTION INTRAMUSCULAR; INTRAVENOUS at 13:55

## 2021-01-01 RX ADMIN — THIAMINE HCL TAB 100 MG 100 MG: 100 TAB at 09:29

## 2021-01-01 RX ADMIN — TRAZODONE HYDROCHLORIDE 50 MG: 50 TABLET ORAL at 21:42

## 2021-01-01 RX ADMIN — Medication 2000 UNITS: at 09:36

## 2021-01-01 RX ADMIN — ZINC SULFATE 220 MG (50 MG) CAPSULE 220 MG: CAPSULE at 09:52

## 2021-01-01 RX ADMIN — CLONAZEPAM 0.5 MG: 0.5 TABLET ORAL at 09:11

## 2021-01-01 RX ADMIN — DIBASIC SODIUM PHOSPHATE, MONOBASIC POTASSIUM PHOSPHATE AND MONOBASIC SODIUM PHOSPHATE 1 TABLET: 852; 155; 130 TABLET ORAL at 08:41

## 2021-01-01 RX ADMIN — POLYETHYLENE GLYCOL 3350 17 G: 17 POWDER, FOR SOLUTION ORAL at 08:33

## 2021-01-01 RX ADMIN — OXYCODONE HYDROCHLORIDE AND ACETAMINOPHEN 1000 MG: 500 TABLET ORAL at 21:53

## 2021-01-01 RX ADMIN — HYDROCORTISONE SODIUM SUCCINATE 10 MG: 100 INJECTION, POWDER, FOR SOLUTION INTRAMUSCULAR; INTRAVENOUS at 17:42

## 2021-01-01 RX ADMIN — Medication 1 TABLET: at 09:12

## 2021-01-01 RX ADMIN — TRAZODONE HYDROCHLORIDE 50 MG: 50 TABLET ORAL at 21:41

## 2021-01-01 RX ADMIN — ACETAMINOPHEN 650 MG: 325 TABLET ORAL at 08:31

## 2021-01-01 RX ADMIN — CLONAZEPAM 0.5 MG: 0.5 TABLET ORAL at 17:05

## 2021-01-01 RX ADMIN — DIBASIC SODIUM PHOSPHATE, MONOBASIC POTASSIUM PHOSPHATE AND MONOBASIC SODIUM PHOSPHATE 1 TABLET: 852; 155; 130 TABLET ORAL at 17:08

## 2021-01-01 RX ADMIN — HYDROCORTISONE SODIUM SUCCINATE 15 MG: 100 INJECTION, POWDER, FOR SOLUTION INTRAMUSCULAR; INTRAVENOUS at 09:17

## 2021-01-01 RX ADMIN — POTASSIUM CHLORIDE 40 MEQ: 1500 TABLET, EXTENDED RELEASE ORAL at 19:31

## 2021-01-01 RX ADMIN — CLONAZEPAM 0.5 MG: 0.5 TABLET ORAL at 08:33

## 2021-01-01 RX ADMIN — Medication 1 TABLET: at 09:33

## 2021-01-01 RX ADMIN — CLONAZEPAM 0.5 MG: 0.5 TABLET ORAL at 19:10

## 2021-01-01 RX ADMIN — DIBASIC SODIUM PHOSPHATE, MONOBASIC POTASSIUM PHOSPHATE AND MONOBASIC SODIUM PHOSPHATE 1 TABLET: 852; 155; 130 TABLET ORAL at 12:17

## 2021-01-01 RX ADMIN — DIBASIC SODIUM PHOSPHATE, MONOBASIC POTASSIUM PHOSPHATE AND MONOBASIC SODIUM PHOSPHATE 1 TABLET: 852; 155; 130 TABLET ORAL at 19:30

## 2021-01-01 RX ADMIN — MIDODRINE HYDROCHLORIDE 5 MG: 5 TABLET ORAL at 05:51

## 2021-01-01 RX ADMIN — DIBASIC SODIUM PHOSPHATE, MONOBASIC POTASSIUM PHOSPHATE AND MONOBASIC SODIUM PHOSPHATE 1 TABLET: 852; 155; 130 TABLET ORAL at 15:53

## 2021-01-01 RX ADMIN — DIBASIC SODIUM PHOSPHATE, MONOBASIC POTASSIUM PHOSPHATE AND MONOBASIC SODIUM PHOSPHATE 1 TABLET: 852; 155; 130 TABLET ORAL at 17:34

## 2021-01-01 RX ADMIN — CLONAZEPAM 0.5 MG: 0.5 TABLET ORAL at 10:55

## 2021-01-01 RX ADMIN — METOPROLOL SUCCINATE 25 MG: 25 TABLET, EXTENDED RELEASE ORAL at 21:00

## 2021-01-01 RX ADMIN — POLYETHYLENE GLYCOL 3350 17 G: 17 POWDER, FOR SOLUTION ORAL at 09:16

## 2021-01-01 RX ADMIN — Medication 2000 UNITS: at 10:17

## 2021-01-01 RX ADMIN — OXYCODONE HYDROCHLORIDE AND ACETAMINOPHEN 1000 MG: 500 TABLET ORAL at 09:53

## 2021-01-01 RX ADMIN — Medication 100 MG: at 11:36

## 2021-01-01 RX ADMIN — DEXTROSE MONOHYDRATE 25 ML: 500 INJECTION PARENTERAL at 12:21

## 2021-01-01 RX ADMIN — DOCUSATE SODIUM AND SENNOSIDES 1 TABLET: 8.6; 5 TABLET ORAL at 21:45

## 2021-01-01 RX ADMIN — DEXAMETHASONE SODIUM PHOSPHATE 6 MG: 4 INJECTION INTRA-ARTICULAR; INTRALESIONAL; INTRAMUSCULAR; INTRAVENOUS; SOFT TISSUE at 10:32

## 2021-01-01 RX ADMIN — TRAZODONE HYDROCHLORIDE 50 MG: 50 TABLET ORAL at 21:53

## 2021-01-01 RX ADMIN — Medication 1 TABLET: at 10:02

## 2021-01-01 RX ADMIN — ENOXAPARIN SODIUM 30 MG: 30 INJECTION SUBCUTANEOUS at 09:30

## 2021-01-01 RX ADMIN — ARIPIPRAZOLE 15 MG: 10 TABLET ORAL at 08:41

## 2021-01-01 RX ADMIN — LEVOTHYROXINE SODIUM 50 MCG: 50 TABLET ORAL at 07:31

## 2021-01-01 RX ADMIN — Medication 100 MG: at 08:13

## 2021-01-01 RX ADMIN — TRAZODONE HYDROCHLORIDE 50 MG: 50 TABLET ORAL at 21:59

## 2021-01-01 RX ADMIN — OXYCODONE HYDROCHLORIDE AND ACETAMINOPHEN 1000 MG: 500 TABLET ORAL at 10:57

## 2021-01-01 RX ADMIN — OXYCODONE HYDROCHLORIDE AND ACETAMINOPHEN 1000 MG: 500 TABLET ORAL at 09:52

## 2021-01-01 RX ADMIN — POTASSIUM CHLORIDE 40 MEQ: 1500 TABLET, EXTENDED RELEASE ORAL at 11:28

## 2021-01-01 RX ADMIN — HYDROCORTISONE SODIUM SUCCINATE 10 MG: 100 INJECTION, POWDER, FOR SOLUTION INTRAMUSCULAR; INTRAVENOUS at 15:17

## 2021-01-01 RX ADMIN — ARIPIPRAZOLE 15 MG: 10 TABLET ORAL at 21:53

## 2021-01-01 RX ADMIN — TRAZODONE HYDROCHLORIDE 50 MG: 50 TABLET ORAL at 22:01

## 2021-01-01 RX ADMIN — HYDROCORTISONE SODIUM SUCCINATE 10 MG: 100 INJECTION, POWDER, FOR SOLUTION INTRAMUSCULAR; INTRAVENOUS at 16:01

## 2021-01-01 RX ADMIN — ARIPIPRAZOLE 15 MG: 5 TABLET ORAL at 22:35

## 2021-01-01 RX ADMIN — ARIPIPRAZOLE 15 MG: 10 TABLET ORAL at 08:32

## 2021-01-01 RX ADMIN — CLONAZEPAM 0.5 MG: 0.5 TABLET ORAL at 11:37

## 2021-01-01 RX ADMIN — MIDODRINE HYDROCHLORIDE 5 MG: 5 TABLET ORAL at 17:41

## 2021-01-01 RX ADMIN — ACETAMINOPHEN 650 MG: 325 TABLET ORAL at 23:35

## 2021-01-01 RX ADMIN — Medication 1 TABLET: at 08:13

## 2021-01-01 RX ADMIN — METOPROLOL SUCCINATE 25 MG: 25 TABLET, EXTENDED RELEASE ORAL at 21:35

## 2021-01-01 RX ADMIN — ACETAMINOPHEN 650 MG: 325 TABLET ORAL at 01:06

## 2021-01-01 RX ADMIN — TRAZODONE HYDROCHLORIDE 50 MG: 50 TABLET ORAL at 22:35

## 2021-01-01 RX ADMIN — TRAZODONE HYDROCHLORIDE 50 MG: 50 TABLET ORAL at 22:55

## 2021-01-01 RX ADMIN — CLONAZEPAM 0.5 MG: 0.5 TABLET ORAL at 09:34

## 2021-01-01 RX ADMIN — MIDODRINE HYDROCHLORIDE 5 MG: 5 TABLET ORAL at 11:46

## 2021-01-01 RX ADMIN — POTASSIUM CHLORIDE 40 MEQ: 1500 TABLET, EXTENDED RELEASE ORAL at 10:21

## 2021-01-01 RX ADMIN — TRAZODONE HYDROCHLORIDE 50 MG: 50 TABLET ORAL at 21:50

## 2021-01-01 RX ADMIN — METOPROLOL SUCCINATE 25 MG: 25 TABLET, EXTENDED RELEASE ORAL at 01:37

## 2021-01-01 RX ADMIN — CLONAZEPAM 0.5 MG: 0.5 TABLET ORAL at 17:45

## 2021-01-01 RX ADMIN — ARIPIPRAZOLE 15 MG: 10 TABLET ORAL at 11:36

## 2021-01-01 RX ADMIN — MIDODRINE HYDROCHLORIDE 5 MG: 5 TABLET ORAL at 07:36

## 2021-01-01 RX ADMIN — ENOXAPARIN SODIUM 30 MG: 30 INJECTION SUBCUTANEOUS at 12:37

## 2021-01-01 RX ADMIN — DIBASIC SODIUM PHOSPHATE, MONOBASIC POTASSIUM PHOSPHATE AND MONOBASIC SODIUM PHOSPHATE 1 TABLET: 852; 155; 130 TABLET ORAL at 09:33

## 2021-01-01 RX ADMIN — ARIPIPRAZOLE 15 MG: 10 TABLET ORAL at 20:34

## 2021-01-01 RX ADMIN — HYDROCORTISONE SODIUM SUCCINATE 10 MG: 100 INJECTION, POWDER, FOR SOLUTION INTRAMUSCULAR; INTRAVENOUS at 17:31

## 2021-01-01 RX ADMIN — DEXAMETHASONE SODIUM PHOSPHATE 6 MG: 4 INJECTION INTRA-ARTICULAR; INTRALESIONAL; INTRAMUSCULAR; INTRAVENOUS; SOFT TISSUE at 10:28

## 2021-01-01 RX ADMIN — ARIPIPRAZOLE 15 MG: 10 TABLET ORAL at 09:10

## 2021-01-01 RX ADMIN — ARIPIPRAZOLE 15 MG: 5 TABLET ORAL at 09:30

## 2021-01-01 RX ADMIN — ASPIRIN 81 MG CHEWABLE TABLET 81 MG: 81 TABLET CHEWABLE at 11:37

## 2021-01-01 RX ADMIN — LEVOTHYROXINE SODIUM 50 MCG: 50 TABLET ORAL at 07:36

## 2021-01-01 RX ADMIN — REMDESIVIR 200 MG: 100 INJECTION, POWDER, LYOPHILIZED, FOR SOLUTION INTRAVENOUS at 10:52

## 2021-01-01 RX ADMIN — OXYCODONE HYDROCHLORIDE AND ACETAMINOPHEN 1000 MG: 500 TABLET ORAL at 23:34

## 2021-01-01 RX ADMIN — DIBASIC SODIUM PHOSPHATE, MONOBASIC POTASSIUM PHOSPHATE AND MONOBASIC SODIUM PHOSPHATE 1 TABLET: 852; 155; 130 TABLET ORAL at 10:57

## 2021-01-01 RX ADMIN — ARIPIPRAZOLE 15 MG: 10 TABLET ORAL at 08:12

## 2021-01-01 RX ADMIN — ASPIRIN 81 MG CHEWABLE TABLET 81 MG: 81 TABLET CHEWABLE at 09:51

## 2021-01-01 RX ADMIN — ARIPIPRAZOLE 15 MG: 10 TABLET ORAL at 10:16

## 2021-01-01 RX ADMIN — MIDODRINE HYDROCHLORIDE 5 MG: 5 TABLET ORAL at 13:06

## 2021-01-01 RX ADMIN — ALBUMIN (HUMAN) 25 G: 0.25 INJECTION, SOLUTION INTRAVENOUS at 16:17

## 2021-01-01 RX ADMIN — DIBASIC SODIUM PHOSPHATE, MONOBASIC POTASSIUM PHOSPHATE AND MONOBASIC SODIUM PHOSPHATE 1 TABLET: 852; 155; 130 TABLET ORAL at 12:44

## 2021-01-01 RX ADMIN — CLONAZEPAM 0.5 MG: 0.5 TABLET ORAL at 10:17

## 2021-01-01 RX ADMIN — POLYETHYLENE GLYCOL 3350 17 G: 17 POWDER, FOR SOLUTION ORAL at 09:33

## 2021-01-01 RX ADMIN — DIBASIC SODIUM PHOSPHATE, MONOBASIC POTASSIUM PHOSPHATE AND MONOBASIC SODIUM PHOSPHATE 1 TABLET: 852; 155; 130 TABLET ORAL at 17:41

## 2021-01-01 RX ADMIN — METOPROLOL SUCCINATE 25 MG: 25 TABLET, EXTENDED RELEASE ORAL at 21:50

## 2021-01-01 RX ADMIN — CLONAZEPAM 0.5 MG: 0.5 TABLET ORAL at 18:21

## 2021-01-01 RX ADMIN — ASPIRIN 81 MG CHEWABLE TABLET 81 MG: 81 TABLET CHEWABLE at 10:03

## 2021-01-01 RX ADMIN — DOCUSATE SODIUM AND SENNOSIDES 1 TABLET: 8.6; 5 TABLET ORAL at 21:03

## 2021-01-01 RX ADMIN — HYDROCORTISONE SODIUM SUCCINATE 10 MG: 100 INJECTION, POWDER, FOR SOLUTION INTRAMUSCULAR; INTRAVENOUS at 19:31

## 2021-01-01 RX ADMIN — ASPIRIN 81 MG CHEWABLE TABLET 81 MG: 81 TABLET CHEWABLE at 09:36

## 2021-01-01 RX ADMIN — Medication 100 MG: at 10:57

## 2021-01-01 RX ADMIN — ENOXAPARIN SODIUM 30 MG: 30 INJECTION SUBCUTANEOUS at 10:04

## 2021-01-01 RX ADMIN — ARIPIPRAZOLE 15 MG: 10 TABLET ORAL at 23:34

## 2021-01-01 RX ADMIN — ARIPIPRAZOLE 15 MG: 10 TABLET ORAL at 21:03

## 2021-01-01 RX ADMIN — DIBASIC SODIUM PHOSPHATE, MONOBASIC POTASSIUM PHOSPHATE AND MONOBASIC SODIUM PHOSPHATE 1 TABLET: 852; 155; 130 TABLET ORAL at 08:39

## 2021-01-01 RX ADMIN — MIDODRINE HYDROCHLORIDE 5 MG: 5 TABLET ORAL at 16:02

## 2021-01-01 RX ADMIN — POTASSIUM CHLORIDE 40 MEQ: 1500 TABLET, EXTENDED RELEASE ORAL at 12:00

## 2021-01-01 RX ADMIN — CLONAZEPAM 0.5 MG: 0.5 TABLET ORAL at 09:36

## 2021-01-01 RX ADMIN — HYDROCORTISONE SODIUM SUCCINATE 15 MG: 100 INJECTION, POWDER, FOR SOLUTION INTRAMUSCULAR; INTRAVENOUS at 10:27

## 2021-01-01 RX ADMIN — DIGOXIN 125 MCG: 125 TABLET ORAL at 08:40

## 2021-01-01 RX ADMIN — ASPIRIN 81 MG CHEWABLE TABLET 81 MG: 81 TABLET CHEWABLE at 09:16

## 2021-01-01 RX ADMIN — ZINC SULFATE 220 MG (50 MG) CAPSULE 220 MG: CAPSULE at 11:37

## 2021-01-01 RX ADMIN — METOPROLOL SUCCINATE 25 MG: 25 TABLET, EXTENDED RELEASE ORAL at 21:53

## 2021-01-01 RX ADMIN — FERROUS SULFATE TAB 325 MG (65 MG ELEMENTAL FE) 325 MG: 325 (65 FE) TAB at 10:02

## 2021-01-01 RX ADMIN — POTASSIUM CHLORIDE 20 MEQ: 1500 TABLET, EXTENDED RELEASE ORAL at 13:07

## 2021-01-01 RX ADMIN — MIDODRINE HYDROCHLORIDE 5 MG: 5 TABLET ORAL at 19:32

## 2021-01-01 RX ADMIN — OXYCODONE HYDROCHLORIDE AND ACETAMINOPHEN 1000 MG: 500 TABLET ORAL at 22:54

## 2021-01-01 RX ADMIN — DOCUSATE SODIUM AND SENNOSIDES 1 TABLET: 8.6; 5 TABLET ORAL at 22:01

## 2021-01-01 RX ADMIN — CLONAZEPAM 0.5 MG: 0.5 TABLET ORAL at 09:52

## 2021-01-01 RX ADMIN — DIBASIC SODIUM PHOSPHATE, MONOBASIC POTASSIUM PHOSPHATE AND MONOBASIC SODIUM PHOSPHATE 1 TABLET: 852; 155; 130 TABLET ORAL at 09:36

## 2021-01-01 RX ADMIN — CLONAZEPAM 0.5 MG: 0.5 TABLET ORAL at 09:16

## 2021-01-01 RX ADMIN — Medication 2000 UNITS: at 09:52

## 2021-01-01 RX ADMIN — CLONAZEPAM 0.5 MG: 0.5 TABLET ORAL at 19:31

## 2021-01-01 RX ADMIN — ONDANSETRON 4 MG: 2 INJECTION INTRAMUSCULAR; INTRAVENOUS at 12:21

## 2021-01-01 RX ADMIN — ARIPIPRAZOLE 15 MG: 10 TABLET ORAL at 21:35

## 2021-01-01 RX ADMIN — TRAZODONE HYDROCHLORIDE 50 MG: 50 TABLET ORAL at 21:04

## 2021-01-01 RX ADMIN — FERROUS SULFATE TAB 325 MG (65 MG ELEMENTAL FE) 325 MG: 325 (65 FE) TAB at 09:52

## 2021-01-01 RX ADMIN — FERROUS SULFATE TAB 325 MG (65 MG ELEMENTAL FE) 325 MG: 325 (65 FE) TAB at 10:16

## 2021-01-01 RX ADMIN — ARIPIPRAZOLE 15 MG: 10 TABLET ORAL at 09:52

## 2021-01-01 RX ADMIN — DIGOXIN 250 MCG: 125 TABLET ORAL at 21:59

## 2021-01-01 RX ADMIN — HYDROCORTISONE SODIUM SUCCINATE 15 MG: 100 INJECTION, POWDER, FOR SOLUTION INTRAMUSCULAR; INTRAVENOUS at 09:12

## 2021-01-01 RX ADMIN — ARIPIPRAZOLE 15 MG: 10 TABLET ORAL at 09:51

## 2021-01-01 RX ADMIN — POLYETHYLENE GLYCOL 3350 17 G: 17 POWDER, FOR SOLUTION ORAL at 09:30

## 2021-01-01 RX ADMIN — Medication 1 TABLET: at 13:08

## 2021-01-01 RX ADMIN — DIBASIC SODIUM PHOSPHATE, MONOBASIC POTASSIUM PHOSPHATE AND MONOBASIC SODIUM PHOSPHATE 1 TABLET: 852; 155; 130 TABLET ORAL at 11:30

## 2021-01-01 RX ADMIN — DIBASIC SODIUM PHOSPHATE, MONOBASIC POTASSIUM PHOSPHATE AND MONOBASIC SODIUM PHOSPHATE 1 TABLET: 852; 155; 130 TABLET ORAL at 12:57

## 2021-01-01 RX ADMIN — DIBASIC SODIUM PHOSPHATE, MONOBASIC POTASSIUM PHOSPHATE AND MONOBASIC SODIUM PHOSPHATE 1 TABLET: 852; 155; 130 TABLET ORAL at 08:13

## 2021-01-01 RX ADMIN — MIDODRINE HYDROCHLORIDE 5 MG: 5 TABLET ORAL at 19:30

## 2021-01-01 RX ADMIN — LEVOTHYROXINE SODIUM 50 MCG: 50 TABLET ORAL at 06:01

## 2021-01-01 RX ADMIN — HYDROCORTISONE SODIUM SUCCINATE 25 MG: 100 INJECTION, POWDER, FOR SOLUTION INTRAMUSCULAR; INTRAVENOUS at 10:03

## 2021-01-01 RX ADMIN — MIDODRINE HYDROCHLORIDE 5 MG: 5 TABLET ORAL at 06:39

## 2021-01-01 RX ADMIN — OXYCODONE HYDROCHLORIDE AND ACETAMINOPHEN 1000 MG: 500 TABLET ORAL at 09:16

## 2021-01-01 RX ADMIN — ARIPIPRAZOLE 15 MG: 10 TABLET ORAL at 23:36

## 2021-01-01 RX ADMIN — DIGOXIN 125 MCG: 125 TABLET ORAL at 09:52

## 2021-01-01 RX ADMIN — ACETAMINOPHEN 650 MG: 325 TABLET ORAL at 03:59

## 2021-01-01 RX ADMIN — DEXAMETHASONE SODIUM PHOSPHATE 6 MG: 4 INJECTION INTRA-ARTICULAR; INTRALESIONAL; INTRAMUSCULAR; INTRAVENOUS; SOFT TISSUE at 12:21

## 2021-01-01 RX ADMIN — CLONAZEPAM 0.5 MG: 0.5 TABLET ORAL at 08:39

## 2021-01-01 RX ADMIN — DIBASIC SODIUM PHOSPHATE, MONOBASIC POTASSIUM PHOSPHATE AND MONOBASIC SODIUM PHOSPHATE 1 TABLET: 852; 155; 130 TABLET ORAL at 16:01

## 2021-01-01 RX ADMIN — POLYETHYLENE GLYCOL 3350 17 G: 17 POWDER, FOR SOLUTION ORAL at 11:39

## 2021-01-01 RX ADMIN — REMDESIVIR 100 MG: 100 INJECTION, POWDER, LYOPHILIZED, FOR SOLUTION INTRAVENOUS at 11:35

## 2021-01-01 RX ADMIN — Medication 1 TABLET: at 08:39

## 2021-01-01 RX ADMIN — CLONAZEPAM 0.5 MG: 0.5 TABLET ORAL at 20:46

## 2021-01-01 RX ADMIN — DIBASIC SODIUM PHOSPHATE, MONOBASIC POTASSIUM PHOSPHATE AND MONOBASIC SODIUM PHOSPHATE 1 TABLET: 852; 155; 130 TABLET ORAL at 12:27

## 2021-01-01 RX ADMIN — ASPIRIN 81 MG CHEWABLE TABLET 81 MG: 81 TABLET CHEWABLE at 09:34

## 2021-01-01 RX ADMIN — ARIPIPRAZOLE 15 MG: 10 TABLET ORAL at 10:57

## 2021-01-01 RX ADMIN — MIDODRINE HYDROCHLORIDE 5 MG: 5 TABLET ORAL at 09:51

## 2021-01-01 RX ADMIN — DEXTROSE MONOHYDRATE 25 ML: 500 INJECTION PARENTERAL at 17:41

## 2021-01-01 RX ADMIN — SODIUM CHLORIDE 1000 ML: 0.9 INJECTION, SOLUTION INTRAVENOUS at 09:45

## 2021-01-01 RX ADMIN — DOCUSATE SODIUM AND SENNOSIDES 1 TABLET: 8.6; 5 TABLET ORAL at 21:42

## 2021-01-01 RX ADMIN — HYDROCORTISONE SODIUM SUCCINATE 15 MG: 100 INJECTION, POWDER, FOR SOLUTION INTRAMUSCULAR; INTRAVENOUS at 08:33

## 2021-01-01 RX ADMIN — ASPIRIN 81 MG CHEWABLE TABLET 81 MG: 81 TABLET CHEWABLE at 17:26

## 2021-01-01 RX ADMIN — REMDESIVIR 100 MG: 100 INJECTION, POWDER, LYOPHILIZED, FOR SOLUTION INTRAVENOUS at 12:18

## 2021-01-01 RX ADMIN — DIBASIC SODIUM PHOSPHATE, MONOBASIC POTASSIUM PHOSPHATE AND MONOBASIC SODIUM PHOSPHATE 1 TABLET: 852; 155; 130 TABLET ORAL at 09:17

## 2021-01-01 RX ADMIN — POTASSIUM CHLORIDE 20 MEQ: 1500 TABLET, EXTENDED RELEASE ORAL at 19:30

## 2021-01-01 RX ADMIN — Medication 1 TABLET: at 09:37

## 2021-01-01 RX ADMIN — DIGOXIN 125 MCG: 125 TABLET ORAL at 08:28

## 2021-01-01 RX ADMIN — OXYCODONE HYDROCHLORIDE AND ACETAMINOPHEN 1000 MG: 500 TABLET ORAL at 20:43

## 2021-01-01 RX ADMIN — Medication 100 MG: at 08:42

## 2021-01-01 RX ADMIN — POLYETHYLENE GLYCOL 3350 17 G: 17 POWDER, FOR SOLUTION ORAL at 10:03

## 2021-01-01 RX ADMIN — DIGOXIN 250 MCG: 125 TABLET ORAL at 15:23

## 2021-01-01 RX ADMIN — ENOXAPARIN SODIUM 40 MG: 40 INJECTION SUBCUTANEOUS at 10:26

## 2021-01-01 RX ADMIN — DEXAMETHASONE SODIUM PHOSPHATE 6 MG: 4 INJECTION INTRA-ARTICULAR; INTRALESIONAL; INTRAMUSCULAR; INTRAVENOUS; SOFT TISSUE at 09:15

## 2021-01-01 RX ADMIN — ENOXAPARIN SODIUM 40 MG: 40 INJECTION SUBCUTANEOUS at 21:35

## 2021-01-01 RX ADMIN — HYDROCORTISONE SODIUM SUCCINATE 15 MG: 100 INJECTION, POWDER, FOR SOLUTION INTRAMUSCULAR; INTRAVENOUS at 09:36

## 2021-01-01 RX ADMIN — ARIPIPRAZOLE 15 MG: 5 TABLET ORAL at 20:36

## 2021-01-01 RX ADMIN — ARIPIPRAZOLE 15 MG: 10 TABLET ORAL at 21:42

## 2021-01-01 RX ADMIN — Medication 12.5 MG: at 20:34

## 2021-01-01 RX ADMIN — POLYETHYLENE GLYCOL 3350 17 G: 17 POWDER, FOR SOLUTION ORAL at 08:40

## 2021-01-01 RX ADMIN — ACETAMINOPHEN 650 MG: 325 TABLET ORAL at 11:26

## 2021-01-01 RX ADMIN — LEVOTHYROXINE SODIUM 50 MCG: 50 TABLET ORAL at 05:21

## 2021-01-01 RX ADMIN — Medication 2000 UNITS: at 11:36

## 2021-01-01 RX ADMIN — ARIPIPRAZOLE 15 MG: 10 TABLET ORAL at 09:34

## 2021-01-01 RX ADMIN — ACETAMINOPHEN 650 MG: 325 TABLET ORAL at 02:57

## 2021-01-01 RX ADMIN — CLONAZEPAM 0.5 MG: 0.5 TABLET ORAL at 00:44

## 2021-01-01 RX ADMIN — Medication 100 MG: at 10:17

## 2021-01-01 RX ADMIN — Medication 12.5 MG: at 15:53

## 2021-01-01 RX ADMIN — Medication 1 TABLET: at 17:05

## 2021-01-01 RX ADMIN — DIBASIC SODIUM PHOSPHATE, MONOBASIC POTASSIUM PHOSPHATE AND MONOBASIC SODIUM PHOSPHATE 1 TABLET: 852; 155; 130 TABLET ORAL at 16:18

## 2021-01-01 RX ADMIN — DIBASIC SODIUM PHOSPHATE, MONOBASIC POTASSIUM PHOSPHATE AND MONOBASIC SODIUM PHOSPHATE 1 TABLET: 852; 155; 130 TABLET ORAL at 19:38

## 2021-01-01 RX ADMIN — CLONAZEPAM 0.5 MG: 0.5 TABLET ORAL at 10:02

## 2021-01-01 RX ADMIN — FERROUS SULFATE TAB 325 MG (65 MG ELEMENTAL FE) 325 MG: 325 (65 FE) TAB at 07:42

## 2021-01-01 RX ADMIN — DIBASIC SODIUM PHOSPHATE, MONOBASIC POTASSIUM PHOSPHATE AND MONOBASIC SODIUM PHOSPHATE 1 TABLET: 852; 155; 130 TABLET ORAL at 14:50

## 2021-01-01 RX ADMIN — MIDODRINE HYDROCHLORIDE 5 MG: 5 TABLET ORAL at 11:49

## 2021-01-01 RX ADMIN — HYDROCORTISONE SODIUM SUCCINATE 10 MG: 100 INJECTION, POWDER, FOR SOLUTION INTRAMUSCULAR; INTRAVENOUS at 20:47

## 2021-01-01 RX ADMIN — LEVOTHYROXINE SODIUM 50 MCG: 50 TABLET ORAL at 05:42

## 2021-01-01 RX ADMIN — TRAZODONE HYDROCHLORIDE 50 MG: 50 TABLET ORAL at 21:00

## 2021-01-01 RX ADMIN — POLYETHYLENE GLYCOL 3350 17 G: 17 POWDER, FOR SOLUTION ORAL at 11:01

## 2021-01-01 RX ADMIN — ASPIRIN 81 MG CHEWABLE TABLET 81 MG: 81 TABLET CHEWABLE at 08:32

## 2021-01-01 RX ADMIN — LORAZEPAM 0.5 MG: 0.5 TABLET ORAL at 13:17

## 2021-01-01 RX ADMIN — ENOXAPARIN SODIUM 30 MG: 30 INJECTION SUBCUTANEOUS at 09:10

## 2021-01-01 RX ADMIN — LEVOTHYROXINE SODIUM 50 MCG: 50 TABLET ORAL at 05:29

## 2021-01-01 RX ADMIN — DIBASIC SODIUM PHOSPHATE, MONOBASIC POTASSIUM PHOSPHATE AND MONOBASIC SODIUM PHOSPHATE 1 TABLET: 852; 155; 130 TABLET ORAL at 13:18

## 2021-01-01 RX ADMIN — DIBASIC SODIUM PHOSPHATE, MONOBASIC POTASSIUM PHOSPHATE AND MONOBASIC SODIUM PHOSPHATE 1 TABLET: 852; 155; 130 TABLET ORAL at 11:28

## 2021-01-01 RX ADMIN — Medication 100 MG: at 13:07

## 2021-01-01 RX ADMIN — DIBASIC SODIUM PHOSPHATE, MONOBASIC POTASSIUM PHOSPHATE AND MONOBASIC SODIUM PHOSPHATE 1 TABLET: 852; 155; 130 TABLET ORAL at 19:08

## 2021-01-01 RX ADMIN — MIDODRINE HYDROCHLORIDE 5 MG: 5 TABLET ORAL at 11:30

## 2021-01-01 RX ADMIN — DIBASIC SODIUM PHOSPHATE, MONOBASIC POTASSIUM PHOSPHATE AND MONOBASIC SODIUM PHOSPHATE 1 TABLET: 852; 155; 130 TABLET ORAL at 12:41

## 2021-01-01 RX ADMIN — ASPIRIN 81 MG CHEWABLE TABLET 81 MG: 81 TABLET CHEWABLE at 08:40

## 2021-01-01 RX ADMIN — CLONAZEPAM 0.5 MG: 0.5 TABLET ORAL at 17:42

## 2021-01-01 RX ADMIN — OXYCODONE HYDROCHLORIDE AND ACETAMINOPHEN 1000 MG: 500 TABLET ORAL at 10:17

## 2021-01-01 RX ADMIN — Medication 1 TABLET: at 17:26

## 2021-01-01 RX ADMIN — Medication 1 TABLET: at 08:41

## 2021-01-01 RX ADMIN — CLONAZEPAM 0.5 MG: 0.5 TABLET ORAL at 17:01

## 2021-01-01 RX ADMIN — DIBASIC SODIUM PHOSPHATE, MONOBASIC POTASSIUM PHOSPHATE AND MONOBASIC SODIUM PHOSPHATE 1 TABLET: 852; 155; 130 TABLET ORAL at 09:12

## 2021-01-01 RX ADMIN — ARIPIPRAZOLE 15 MG: 10 TABLET ORAL at 21:41

## 2021-01-01 RX ADMIN — ENOXAPARIN SODIUM 30 MG: 30 INJECTION SUBCUTANEOUS at 08:33

## 2021-01-01 RX ADMIN — DOCUSATE SODIUM AND SENNOSIDES 1 TABLET: 8.6; 5 TABLET ORAL at 22:55

## 2021-01-01 RX ADMIN — HYDROCORTISONE SODIUM SUCCINATE 15 MG: 100 INJECTION, POWDER, FOR SOLUTION INTRAMUSCULAR; INTRAVENOUS at 08:39

## 2021-01-01 RX ADMIN — LEVOTHYROXINE SODIUM 50 MCG: 50 TABLET ORAL at 06:39

## 2021-01-01 RX ADMIN — HYDROCORTISONE SODIUM SUCCINATE 15 MG: 100 INJECTION, POWDER, FOR SOLUTION INTRAMUSCULAR; INTRAVENOUS at 10:59

## 2021-01-01 RX ADMIN — FERROUS SULFATE TAB 325 MG (65 MG ELEMENTAL FE) 325 MG: 325 (65 FE) TAB at 10:58

## 2021-01-01 RX ADMIN — MIDODRINE HYDROCHLORIDE 5 MG: 5 TABLET ORAL at 05:04

## 2021-01-01 RX ADMIN — POTASSIUM CHLORIDE 20 MEQ: 14.9 INJECTION, SOLUTION INTRAVENOUS at 22:55

## 2021-01-01 RX ADMIN — ARIPIPRAZOLE 15 MG: 5 TABLET ORAL at 10:02

## 2021-01-01 RX ADMIN — POTASSIUM CHLORIDE 40 MEQ: 1500 TABLET, EXTENDED RELEASE ORAL at 14:47

## 2021-01-01 RX ADMIN — DOCUSATE SODIUM AND SENNOSIDES 1 TABLET: 8.6; 5 TABLET ORAL at 21:00

## 2021-01-01 RX ADMIN — TRAZODONE HYDROCHLORIDE 50 MG: 50 TABLET ORAL at 21:35

## 2021-01-01 RX ADMIN — POLYETHYLENE GLYCOL 3350 17 G: 17 POWDER, FOR SOLUTION ORAL at 17:26

## 2021-01-01 RX ADMIN — POTASSIUM CHLORIDE 40 MEQ: 1500 TABLET, EXTENDED RELEASE ORAL at 11:49

## 2021-01-01 RX ADMIN — LEVOTHYROXINE SODIUM 25 MCG: 25 TABLET ORAL at 06:50

## 2021-01-01 RX ADMIN — FERROUS SULFATE TAB 325 MG (65 MG ELEMENTAL FE) 325 MG: 325 (65 FE) TAB at 11:38

## 2021-01-01 RX ADMIN — Medication 100 MG: at 08:32

## 2021-01-01 RX ADMIN — DIBASIC SODIUM PHOSPHATE, MONOBASIC POTASSIUM PHOSPHATE AND MONOBASIC SODIUM PHOSPHATE 1 TABLET: 852; 155; 130 TABLET ORAL at 11:36

## 2021-01-01 RX ADMIN — REMDESIVIR 100 MG: 100 INJECTION, POWDER, LYOPHILIZED, FOR SOLUTION INTRAVENOUS at 11:50

## 2021-01-01 RX ADMIN — ENOXAPARIN SODIUM 30 MG: 30 INJECTION SUBCUTANEOUS at 08:38

## 2021-01-01 RX ADMIN — CLONAZEPAM 0.5 MG: 0.5 TABLET ORAL at 17:41

## 2021-01-01 RX ADMIN — ASPIRIN 81 MG CHEWABLE TABLET 81 MG: 81 TABLET CHEWABLE at 09:53

## 2021-01-01 RX ADMIN — DIBASIC SODIUM PHOSPHATE, MONOBASIC POTASSIUM PHOSPHATE AND MONOBASIC SODIUM PHOSPHATE 1 TABLET: 852; 155; 130 TABLET ORAL at 10:17

## 2021-01-01 RX ADMIN — LEVOTHYROXINE SODIUM 50 MCG: 50 TABLET ORAL at 05:22

## 2021-01-01 RX ADMIN — DIBASIC SODIUM PHOSPHATE, MONOBASIC POTASSIUM PHOSPHATE AND MONOBASIC SODIUM PHOSPHATE 1 TABLET: 852; 155; 130 TABLET ORAL at 12:23

## 2021-01-01 RX ADMIN — DIBASIC SODIUM PHOSPHATE, MONOBASIC POTASSIUM PHOSPHATE AND MONOBASIC SODIUM PHOSPHATE 1 TABLET: 852; 155; 130 TABLET ORAL at 16:02

## 2021-01-01 RX ADMIN — METOPROLOL SUCCINATE 25 MG: 25 TABLET, EXTENDED RELEASE ORAL at 22:01

## 2021-01-01 RX ADMIN — Medication 100 MG: at 08:39

## 2021-01-01 RX ADMIN — MIDODRINE HYDROCHLORIDE 5 MG: 5 TABLET ORAL at 06:09

## 2021-01-01 RX ADMIN — ZINC SULFATE 220 MG (50 MG) CAPSULE 220 MG: CAPSULE at 10:17

## 2021-01-01 RX ADMIN — ASPIRIN 81 MG CHEWABLE TABLET 81 MG: 81 TABLET CHEWABLE at 10:17

## 2021-01-01 RX ADMIN — ASPIRIN 81 MG CHEWABLE TABLET 81 MG: 81 TABLET CHEWABLE at 09:12

## 2021-01-01 RX ADMIN — DEXAMETHASONE SODIUM PHOSPHATE 6 MG: 4 INJECTION INTRA-ARTICULAR; INTRALESIONAL; INTRAMUSCULAR; INTRAVENOUS; SOFT TISSUE at 09:36

## 2021-01-01 RX ADMIN — SCOPALAMINE 1 PATCH: 1 PATCH, EXTENDED RELEASE TRANSDERMAL at 13:50

## 2021-01-01 RX ADMIN — ENOXAPARIN SODIUM 30 MG: 30 INJECTION SUBCUTANEOUS at 17:26

## 2021-01-01 RX ADMIN — LEVOTHYROXINE SODIUM 25 MCG: 25 TABLET ORAL at 06:07

## 2021-01-01 RX ADMIN — MAGNESIUM SULFATE HEPTAHYDRATE 1 G: 1 INJECTION, SOLUTION INTRAVENOUS at 17:26

## 2021-01-01 RX ADMIN — WATER: 1 INJECTION INTRAVENOUS at 22:02

## 2021-01-01 RX ADMIN — MIDODRINE HYDROCHLORIDE 5 MG: 5 TABLET ORAL at 10:32

## 2021-01-01 RX ADMIN — HYDROCORTISONE SODIUM SUCCINATE 15 MG: 100 INJECTION, POWDER, FOR SOLUTION INTRAMUSCULAR; INTRAVENOUS at 08:42

## 2021-01-01 RX ADMIN — LEVOTHYROXINE SODIUM 50 MCG: 50 TABLET ORAL at 05:51

## 2021-01-01 RX ADMIN — CLONAZEPAM 0.5 MG: 0.5 TABLET ORAL at 17:09

## 2021-01-01 RX ADMIN — DIBASIC SODIUM PHOSPHATE, MONOBASIC POTASSIUM PHOSPHATE AND MONOBASIC SODIUM PHOSPHATE 1 TABLET: 852; 155; 130 TABLET ORAL at 13:06

## 2021-01-01 RX ADMIN — MIDODRINE HYDROCHLORIDE 5 MG: 5 TABLET ORAL at 05:42

## 2021-01-01 RX ADMIN — ZINC SULFATE 220 MG (50 MG) CAPSULE 220 MG: CAPSULE at 10:58

## 2021-01-01 RX ADMIN — Medication 100 MG: at 09:36

## 2021-01-01 RX ADMIN — ENOXAPARIN SODIUM 40 MG: 40 INJECTION SUBCUTANEOUS at 21:49

## 2021-01-01 RX ADMIN — ARIPIPRAZOLE 15 MG: 10 TABLET ORAL at 21:59

## 2021-01-01 RX ADMIN — TRAZODONE HYDROCHLORIDE 50 MG: 50 TABLET ORAL at 21:57

## 2021-01-01 RX ADMIN — HYDROCORTISONE SODIUM SUCCINATE 15 MG: 100 INJECTION, POWDER, FOR SOLUTION INTRAMUSCULAR; INTRAVENOUS at 10:18

## 2021-01-01 RX ADMIN — ARIPIPRAZOLE 15 MG: 10 TABLET ORAL at 08:40

## 2021-01-01 RX ADMIN — Medication 1 TABLET: at 10:03

## 2021-01-01 RX ADMIN — ENOXAPARIN SODIUM 30 MG: 30 INJECTION SUBCUTANEOUS at 12:57

## 2021-01-01 RX ADMIN — POLYETHYLENE GLYCOL 3350 17 G: 17 POWDER, FOR SOLUTION ORAL at 08:14

## 2021-01-01 RX ADMIN — LEVOTHYROXINE SODIUM 50 MCG: 50 TABLET ORAL at 06:09

## 2021-01-01 RX ADMIN — TRAZODONE HYDROCHLORIDE 50 MG: 50 TABLET ORAL at 23:34

## 2021-01-01 RX ADMIN — DIBASIC SODIUM PHOSPHATE, MONOBASIC POTASSIUM PHOSPHATE AND MONOBASIC SODIUM PHOSPHATE 1 TABLET: 852; 155; 130 TABLET ORAL at 17:24

## 2021-01-01 RX ADMIN — ENOXAPARIN SODIUM 30 MG: 30 INJECTION SUBCUTANEOUS at 10:17

## 2021-01-01 RX ADMIN — HYDROCORTISONE SODIUM SUCCINATE 25 MG: 100 INJECTION, POWDER, FOR SOLUTION INTRAMUSCULAR; INTRAVENOUS at 21:57

## 2021-01-01 RX ADMIN — ACETAMINOPHEN 650 MG: 325 TABLET ORAL at 11:47

## 2021-01-01 RX ADMIN — ASPIRIN 81 MG CHEWABLE TABLET 81 MG: 81 TABLET CHEWABLE at 13:05

## 2021-01-01 RX ADMIN — SODIUM PHOSPHATE, MONOBASIC, MONOHYDRATE 30 MMOL: 276; 142 INJECTION, SOLUTION INTRAVENOUS at 12:57

## 2021-01-01 RX ADMIN — FERROUS SULFATE TAB 325 MG (65 MG ELEMENTAL FE) 325 MG: 325 (65 FE) TAB at 08:39

## 2021-01-01 RX ADMIN — FERROUS SULFATE TAB 325 MG (65 MG ELEMENTAL FE) 325 MG: 325 (65 FE) TAB at 08:32

## 2021-01-01 RX ADMIN — OXYCODONE HYDROCHLORIDE AND ACETAMINOPHEN 1000 MG: 500 TABLET ORAL at 20:57

## 2021-02-01 PROBLEM — I21.A1 TYPE 2 MI (MYOCARDIAL INFARCTION) (HCC): Status: ACTIVE | Noted: 2021-01-01

## 2021-02-01 PROBLEM — R53.1 GENERALIZED WEAKNESS: Status: ACTIVE | Noted: 2021-01-01

## 2021-02-01 NOTE — ED PROVIDER NOTES
Disorder  History  Chief Complaint   Patient presents with    Weakness - Generalized     unable to walk  generalized weakness  fell on saturday c/o back pain and neck pain     Patient is a 28-year-old female  She has a history of psychiatric disorder  She has OCD and an eating disorder  She also has a history of panhypopituitarism  She reports compliance with her medication  Patient reports that she has been deteriorating since the spring  However, over the last week things have gotten worse  She has become exceedingly weak and has been unable to walk  She fell on Saturday  She might have hit her head  Since the fall she has been having neck and back pain  She was able to get up on her own  Today she was on the couch patient was unable to get up  She called EMS for a lift assist   She is brought to the emergency room for evaluation  She is complaining of global weakness  She is so weak she cannot walk or accomplish ADLs  She does not feel that she is losing weight  She denies any chest pain or shortness of breath  No abdominal pain, diarrhea or vomiting  Denies fever or chills  Symptoms are severe  No relieving factors  Prior to Admission Medications   Prescriptions Last Dose Informant Patient Reported? Taking?    ARIPiprazole (ABILIFY) 15 mg tablet 1/31/2021 at Unknown time  No Yes   Sig: Take 0 5 tablets (7 5 mg total) by mouth 2 (two) times a day   aspirin 81 mg chewable tablet 1/31/2021 at Unknown time  Yes Yes   Sig: Chew 81 mg daily   cloNIDine (CATAPRES) 0 1 mg tablet 1/31/2021 at Unknown time  No Yes   Sig: Take 0 5 tablets (0 05 mg total) by mouth 2 (two) times a day   clonazePAM (KlonoPIN) 0 5 mg tablet 1/31/2021 at Unknown time Self Yes Yes   Sig: Take 0 5 mg by mouth 2 (two) times a day   ferrous sulfate 325 (65 Fe) mg tablet 1/31/2021 at Unknown time  No Yes   Sig: Take 1 tablet (325 mg total) by mouth every other day   hydrocortisone (CORTEF) 5 mg tablet 1/31/2021 at Unknown time  No Yes   Sig: Take 1 tablet (5 mg total) by mouth 2 (two) times a day   levothyroxine 25 mcg tablet 2/1/2021 at Unknown time  No Yes   Sig: Take 1 tablet (25 mcg total) by mouth daily in the early morning   traZODone (DESYREL) 50 mg tablet 1/31/2021 at Unknown time  No Yes   Sig: Take 1 tablet (50 mg total) by mouth daily at bedtime      Facility-Administered Medications: None       Past Medical History:   Diagnosis Date    Anxiety     Disease of thyroid gland     History of OCD (obsessive compulsive disorder)     Hypertension     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[facility] [address] )        Past Surgical History:   Procedure Laterality Date    ABDOMINAL SURGERY      BRAIN SURGERY      HYSTERECTOMY         History reviewed  No pertinent family history  I have reviewed and agree with the history as documented  E-Cigarette/Vaping    E-Cigarette Use Never User      E-Cigarette/Vaping Substances    Nicotine No     THC No     CBD No     Flavoring No     Other No     Unknown No      Social History     Tobacco Use    Smoking status: Current Every Day Smoker     Packs/day: 0 25     Years: 20 00     Pack years: 5 00     Types: Cigarettes    Smokeless tobacco: Never Used    Tobacco comment: 1 cig  Day   Substance Use Topics    Alcohol use: Not Currently     Frequency: Never    Drug use: Never       Review of Systems   Constitutional: Negative for chills and fever  HENT: Negative for rhinorrhea and sore throat  Eyes: Negative for pain, redness and visual disturbance  Respiratory: Negative for cough and shortness of breath  Cardiovascular: Negative for chest pain and leg swelling  Gastrointestinal: Negative for abdominal pain, diarrhea and vomiting  Endocrine: Negative for polydipsia and polyuria  Genitourinary: Negative for dysuria, frequency, hematuria, vaginal bleeding and vaginal discharge  Musculoskeletal: Positive for back pain and neck pain  Skin: Negative for rash and wound  Allergic/Immunologic: Negative for immunocompromised state  Neurological: Positive for weakness  Negative for numbness and headaches  Hematological: Does not bruise/bleed easily  Psychiatric/Behavioral: Negative for hallucinations and suicidal ideas  Physical Exam  Physical Exam  Vitals signs reviewed  Constitutional:       General: She is not in acute distress  Comments: Thin and cachectic female  HENT:      Head: Normocephalic and atraumatic  Nose: Nose normal       Mouth/Throat:      Mouth: Mucous membranes are dry  Eyes:      General:         Right eye: No discharge  Left eye: No discharge  Conjunctiva/sclera: Conjunctivae normal    Neck:      Musculoskeletal: Normal range of motion and neck supple  No neck rigidity  Cardiovascular:      Rate and Rhythm: Regular rhythm  Tachycardia present  Pulses: Normal pulses  Heart sounds: Normal heart sounds  No murmur  No friction rub  No gallop  Pulmonary:      Effort: Pulmonary effort is normal  No respiratory distress  Breath sounds: Normal breath sounds  No stridor  No wheezing, rhonchi or rales  Abdominal:      General: Bowel sounds are normal  There is no distension  Palpations: Abdomen is soft  Tenderness: There is no abdominal tenderness  There is no right CVA tenderness, left CVA tenderness, guarding or rebound  Musculoskeletal: Normal range of motion  General: No swelling, tenderness, deformity or signs of injury  Right lower leg: No edema  Left lower leg: No edema  Comments: No calf tenderness or unilateral leg swelling  Skin:     General: Skin is warm and dry  Coloration: Skin is not jaundiced  Findings: No rash  Neurological:      General: No focal deficit present  Mental Status: She is alert and oriented to person, place, and time  Sensory: No sensory deficit  Motor: Motor function is intact     Psychiatric:         Mood and Affect: Mood normal          Behavior: Behavior normal          Vital Signs  ED Triage Vitals   Temperature Pulse Respirations Blood Pressure SpO2   02/01/21 0907 02/01/21 0907 02/01/21 0907 02/01/21 0915 02/01/21 0907   (!) 97 4 °F (36 3 °C) (!) 112 20 114/71 99 %      Temp src Heart Rate Source Patient Position - Orthostatic VS BP Location FiO2 (%)   -- -- -- -- --             Pain Score       02/01/21 1126       6           Vitals:    02/01/21 0930 02/01/21 1045 02/01/21 1109 02/01/21 1256   BP: 96/71 92/73 104/71 107/67   Pulse: (!) 111 (!) 116 (!) 111 (!) 107         Visual Acuity      ED Medications  Medications   sodium chloride 0 9 % infusion (125 mL/hr Intravenous New Bag 2/1/21 1044)   sodium chloride 0 9 % bolus 1,000 mL (0 mL Intravenous Stopped 2/1/21 1043)   acetaminophen (TYLENOL) tablet 650 mg (650 mg Oral Given 2/1/21 1126)       Diagnostic Studies  Results Reviewed     Procedure Component Value Units Date/Time    Basic metabolic panel [[id number]]     Lab Status: No result Specimen: Blood     Osmolality-"If this is regarding a toxic alcohol, STOP  Test is not routinely indicated  Please consult medical  on call for further guidance " [[id number]]     Lab Status: No result Specimen: Blood     Osmolality-"If this is regarding a toxic alcohol, STOP  Test is not routinely indicated   Please consult medical  on call for further guidance " [[id number]]     Lab Status: No result Specimen: Blood     Sodium, urine, random [[id number]]     Lab Status: No result Specimen: Urine     Osmolality, urine [[id number]]     Lab Status: No result Specimen: Urine     Troponin I repeat in 3hrs [[id number]]  (Abnormal) Collected: 02/01/21 1248    Lab Status: Final result Specimen: Blood from Arm, Right Updated: 02/01/21 1318     Troponin I 3 50 ng/mL     Blood culture #1 [[id number]] Collected: 02/01/21 0956    Lab Status: Preliminary result Specimen: Blood from Arm, Right Updated: 02/01/21 1301     Blood Culture Received in Microbiology Lab  Culture in Progress  Blood culture #2 [[id number]] Collected: 02/01/21 0945    Lab Status: Preliminary result Specimen: Blood from Arm, Right Updated: 02/01/21 1301     Blood Culture Received in Microbiology Lab  Culture in Progress  Lactic acid 2 Hours [[id number]]  (Normal) Collected: 02/01/21 1146    Lab Status: Final result Specimen: Blood from Arm, Right Updated: 02/01/21 1207     LACTIC ACID 1 9 mmol/L     Narrative:      Result may be elevated if tourniquet was used during collection  Urine Microscopic [[id number]]  (Abnormal) Collected: 02/01/21 1106    Lab Status: Final result Specimen: Urine, Clean Catch Updated: 02/01/21 1149     RBC, UA 4-10 /hpf      WBC, UA 2-4 /hpf      Epithelial Cells Occasional /hpf      Bacteria, UA Occasional /hpf      MUCUS THREADS Occasional    UA w Reflex to Microscopic w Reflex to Culture [[id number]]  (Abnormal) Collected: 02/01/21 1106    Lab Status: Final result Specimen: Urine, Clean Catch Updated: 02/01/21 1116     Color, UA Yellow     Clarity, UA Clear     Specific [place], UA 1 020     pH, UA 5 5     Leukocytes, UA Trace     Nitrite, UA Negative     Protein, UA Negative mg/dl      Glucose, UA Negative mg/dl      Ketones, UA 15 (1+) mg/dl      Urobilinogen, UA 0 2 E U /dl      Bilirubin, UA Negative     Blood, UA 1+    CKMB [[id number]]  (Abnormal) Collected: 02/01/21 0944    Lab Status: Final result Specimen: Blood from Arm, Right Updated: 02/01/21 1105     CK-MB Index 10 3 %      CK-MB 19 5 ng/mL     COVID19, Influenza A/B, RSV PCR, [facility] [[id number]]  (Normal) Collected: 02/01/21 0944    Lab Status: Final result Specimen: Nares from Nasopharyngeal Swab Updated: 02/01/21 1100     SARS-CoV-2 Negative     INFLUENZA A PCR Negative     INFLUENZA B PCR Negative     RSV PCR Negative    Narrative: This test has been authorized by FDA under an EUA (Emergency Use Assay) for use by authorized laboratories    Clinical caution and judgement should be used with the interpretation of these results with consideration of the clinical impression and other laboratory testing  Testing reported as "Positive" or "Negative" has been proven to be accurate according to standard laboratory validation requirements  All testing is performed with control materials showing appropriate reactivity at standard intervals  TSH [[id number]]  (Abnormal) Collected: 02/01/21 0944    Lab Status: Final result Specimen: Blood from Arm, Right Updated: 02/01/21 1053     TSH 3RD GENERATON 0 124 uIU/mL     Narrative:      Patients undergoing fluorescein dye angiography may retain small amounts of fluorescein in the body for 48-72 hours post procedure  Samples containing fluorescein can produce falsely depressed TSH values  If the patient had this procedure,a specimen should be resubmitted post fluorescein clearance  Lactic acid [[id number]]  (Abnormal) Collected: 02/01/21 0944    Lab Status: Final result Specimen: Blood from Arm, Right Updated: 02/01/21 1042     LACTIC ACID 2 1 mmol/L     Narrative:      Result may be elevated if tourniquet was used during collection      Troponin I [[id number]]  (Abnormal) Collected: 02/01/21 0944    Lab Status: Final result Specimen: Blood from Arm, Right Updated: 02/01/21 1041     Troponin I 3 64 ng/mL     Comprehensive metabolic panel [[id number]]  (Abnormal) Collected: 02/01/21 0944    Lab Status: Final result Specimen: Blood from Arm, Right Updated: 02/01/21 1041     Sodium 120 mmol/L      Potassium 3 5 mmol/L      Chloride 85 mmol/L      CO2 25 mmol/L      ANION GAP 10 mmol/L      BUN 4 mg/dL      Creatinine 0 58 mg/dL      Glucose 70 mg/dL      Calcium 9 0 mg/dL      AST 28 U/L      ALT 9 U/L      Alkaline Phosphatase 35 5 U/L      Total Protein 7 2 g/dL      Albumin 4 6 g/dL      Total Bilirubin 0 78 mg/dL      eGFR 97 ml/min/1 73sq m     Narrative:      [place] guidelines for Chronic Kidney Disease (CKD):    Stage 1 with normal or high GFR (GFR > 90 mL/min/1 73 square meters)    Stage 2 Mild CKD (GFR = 60-89 mL/min/1 73 square meters)    Stage 3A Moderate CKD (GFR = 45-59 mL/min/1 73 square meters)    Stage 3B Moderate CKD (GFR = 30-44 mL/min/1 73 square meters)    Stage 4 Severe CKD (GFR = 15-29 mL/min/1 73 square meters)    Stage 5 End Stage CKD (GFR <15 mL/min/1 73 square meters)  Note: GFR calculation is accurate only with a steady state creatinine    CK Total with Reflex CKMB [[id number]]  (Normal) Collected: 02/01/21 0944    Lab Status: Final result Specimen: Blood from Arm, Right Updated: 02/01/21 1040     Total  2 U/L     Magnesium [[id number]]  (Normal) Collected: 02/01/21 0944    Lab Status: Final result Specimen: Blood from Arm, Right Updated: 02/01/21 1040     Magnesium 1 8 mg/dL     Phosphorus [[id number]]  (Abnormal) Collected: 02/01/21 0944    Lab Status: Final result Specimen: Blood from Arm, Right Updated: 02/01/21 1040     Phosphorus 1 7 mg/dL     CBC and differential [[id number]]  (Abnormal) Collected: 02/01/21 0944    Lab Status: Final result Specimen: Blood from Arm, Right Updated: 02/01/21 1002     WBC 6 11 Thousand/uL      RBC 4 55 Million/uL      Hemoglobin 12 9 g/dL      Hematocrit 36 4 %      MCV 80 fL      MCH 28 4 pg      MCHC 35 4 g/dL      RDW 13 6 %      MPV 9 7 fL      Platelets 665 Thousands/uL      Neutrophils Relative 73 %      Immat GRANS % 0 %      Lymphocytes Relative 23 %      Monocytes Relative 3 %      Eosinophils Relative 1 %      Basophils Relative 0 %      Neutrophils Absolute 4 42 Thousands/µL      Immature Grans Absolute 0 01 Thousand/uL      Lymphocytes Absolute 1 43 Thousands/µL      Monocytes Absolute 0 20 Thousand/µL      Eosinophils Absolute 0 03 Thousand/µL      Basophils Absolute 0 02 Thousands/µL     Cortisol [[id number]] Collected: 02/01/21 0956    Lab Status:  In process Specimen: Blood from Arm, Right Updated: 02/01/21 0959                 XR chest 1 view portable   ED Interpretation by Ramírez Pena MD (02/01 1122)   No infiltrates  No CHF  Final Result by Hunter Greene MD (02/01 1049)      No acute cardiopulmonary disease  Workstation performed: [id number]         CT lumbar spine without contrast   Final Result by Monik Morse MD (02/01 1037)      No fracture or malalignment identified  Workstation performed: [id number]         CT thoracic spine without contrast   Final Result by Monik Morse MD (02/01 1037)      No fracture or malalignment identified  Workstation performed: [id number]         CT cervical spine without contrast   Final Result by Monik Morse MD (02/01 1021)      No cervical spine fracture or traumatic malalignment  Workstation performed: [id number]         CT head without contrast   Final Result by Monik Morse MD (02/01 1022)      No acute intracranial abnormality  Workstation performed: [id number]                    Procedures  ECG 12 Lead Documentation Only    Date/Time: 2/1/2021 9:42 AM  Performed by: Ramírez Pena MD  Authorized by: Ramírez Pena MD     ECG reviewed by me, the ED Provider: yes    Patient location:  ED  Comments:      Sinus tachycardia at 107 bpm with PVCs  Poor R-wave progression  Baseline artifact  No ST elevations  Abnormal EKG  ECG 12 Lead Documentation Only    Date/Time: 2/1/2021 12:46 PM  Performed by: Ramírez Pena MD  Authorized by: Ramírez Pena MD     ECG reviewed by me, the ED Provider: yes    Patient location:  ED  Comments:      Sinus tachycardia at 108 beats per minute  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 Yes Filed at: 02/01/2021 1057   Initial Alcohol Screen: US AUDIT-C    1  How often do you have a drink containing alcohol?  0 Filed at: 02/01/2021 1057   2  How many drinks containing alcohol do you have on a typical day you are drinking? 0 Filed at: 02/01/2021 1057   3a  Male UNDER 65: How often do you have five or more drinks on one occasion? 0 Filed at: 02/01/2021 1057   3b  FEMALE Any Age, or MALE 65+: How often do you have 4 or more drinks on one occassion? 0 Filed at: 02/01/2021 1057   Audit-C Score  0 Filed at: 02/01/2021 1057   LANI: How many times in the past year have you    Used an illegal drug or used a prescription medication for non-medical reasons? Never Filed at: 02/01/2021 1057                    MDM  Number of Diagnoses or Management Options  Diagnosis management comments: Patient was found to be fairly hyponatremic  This likely explains her weakness  She is malnourished  Although the lactate is mildly elevated, I do not believe patient is septic  She is afebrile  She has no symptoms of infection  Urinalysis is not consistent with UTI  Chest x-ray is without infiltrates  She is COVID negative  She did have an elevated troponin  EKG did not show any acute ischemia  Furthermore she did not experience any chest pain or shortness of breath  Delta troponin did not show any additional elevation  Repeat EKG did not show any progression to STEMI  Consulted with Cardiology  They felt that the elevation was noncardiac in origin  Recommended just trending the troponin, as long as patient did not have any cardiac symptoms  They did not recommend aspirin/heparin  Consulted with slim for admission  Patient did have neck and back pain from a fall  Imaging was negative for fracture subluxation  CT of the head was likewise negative         Amount and/or Complexity of Data Reviewed  Clinical lab tests: ordered and reviewed  Tests in the radiology section of CPT®: ordered and reviewed        Disposition  Final diagnoses:   Generalized weakness   Hyponatremia   Elevated troponin   Strain of neck muscle, initial encounter   Strain of lumbar region, initial encounter   Fall from standing, initial encounter     Time reflects when diagnosis was documented in both MDM as applicable and the Disposition within this note     Time User Action Codes Description Comment    2/1/2021  1:39 PM Zettie [place] Add [R53 1] Generalized weakness     2/1/2021  1:40 PM Zettie [place] Add [E87 1] Hyponatremia     2/1/2021  1:40 PM Zettie [place] Add [R77 8] Elevated troponin     2/1/2021  1:40 PM Zettie [place] Add Kathy Airas  1XXA] Strain of neck muscle, initial encounter     2/1/2021  1:40 PM Zettie [place] Add [F31 038X] Strain of lumbar region, initial encounter     2/1/2021  1:41 PM Zettie [place] Add [H74  XXXA] Fall from standing, initial encounter       ED Disposition     ED Disposition Condition Date/Time Comment    Admit Stable Mon Feb 1, 2021  1:39 PM       Follow-up Information    None         Patient's Medications   Discharge Prescriptions    No medications on file     No discharge procedures on file      PDMP Review       Value Time User    PDMP Reviewed  Yes 10/5/2020  9:06 AM Chase Harmon MD          ED Provider  Electronically Signed by           Linda Luna MD  02/01/21 5704

## 2021-02-01 NOTE — ASSESSMENT & PLAN NOTE
On hydrocortisone 5 mg b i d  And levothyroxine 25 mcg daily  Continue hydrocortisone IV 25 mg b i d    Continue levothyroxine  Follow-up free T4 level

## 2021-02-01 NOTE — ASSESSMENT & PLAN NOTE
Malnutrition Findings:    no subcutaneous fat, loss of cheek fat, very low BMI of 14, loss of temporal fat    Encouraged on nutritional diet

## 2021-02-01 NOTE — ASSESSMENT & PLAN NOTE
Sodium of 120 on admission  Baseline around 130  Looks dehydrated  Given 1 L in ED  Poor oral intake due to generalized weakness  Presence of tachycardia and hypotension on admission      Follow-up urine electrolytes and urine osmolality  Follow-up serum osmolarity  Follow-up BMP  Nephrology consulted, recommendation appreciated  Continue IV fluids

## 2021-02-01 NOTE — ASSESSMENT & PLAN NOTE
Elevated troponin with peak of 3 6  Type 2 MI 2/2 increased demand from hypovolemia      Follow-up echocardiogram to rule out any cardiomyopathy  Follow-up troponin

## 2021-02-01 NOTE — H&P
H&P- Sapna Olanta 1955, 72 y o  female MRN: [id number]    Unit/Bed#: -01 Encounter: [id number]    Primary Care Provider: Darryle Mango, DO   Date and time admitted to hospital: 2/1/2021  9:10 AM        * Hyponatremia  Assessment & Plan  Sodium of 120 on admission  Baseline around 130  Looks dehydrated  Given 1 L in ED  Poor oral intake due to generalized weakness  Presence of tachycardia and hypotension on admission  Follow-up urine electrolytes and urine osmolality  Follow-up serum osmolarity  Follow-up BMP  Nephrology consulted, recommendation appreciated  Continue IV fluids    Generalized weakness  Assessment & Plan  Complaining of generalized weakness since couple of months worsening since last 7 days  Compliant with steroid and thyroid medication  However etiology may be 2/2 low hormone level vs hyponatremia  See plan for hyponatremia    Type 2 MI (myocardial infarction) [facility])  Assessment & Plan  Elevated troponin with peak of 3 6  Type 2 MI 2/2 increased demand from hypovolemia  Follow-up echocardiogram to rule out any cardiomyopathy  Follow-up troponin    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HCC)  Assessment & Plan  On hydrocortisone 5 mg b i d  And levothyroxine 25 mcg daily  Continue hydrocortisone IV 25 mg b i d  Continue levothyroxine  Follow-up free T4 level    Thyroid disorder  Assessment & Plan  Continue levothyroxine    Severe protein-calorie malnutrition (HCC)  Assessment & Plan  Malnutrition Findings:    no subcutaneous fat, loss of cheek fat, very low BMI of 14, loss of temporal fat  Encouraged on nutritional diet    Suicide attempt [facility])  Assessment & Plan  H/o suicide attempt  Recently discharged from inpatient psych in October 2020  Anorexia nervosa with significantly low body weight  Assessment & Plan  H/o anorexia nervosa  Low BMI      Follow-up psychiatry, recommendation appreciated    FTT (failure to thrive) in adult  Assessment & Plan  PT OT  CM, may need placement    Emergency Contacts: Extended Emergency Contact Information  Primary Emergency Contact: [address]  Phone: [phone]  Work Phone: [phone]  Relation: None      VTE Prophylaxis: Enoxaparin (Lovenox)  / sequential compression device and foot pump applied   Code Status: DNR/DNI  POLST: There is no POLST form on file for this patient (pre-hospital)    Discussion with family: none     Anticipated Length of Stay:  Patient will be admitted on an Inpatient basis with an anticipated length of stay of  > 2 midnights  Justification for Hospital Stay: Hyponatremia and genralized weakness    Total Time for Visit, including Counseling / Coordination of Care: 45 minutes  Greater than 50% of this total time spent on direct patient counseling and coordination of care  Chief Complaint:   Generalized weakness since couple of months worsening since last 7 days    History of Present Illness:    Yahaira Chavis is a 72 y o  female with PMH of anorexia nervosa, PTSD, suicide attempt, panhypopituitarism s/p adenoma removal who presents with generalized weakness since couple of months worsening since last 7 days  H/o hypopituitarism  States being compliant with steroids and thyroid medication  Had a mechanical fall 2 days ago from generalized weakness  Denied SOB, chest pain, palpitations  No abdominal pain, fever, diarrhea  No urinary symptoms  States having decreased oral did take due to difficulty swallowing and chewing due to generalized weakness  Having very low BMI  No confusion, headache  Alert oriented times 3  Sodium 120  CBC unremarkable  All trauma workup-ve for any fracture  Given 1 L IV fluid ED  Troponin x2 elevated with peak of 3 6  EKG showing no changes  Review of Systems:    Review of Systems   Constitutional: Positive for appetite change  Negative for activity change, chills, diaphoresis, fatigue, fever and unexpected weight change     HENT: Negative for rhinorrhea and sore throat  Eyes: Negative for visual disturbance  Respiratory: Negative for cough, chest tightness and shortness of breath  Cardiovascular: Negative for chest pain, palpitations and leg swelling  Gastrointestinal: Negative for abdominal distention, abdominal pain, blood in stool, constipation, diarrhea, nausea and vomiting  Genitourinary: Negative for difficulty urinating  Musculoskeletal: Positive for arthralgias, back pain and neck pain  Neurological: Positive for dizziness and weakness  Negative for headaches  Psychiatric/Behavioral: Negative for behavioral problems and sleep disturbance  Past Medical and Surgical History:     Past Medical History:   Diagnosis Date    Anxiety     Disease of thyroid gland     History of OCD (obsessive compulsive disorder)     Hypertension     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[facility] [address] )        Past Surgical History:   Procedure Laterality Date    ABDOMINAL SURGERY      BRAIN SURGERY      HYSTERECTOMY         Meds/Allergies:    Prior to Admission medications    Medication Sig Start Date End Date Taking?  Authorizing Provider   ARIPiprazole (ABILIFY) 15 mg tablet Take 0 5 tablets (7 5 mg total) by mouth 2 (two) times a day 10/5/20 2/1/21 Yes Lincoln Ruiz MD   aspirin 81 mg chewable tablet Chew 81 mg daily   Yes Historical Provider, MD   clonazePAM (KlonoPIN) 0 5 mg tablet Take 0 5 mg by mouth 2 (two) times a day   Yes Historical Provider, MD   cloNIDine (CATAPRES) 0 1 mg tablet Take 0 5 tablets (0 05 mg total) by mouth 2 (two) times a day 10/5/20  Yes Lincoln Ruiz MD   ferrous sulfate 325 (65 Fe) mg tablet Take 1 tablet (325 mg total) by mouth every other day 10/6/20  Yes Lincoln Ruiz MD   hydrocortisone (CORTEF) 5 mg tablet Take 1 tablet (5 mg total) by mouth 2 (two) times a day 10/5/20  Yes Lincoln Ruiz MD   levothyroxine 25 mcg tablet Take 1 tablet (25 mcg total) by mouth daily in the early morning 10/6/20  Yes Lincoln Ruiz MD traZODone (DESYREL) 50 mg tablet Take 1 tablet (50 mg total) by mouth daily at bedtime 10/5/20  Yes Mayank Vega MD     I have reviewed home medications with patient personally  Allergies: Allergies   Allergen Reactions    Codeine     Penicillins     Sulfa Antibiotics        Social History:     Marital Status: Single   Occupation: retired   Patient Pre-hospital Living Situation: independent  Patient Pre-hospital Level of Mobility:   Patient Pre-hospital Diet Restrictions: diagnosed Anorexia Nervosa  Substance Use History:   Social History     Substance and Sexual Activity   Alcohol Use Not Currently    Frequency: Never     Social History     Tobacco Use   Smoking Status Current Every Day Smoker    Packs/day: 0 25    Years: 20 00    Pack years: 5 00    Types: Cigarettes   Smokeless Tobacco Never Used   Tobacco Comment    1 cig  Day     Social History     Substance and Sexual Activity   Drug Use Never       Family History:    History reviewed  No pertinent family history  Physical Exam:     Vitals:   Blood Pressure: 94/58 (02/01/21 1536)  Pulse: 103 (02/01/21 1300)  Temperature: 98 8 °F (37 1 °C) (02/01/21 1300)  Temp Source: Tympanic (02/01/21 1300)  Respirations: 18 (02/01/21 1300)  Weight - Scale: 31 8 kg (70 lb) (02/01/21 0907)  SpO2: 98 % (02/01/21 1300)    Physical Exam  Vitals signs reviewed  Constitutional:       General: She is not in acute distress  Appearance: She is well-developed  She is ill-appearing  Comments: cachetic   HENT:      Head: Normocephalic and atraumatic  Cardiovascular:      Rate and Rhythm: Normal rate and regular rhythm  Pulses: Normal pulses  Heart sounds: Normal heart sounds  Pulmonary:      Effort: Pulmonary effort is normal       Breath sounds: Normal breath sounds  Chest:      Chest wall: No tenderness  Abdominal:      General: Bowel sounds are normal  There is no distension  Palpations: Abdomen is soft  Tenderness:  There is no abdominal tenderness  Musculoskeletal:      Right lower leg: No edema  Left lower leg: No edema  Skin:     General: Skin is warm  Coloration: Skin is not pale  Neurological:      General: No focal deficit present  Mental Status: She is alert and oriented to person, place, and time  Mental status is at baseline  Cranial Nerves: No cranial nerve deficit  Motor: Weakness (generalized) present  Psychiatric:         Mood and Affect: Mood normal          Behavior: Behavior normal        Additional Data:     Lab Results: I have personally reviewed pertinent reports  Results from last 7 days   Lab Units 02/01/21  0944   WBC Thousand/uL 6 11   HEMOGLOBIN g/dL 12 9   HEMATOCRIT % 36 4   PLATELETS Thousands/uL 254   NEUTROS PCT % 73   LYMPHS PCT % 23   MONOS PCT % 3*   EOS PCT % 1     Results from last 7 days   Lab Units 02/01/21  0944   SODIUM mmol/L 120*   POTASSIUM mmol/L 3 5   CHLORIDE mmol/L 85*   CO2 mmol/L 25   BUN mg/dL 4*   CREATININE mg/dL 0 58   ANION GAP mmol/L 10   CALCIUM mg/dL 9 0   ALBUMIN g/dL 4 6   TOTAL BILIRUBIN mg/dL 0 78   ALK PHOS U/L 35 5   ALT U/L 9   AST U/L 28   GLUCOSE RANDOM mg/dL 70                 Results from last 7 days   Lab Units 02/01/21  1146 02/01/21  0944   LACTIC ACID mmol/L 1 9 2 1*       Imaging: I have personally reviewed pertinent reports  XR chest 1 view portable   ED Interpretation by Alma Hernández MD (02/01 1122)   No infiltrates  No CHF  Final Result by Carver Gowers, MD (02/01 1049)      No acute cardiopulmonary disease  Workstation performed: [id number]         CT lumbar spine without contrast   Final Result by Sweta Warren MD (02/01 1037)      No fracture or malalignment identified  Workstation performed: [id number]         CT thoracic spine without contrast   Final Result by Sweta Warren MD (02/01 1037)      No fracture or malalignment identified                 Workstation performed: [id number]         CT cervical spine without contrast   Final Result by Shaylee Love MD (02/01 1021)      No cervical spine fracture or traumatic malalignment  Workstation performed: [id number]         CT head without contrast   Final Result by Shaylee Love MD (02/01 1022)      No acute intracranial abnormality  Workstation performed: [id number]             EKG, Pathology, and Other Studies Reviewed on Admission:   · EKG: no significant changes    Allscripts / Epic Records Reviewed: Yes     ** Please Note: This note has been constructed using a voice recognition system   **

## 2021-02-01 NOTE — PLAN OF CARE
Problem: Potential for Falls  Goal: Patient will remain free of falls  Description: INTERVENTIONS:  - Assess patient frequently for physical needs  -  Identify cognitive and physical deficits and behaviors that affect risk of falls    -  [place] fall precautions as indicated by assessment   - Educate patient/family on patient safety including physical limitations  - Instruct patient to call for assistance with activity based on assessment  - Modify environment to reduce risk of injury  - Consider OT/PT consult to assist with strengthening/mobility  Outcome: Progressing     Problem: PAIN - ADULT  Goal: Verbalizes/displays adequate comfort level or baseline comfort level  Description: Interventions:  - Encourage patient to monitor pain and request assistance  - Assess pain using appropriate pain scale  - Administer analgesics based on type and severity of pain and evaluate response  - Implement non-pharmacological measures as appropriate and evaluate response  - Consider cultural and social influences on pain and pain management  - Notify physician/advanced practitioner if interventions unsuccessful or patient reports new pain  Outcome: Progressing     Problem: INFECTION - ADULT  Goal: Absence or prevention of progression during hospitalization  Description: INTERVENTIONS:  - Assess and monitor for signs and symptoms of infection  - Monitor lab/diagnostic results  - Monitor all insertion sites, i e  indwelling lines, tubes, and drains  - Monitor endotracheal if appropriate and nasal secretions for changes in amount and color  - [place] appropriate cooling/warming therapies per order  - Administer medications as ordered  - Instruct and encourage patient and family to use good hand hygiene technique  - Identify and instruct in appropriate isolation precautions for identified infection/condition  Outcome: Progressing     Problem: SAFETY ADULT  Goal: Patient will remain free of falls  Description: INTERVENTIONS:  - Assess patient frequently for physical needs  -  Identify cognitive and physical deficits and behaviors that affect risk of falls    -  [place] fall precautions as indicated by assessment   - Educate patient/family on patient safety including physical limitations  - Instruct patient to call for assistance with activity based on assessment  - Modify environment to reduce risk of injury  - Consider OT/PT consult to assist with strengthening/mobility  Outcome: Progressing  Goal: Maintain or return to baseline ADL function  Description: INTERVENTIONS:  -  Assess patient's ability to carry out ADLs; assess patient's baseline for ADL function and identify physical deficits which impact ability to perform ADLs (bathing, care of mouth/teeth, toileting, grooming, dressing, etc )  - Assess/evaluate cause of self-care deficits   - Assess range of motion  - Assess patient's mobility; develop plan if impaired  - Assess patient's need for assistive devices and provide as appropriate  - Encourage maximum independence but intervene and supervise when necessary  - Involve family in performance of ADLs  - Assess for home care needs following discharge   - Consider OT consult to assist with ADL evaluation and planning for discharge  - Provide patient education as appropriate  Outcome: Progressing  Goal: Maintain or return mobility status to optimal level  Description: INTERVENTIONS:  - Assess patient's baseline mobility status (ambulation, transfers, stairs, etc )    - Identify cognitive and physical deficits and behaviors that affect mobility  - Identify mobility aids required to assist with transfers and/or ambulation (gait belt, sit-to-stand, lift, walker, cane, etc )  - [place] fall precautions as indicated by assessment  - Record patient progress and toleration of activity level on Mobility SBAR; progress patient to next Phase/Stage  - Instruct patient to call for assistance with activity based on assessment  - Consider rehabilitation consult to assist with strengthening/weightbearing, etc   Outcome: Progressing     Problem: DISCHARGE PLANNING  Goal: Discharge to home or other facility with appropriate resources  Description: INTERVENTIONS:  - Identify barriers to discharge w/patient and caregiver  - Arrange for needed discharge resources and transportation as appropriate  - Identify discharge learning needs (meds, wound care, etc )  - Arrange for interpretive services to assist at discharge as needed  - Refer to Case Management Department for coordinating discharge planning if the patient needs post-hospital services based on physician/advanced practitioner order or complex needs related to functional status, cognitive ability, or social support system  Outcome: Progressing     Problem: Knowledge Deficit  Goal: Patient/family/caregiver demonstrates understanding of disease process, treatment plan, medications, and discharge instructions  Description: Complete learning assessment and assess knowledge base  Interventions:  - Provide teaching at level of understanding  - Provide teaching via preferred learning methods  Outcome: Progressing     Problem: Nutrition/Hydration-ADULT  Goal: Nutrient/Hydration intake appropriate for improving, restoring or maintaining nutritional needs  Description: Monitor and assess patient's nutrition/hydration status for malnutrition  Collaborate with interdisciplinary team and initiate plan and interventions as ordered  Monitor patient's weight and dietary intake as ordered or per policy  Utilize nutrition screening tool and intervene as necessary  Determine patient's food preferences and provide high-protein, high-caloric foods as appropriate       INTERVENTIONS:  - Monitor oral intake, urinary output, labs, and treatment plans  - Assess nutrition and hydration status and recommend course of action  - Evaluate amount of meals eaten  - Assist patient with eating if necessary   - Allow adequate time for meals  - Recommend/ encourage appropriate diets, oral nutritional supplements, and vitamin/mineral supplements  - Order, calculate, and assess calorie counts as needed  - Recommend, monitor, and adjust tube feedings and TPN/PPN based on assessed needs  - Assess need for intravenous fluids  - Provide specific nutrition/hydration education as appropriate  - Include patient/family/caregiver in decisions related to nutrition  Outcome: Progressing     Problem: NEUROSENSORY - ADULT  Goal: Achieves stable or improved neurological status  Description: INTERVENTIONS  - Monitor and report changes in neurological status  - Monitor vital signs such as temperature, blood pressure, glucose, and any other labs ordered   - Initiate measures to prevent increased intracranial pressure  - Monitor for seizure activity and implement precautions if appropriate      Outcome: Progressing     Problem: CARDIOVASCULAR - ADULT  Goal: Maintains optimal cardiac output and hemodynamic stability  Description: INTERVENTIONS:  - Monitor I/O, vital signs and rhythm  - Monitor for S/S and trends of decreased cardiac output  - Administer and titrate ordered vasoactive medications to optimize hemodynamic stability  - Assess quality of pulses, skin color and temperature  - Assess for signs of decreased coronary artery perfusion  - Instruct patient to report change in severity of symptoms  Outcome: Progressing  Goal: Absence of cardiac dysrhythmias or at baseline rhythm  Description: INTERVENTIONS:  - Continuous cardiac monitoring, vital signs, obtain 12 lead EKG if ordered  - Administer antiarrhythmic and heart rate control medications as ordered  - Monitor electrolytes and administer replacement therapy as ordered  Outcome: Progressing     Problem: RESPIRATORY - ADULT  Goal: Achieves optimal ventilation and oxygenation  Description: INTERVENTIONS:  - Assess for changes in respiratory status  - Assess for changes in mentation and behavior  - Position to facilitate oxygenation and minimize respiratory effort  - Oxygen administered by appropriate delivery if ordered  - Initiate smoking cessation education as indicated  - Encourage broncho-pulmonary hygiene including cough, deep breathe, Incentive Spirometry  - Assess the need for suctioning and aspirate as needed  - Assess and instruct to report SOB or any respiratory difficulty  - Respiratory Therapy support as indicated  Outcome: Progressing     Problem: GASTROINTESTINAL - ADULT  Goal: Minimal or absence of nausea and/or vomiting  Description: INTERVENTIONS:  - Administer IV fluids if ordered to ensure adequate hydration  - Maintain NPO status until nausea and vomiting are resolved  - Nasogastric tube if ordered  - Administer ordered antiemetic medications as needed  - Provide nonpharmacologic comfort measures as appropriate  - Advance diet as tolerated, if ordered  - Consider nutrition services referral to assist patient with adequate nutrition and appropriate food choices  Outcome: Progressing  Goal: Maintains adequate nutritional intake  Description: INTERVENTIONS:  - Monitor percentage of each meal consumed  - Identify factors contributing to decreased intake, treat as appropriate  - Assist with meals as needed  - Monitor I&O, weight, and lab values if indicated  - Obtain nutrition services referral as needed  Outcome: Progressing     Problem: GENITOURINARY - ADULT  Goal: Maintains or returns to baseline urinary function  Description: INTERVENTIONS:  - Assess urinary function  - Encourage oral fluids to ensure adequate hydration if ordered  - Administer IV fluids as ordered to ensure adequate hydration  - Administer ordered medications as needed  - Offer frequent toileting  - Follow urinary retention protocol if ordered  Outcome: Progressing  Goal: Absence of urinary retention  Description: INTERVENTIONS:  - Assess patients ability to void and empty bladder  - Monitor I/O  - Bladder scan as needed  - Discuss with physician/AP medications to alleviate retention as needed  - Discuss catheterization for long term situations as appropriate  Outcome: Progressing     Problem: METABOLIC, FLUID AND ELECTROLYTES - ADULT  Goal: Electrolytes maintained within normal limits  Description: INTERVENTIONS:  - Monitor labs and assess patient for signs and symptoms of electrolyte imbalances  - Administer electrolyte replacement as ordered  - Monitor response to electrolyte replacements, including repeat lab results as appropriate  - Instruct patient on fluid and nutrition as appropriate  Outcome: Progressing  Goal: Fluid balance maintained  Description: INTERVENTIONS:  - Monitor labs   - Monitor I/O and WT  - Instruct patient on fluid and nutrition as appropriate  - Assess for signs & symptoms of volume excess or deficit  Outcome: Progressing     Problem: SKIN/TISSUE INTEGRITY - ADULT  Goal: Skin integrity remains intact  Description: INTERVENTIONS  - Identify patients at risk for skin breakdown  - Assess and monitor skin integrity  - Assess and monitor nutrition and hydration status  - Monitor labs (i e  albumin)  - Assess for incontinence   - Turn and reposition patient  - Assist with mobility/ambulation  - Relieve pressure over bony prominences  - Avoid friction and shearing  - Provide appropriate hygiene as needed including keeping skin clean and dry  - Evaluate need for skin moisturizer/barrier cream  - Collaborate with interdisciplinary team (i e  Nutrition, Rehabilitation, etc )   - Patient/family teaching  Outcome: Progressing     Problem: HEMATOLOGIC - ADULT  Goal: Maintains hematologic stability  Description: INTERVENTIONS  - Assess for signs and symptoms of bleeding or hemorrhage  - Monitor labs  - Administer supportive blood products/factors as ordered and appropriate  Outcome: Progressing     Problem: MUSCULOSKELETAL - ADULT  Goal: Maintain or return mobility to safest level of function  Description: INTERVENTIONS:  - Assess patient's ability to carry out ADLs; assess patient's baseline for ADL function and identify physical deficits which impact ability to perform ADLs (bathing, care of mouth/teeth, toileting, grooming, dressing, etc )  - Assess/evaluate cause of self-care deficits   - Assess range of motion  - Assess patient's mobility  - Assess patient's need for assistive devices and provide as appropriate  - Encourage maximum independence but intervene and supervise when necessary  - Involve family in performance of ADLs  - Assess for home care needs following discharge   - Consider OT consult to assist with ADL evaluation and planning for discharge  - Provide patient education as appropriate  Outcome: Progressing     Problem: DISCHARGE PLANNING - CARE MANAGEMENT  Goal: Discharge to post-acute care or home with appropriate resources  Description: INTERVENTIONS:  - Conduct assessment to determine patient/family and health care team treatment goals, and need for post-acute services based on payer coverage, community resources, and patient preferences, and barriers to discharge  - Address psychosocial, clinical, and financial barriers to discharge as identified in assessment in conjunction with the patient/family and health care team  - Arrange appropriate level of post-acute services according to patients   needs and preference and payer coverage in collaboration with the physician and health care team  - Communicate with and update the patient/family, physician, and health care team regarding progress on the discharge plan  - Arrange appropriate transportation to post-acute venues  Outcome: Progressing

## 2021-02-01 NOTE — ASSESSMENT & PLAN NOTE
Complaining of generalized weakness since couple of months worsening since last 7 days  Compliant with steroid and thyroid medication  However etiology may be 2/2 low hormone level vs hyponatremia      See plan for hyponatremia

## 2021-02-01 NOTE — ED NOTES
Critical Lactic level 2 1  MD and primary nurse made aware       [address] Sulema Harris, RN  02/01/21 8977

## 2021-02-02 NOTE — ASSESSMENT & PLAN NOTE
Sodium of 120 on admission  Baseline around 130  Initially treated with normal saline changed to 1 8 normal saline  Poor oral intake due to generalized weakness  Tachycardia and hypotensive on presentation  Low serum osmolality and high urine osmolality with IV showed infiltration pointing more towards SIADH  Follow-up BMP in p m  And in a m    Continue 1 8 normal saline 40 cc/hour  Nephrology consulted, recommendation appreciated

## 2021-02-02 NOTE — QUICK NOTE
Pharmacist informs that 1 8% NS is not in inventory here and has to be made at [facility] and sent over

## 2021-02-02 NOTE — ASSESSMENT & PLAN NOTE
On hydrocortisone 5 mg b i d  And levothyroxine 25 mcg daily  Normal free T4 and cortisol level  Continue hydrocortisone IV 25 mg b i d    Continue levothyroxine

## 2021-02-02 NOTE — TREATMENT PLAN
Consulted for hyponatremia  case discussed with on call resident  Pt has h/o hyponatremia  Now with acute on chronic hyponatremia  Sodium of 119meq  Will start patient on 1 8% saline at 30cc/hr   Check BMP Q4 with calling parameters   Call if na>127 or <119 meq  Full consult to follow in am

## 2021-02-02 NOTE — QUICK NOTE
Repeat Na was 119  Case discussed with on call nephrologist who recommended starting 1 8 % NS at 30cc /h, BMP Q4H  If sodium is > 127 or < 118 mEq before AM please update the on call nephrologist      Night residents aware that labs need to be followed and of the above plan

## 2021-02-02 NOTE — CONSULTS
2           Consultation - Nephrology   Vikki Johnson 72 y o  female MRN: [id number]  Unit/Bed#: -01 Encounter: [id number]      Assessment/Plan     Assessment / Plan:  1  Hyponatremia    The patient has history of hyponatremia in talking to her she recalls that it tends to run around the concentration of 130  She presented with severely low sodium concentration below her baseline  Urine studies are suggestive of SIADH although she has other factors such is poor p o  Intake and self admitted anorexia  This could lead to volume depletion and low solute intake which could contribute  Again the urine studies did not suggest this on this admission  She was given normal saline initially and sodium concentration worsened then placed on 1 8% saline and is improving  Given that improved on the higher concentration of fluids does not rule out SIADH because the osmolality of this fluid is higher than the osmolality of the urine so it will take more urine volume to excrete the solute load and she receives in IV fluids with a net effective excreting free water  She is on trazodone which can cause SIADH but given her psychiatric condition I would not discontinue this unless the patient in psychiatry agree and they will be consulted  Continue 1 8% saline 40 cc/hour  Monitor BMP and sodium concentration serially  Consider discontinuation of trazodone but leave it up to the discretion of psychiatry    2  History of hypopituitarism  This apparently occurred after adenoma removal in the past she is on hormonal replacement therapy  I do not think this is playing a role in her hyponatremia  History of Present Illness   Physician Requesting Consult: Jaz Hawley MD  Reason for Consult / Principal Problem:  Hyponatremia  Hx and PE limited by:   HPI: Vikki Johnson is a 72y o  year old female who presents to the hospital complaining of generalized weakness    She says this is been worsening over few months and then for the last 4 days she really could even get off the couch  She has been progressively getting weaker and admits that she has anorexia and does not eat much  On admission she was found to have significant hyponatremia were asked to see her regarding this  History obtained from chart review and the patient  Consults    Review of Systems   Constitutional: Positive for fatigue  Negative for chills, diaphoresis and fever  HENT: Negative  Eyes: Negative  Respiratory: Negative  Negative for cough, chest tightness, shortness of breath and wheezing  Cardiovascular: Negative  Negative for chest pain, palpitations and leg swelling  Gastrointestinal: Negative  Negative for abdominal distention, abdominal pain, diarrhea, nausea and vomiting  Genitourinary: Negative for difficulty urinating, dysuria, flank pain and hematuria  Neurological: Negative for dizziness, syncope, light-headedness and headaches  Psychiatric/Behavioral: Negative for agitation, behavioral problems, confusion and decreased concentration          I have an eating disorder       Historical Information   Patient Active Problem List   Diagnosis    Hyponatremia    FTT (failure to thrive) in adult    Anorexia nervosa with significantly low body weight    Severe episode of recurrent major depressive disorder, without psychotic features ([facility] )    Suicide attempt ([facility] )    Constipation    Severe protein-calorie malnutrition ([facility]    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[facility]    Thyroid disorder    Anemia    PTSD (post-traumatic stress disorder)    Panic disorder without agoraphobia    Medical clearance for psychiatric admission    Hypertension    Alcohol use disorder in remission ([facility]    Type 2 MI (myocardial infarction) ([facility]    Generalized weakness     Past Medical History:   Diagnosis Date    Anxiety     Disease of thyroid gland     History of OCD (obsessive compulsive disorder)     Hypertension     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 )      Past Surgical History:   Procedure Laterality Date    ABDOMINAL SURGERY      BRAIN SURGERY      HYSTERECTOMY       Social History   Social History     Substance and Sexual Activity   Alcohol Use Not Currently    Frequency: Never     Social History     Substance and Sexual Activity   Drug Use Never     Social History     Tobacco Use   Smoking Status Current Every Day Smoker    Packs/day: 0 25    Years: 20 00    Pack years: 5 00    Types: Cigarettes   Smokeless Tobacco Never Used   Tobacco Comment    1 cig  Day     History reviewed  No pertinent family history  Meds/Allergies   current meds:   Current Facility-Administered Medications   Medication Dose Route Frequency    acetaminophen (TYLENOL) tablet 650 mg  650 mg Oral Q6H PRN    ARIPiprazole (ABILIFY) tablet 15 mg  15 mg Oral BID    aspirin chewable tablet 81 mg  81 mg Oral Daily    clonazePAM (KlonoPIN) tablet 0 5 mg  0 5 mg Oral BID    enoxaparin (LOVENOX) subcutaneous injection 30 mg  30 mg Subcutaneous Q24H ALICIA    hydrocortisone sodium succinate (PF) (Solu-CORTEF) injection 25 mg  25 mg Intravenous Q12H [address]    levothyroxine tablet 25 mcg  25 mcg Oral Early Morning    ondansetron (ZOFRAN) injection 4 mg  4 mg Intravenous Q6H PRN    polyethylene glycol (MIRALAX) packet 17 g  17 g Oral Daily    potassium-sodium phosphateS (K-PHOS,PHOSPHA 250) -250 mg tablet 1 tablet  1 tablet Oral BID With Meals    sodium chloride (HYPERTONIC) 3 % 600 mL in sterile water 400 mL infusion   Intravenous Continuous    thiamine tablet 100 mg  100 mg Oral Daily    traZODone (DESYREL) tablet 50 mg  50 mg Oral HS     Allergies   Allergen Reactions    Codeine     Penicillins     Sulfa Antibiotics        Objective   No intake or output data in the 24 hours ending 02/02/21 1201  Body mass index is 14 14 kg/m²      Invasive Devices:        PHYSICAL EXAM:  BP 92/70 (BP Location: Right arm)   Pulse 97   Temp (!) 95 9 °F (35 5 °C) (Tympanic)   Resp 20   Wt 31 8 kg (70 lb)   SpO2 98%   BMI 14 14 kg/m²     Physical Exam  Constitutional:       General: She is not in acute distress  Appearance: She is not toxic-appearing or diaphoretic  HENT:      Head: Atraumatic  Comments: Temporal wasting     Mouth/Throat:      Mouth: Mucous membranes are dry  Eyes:      General: No scleral icterus  Extraocular Movements: Extraocular movements intact  Neck:      Musculoskeletal: Normal range of motion and neck supple  Cardiovascular:      Rate and Rhythm: Normal rate and regular rhythm  Heart sounds: No friction rub  No gallop  Pulmonary:      Effort: Pulmonary effort is normal  No respiratory distress  Breath sounds: Normal breath sounds  No wheezing, rhonchi or rales  Abdominal:      General: Bowel sounds are normal  There is no distension  Palpations: Abdomen is soft  Tenderness: There is no abdominal tenderness  There is no rebound  Neurological:      Mental Status: She is alert and oriented to person, place, and time  Current Weight: Weight - Scale: 31 8 kg (70 lb)  First Weight: Weight - Scale: 31 8 kg (70 lb)    Lab Results:    Results from last 7 days   Lab Units 02/02/21  0507   WBC Thousand/uL 5 09   HEMOGLOBIN g/dL 11 4*   HEMATOCRIT % 32 7*   PLATELETS Thousands/uL 210     Results from last 7 days   Lab Units 02/02/21  0509   POTASSIUM mmol/L 4 1   CHLORIDE mmol/L 91*   CO2 mmol/L 22   BUN mg/dL 3*   CREATININE mg/dL 0 38*   CALCIUM mg/dL 7 3*     Results from last 7 days   Lab Units 02/02/21  0509  02/01/21  0944   POTASSIUM mmol/L 4 1   < > 3 5   CHLORIDE mmol/L 91*   < > 85*   CO2 mmol/L 22   < > 25   BUN mg/dL 3*   < > 4*   CREATININE mg/dL 0 38*   < > 0 58   CALCIUM mg/dL 7 3*   < > 9 0   ALK PHOS U/L  --   --  35 5   ALT U/L  --   --  9   AST U/L  --   --  28    < > = values in this interval not displayed

## 2021-02-02 NOTE — ASSESSMENT & PLAN NOTE
Elevated troponin with peak of 3 6, trending down  Type 2 MI 2/2 increased demand from hypovolemia, fall, starvation  Follow-up echocardiogram to rule out any cardiomyopathy  Follow-up troponin in a m

## 2021-02-02 NOTE — PLAN OF CARE
Problem: Potential for Falls  Goal: Patient will remain free of falls  Description: INTERVENTIONS:  - Assess patient frequently for physical needs  -  Identify cognitive and physical deficits and behaviors that affect risk of falls  -  [place] fall precautions as indicated by assessment   - Educate patient/family on patient safety including physical limitations  - Instruct patient to call for assistance with activity based on assessment  - Modify environment to reduce risk of injury  - Consider OT/PT consult to assist with strengthening/mobility  Outcome: Progressing     Problem: PAIN - ADULT  Goal: Verbalizes/displays adequate comfort level or baseline comfort level  Description: Interventions:  - Encourage patient to monitor pain and request assistance  - Assess pain using appropriate pain scale  - Administer analgesics based on type and severity of pain and evaluate response  - Implement non-pharmacological measures as appropriate and evaluate response  - Consider cultural and social influences on pain and pain management  - Notify physician/advanced practitioner if interventions unsuccessful or patient reports new pain  Outcome: Progressing     Problem: INFECTION - ADULT  Goal: Absence or prevention of progression during hospitalization  Description: INTERVENTIONS:  - Assess and monitor for signs and symptoms of infection  - Monitor lab/diagnostic results  - Monitor all insertion sites, i e  indwelling lines, tubes, and drains  - Administer medications as ordered  - Instruct and encourage patient and family to use good hand hygiene technique  - Identify and instruct in appropriate isolation precautions for identified infection/condition  Outcome: Progressing     Problem: SAFETY ADULT  Goal: Patient will remain free of falls  Description: INTERVENTIONS:  - Assess patient frequently for physical needs  -  Identify cognitive and physical deficits and behaviors that affect risk of falls    -  [place] fall precautions as indicated by assessment   - Educate patient/family on patient safety including physical limitations  - Instruct patient to call for assistance with activity based on assessment  - Modify environment to reduce risk of injury  - Consider OT/PT consult to assist with strengthening/mobility  Outcome: Progressing  Goal: Maintain or return to baseline ADL function  Description: INTERVENTIONS:  -  Assess patient's ability to carry out ADLs; assess patient's baseline for ADL function and identify physical deficits which impact ability to perform ADLs (bathing, care of mouth/teeth, toileting, grooming, dressing, etc )  - Assess/evaluate cause of self-care deficits   - Assess range of motion  - Assess patient's mobility; develop plan if impaired  - Assess patient's need for assistive devices and provide as appropriate  - Encourage maximum independence but intervene and supervise when necessary  - Involve family in performance of ADLs  - Assess for home care needs following discharge   - Consider OT consult to assist with ADL evaluation and planning for discharge  - Provide patient education as appropriate  Outcome: Progressing  Goal: Maintain or return mobility status to optimal level  Description: INTERVENTIONS:  - Assess patient's baseline mobility status (ambulation, transfers, stairs, etc )    - Identify cognitive and physical deficits and behaviors that affect mobility  - Identify mobility aids required to assist with transfers and/or ambulation (gait belt, sit-to-stand, lift, walker, cane, etc )  - [place] fall precautions as indicated by assessment  - Record patient progress and toleration of activity level on Mobility SBAR; progress patient to next Phase/Stage  - Instruct patient to call for assistance with activity based on assessment  - Consider rehabilitation consult to assist with strengthening/weightbearing, etc   Outcome: Progressing     Problem: DISCHARGE PLANNING  Goal: Discharge to home or other facility with appropriate resources  Description: INTERVENTIONS:  - Identify barriers to discharge w/patient and caregiver  - Arrange for needed discharge resources and transportation as appropriate  - Identify discharge learning needs (meds, wound care, etc )  - Arrange for interpretive services to assist at discharge as needed  - Refer to Case Management Department for coordinating discharge planning if the patient needs post-hospital services based on physician/advanced practitioner order or complex needs related to functional status, cognitive ability, or social support system  Outcome: Progressing     Problem: Knowledge Deficit  Goal: Patient/family/caregiver demonstrates understanding of disease process, treatment plan, medications, and discharge instructions  Description: Complete learning assessment and assess knowledge base  Interventions:  - Provide teaching at level of understanding  - Provide teaching via preferred learning methods  Outcome: Progressing     Problem: Nutrition/Hydration-ADULT  Goal: Nutrient/Hydration intake appropriate for improving, restoring or maintaining nutritional needs  Description: Monitor and assess patient's nutrition/hydration status for malnutrition  Collaborate with interdisciplinary team and initiate plan and interventions as ordered  Monitor patient's weight and dietary intake as ordered or per policy  Utilize nutrition screening tool and intervene as necessary  Determine patient's food preferences and provide high-protein, high-caloric foods as appropriate       INTERVENTIONS:  - Monitor oral intake, urinary output, labs, and treatment plans  - Assess nutrition and hydration status and recommend course of action  - Evaluate amount of meals eaten  - Assist patient with eating if necessary   - Allow adequate time for meals  - Recommend/ encourage appropriate diets, oral nutritional supplements, and vitamin/mineral supplements  - Order, calculate, and assess calorie counts as needed  - Recommend, monitor, and adjust tube feedings and TPN/PPN based on assessed needs  - Assess need for intravenous fluids  - Provide specific nutrition/hydration education as appropriate  - Include patient/family/caregiver in decisions related to nutrition  Outcome: Progressing     Problem: NEUROSENSORY - ADULT  Goal: Achieves stable or improved neurological status  Description: INTERVENTIONS  - Monitor and report changes in neurological status  - Monitor vital signs such as temperature, blood pressure, glucose, and any other labs ordered   - Initiate measures to prevent increased intracranial pressure  - Monitor for seizure activity and implement precautions if appropriate      Outcome: Progressing     Problem: CARDIOVASCULAR - ADULT  Goal: Maintains optimal cardiac output and hemodynamic stability  Description: INTERVENTIONS:  - Monitor I/O, vital signs and rhythm  - Monitor for S/S and trends of decreased cardiac output  - Administer and titrate ordered vasoactive medications to optimize hemodynamic stability  - Assess quality of pulses, skin color and temperature  - Assess for signs of decreased coronary artery perfusion  - Instruct patient to report change in severity of symptoms  Outcome: Progressing  Goal: Absence of cardiac dysrhythmias or at baseline rhythm  Description: INTERVENTIONS:  - Continuous cardiac monitoring, vital signs, obtain 12 lead EKG if ordered  - Administer antiarrhythmic and heart rate control medications as ordered  - Monitor electrolytes and administer replacement therapy as ordered  Outcome: Progressing     Problem: RESPIRATORY - ADULT  Goal: Achieves optimal ventilation and oxygenation  Description: INTERVENTIONS:  - Assess for changes in respiratory status  - Assess for changes in mentation and behavior  - Position to facilitate oxygenation and minimize respiratory effort  - Oxygen administered by appropriate delivery if ordered  - Initiate smoking cessation education as indicated  - Encourage broncho-pulmonary hygiene including cough, deep breathe, Incentive Spirometry  - Assess the need for suctioning and aspirate as needed  - Assess and instruct to report SOB or any respiratory difficulty  - Respiratory Therapy support as indicated  Outcome: Progressing     Problem: GASTROINTESTINAL - ADULT  Goal: Minimal or absence of nausea and/or vomiting  Description: INTERVENTIONS:  - Administer IV fluids if ordered to ensure adequate hydration  - Maintain NPO status until nausea and vomiting are resolved  - Nasogastric tube if ordered  - Administer ordered antiemetic medications as needed  - Provide nonpharmacologic comfort measures as appropriate  - Advance diet as tolerated, if ordered  - Consider nutrition services referral to assist patient with adequate nutrition and appropriate food choices  Outcome: Progressing  Goal: Maintains adequate nutritional intake  Description: INTERVENTIONS:  - Monitor percentage of each meal consumed  - Identify factors contributing to decreased intake, treat as appropriate  - Assist with meals as needed  - Monitor I&O, weight, and lab values if indicated  - Obtain nutrition services referral as needed  Outcome: Progressing     Problem: GENITOURINARY - ADULT  Goal: Maintains or returns to baseline urinary function  Description: INTERVENTIONS:  - Assess urinary function  - Encourage oral fluids to ensure adequate hydration if ordered  - Administer IV fluids as ordered to ensure adequate hydration  - Administer ordered medications as needed  - Offer frequent toileting  - Follow urinary retention protocol if ordered  Outcome: Progressing  Goal: Absence of urinary retention  Description: INTERVENTIONS:  - Assess patients ability to void and empty bladder  - Monitor I/O  - Bladder scan as needed  - Discuss with physician/AP medications to alleviate retention as needed  - Discuss catheterization for long term situations as appropriate  Outcome: Progressing     Problem: METABOLIC, FLUID AND ELECTROLYTES - ADULT  Goal: Electrolytes maintained within normal limits  Description: INTERVENTIONS:  - Monitor labs and assess patient for signs and symptoms of electrolyte imbalances  - Administer electrolyte replacement as ordered  - Monitor response to electrolyte replacements, including repeat lab results as appropriate  - Instruct patient on fluid and nutrition as appropriate  Outcome: Progressing  Goal: Fluid balance maintained  Description: INTERVENTIONS:  - Monitor labs   - Monitor I/O and WT  - Instruct patient on fluid and nutrition as appropriate  - Assess for signs & symptoms of volume excess or deficit  Outcome: Progressing     Problem: SKIN/TISSUE INTEGRITY - ADULT  Goal: Skin integrity remains intact  Description: INTERVENTIONS  - Identify patients at risk for skin breakdown  - Assess and monitor skin integrity  - Assess and monitor nutrition and hydration status  - Monitor labs (i e  albumin)  - Assess for incontinence   - Turn and reposition patient  - Assist with mobility/ambulation  - Relieve pressure over bony prominences  - Avoid friction and shearing  - Provide appropriate hygiene as needed including keeping skin clean and dry  - Evaluate need for skin moisturizer/barrier cream  - Collaborate with interdisciplinary team (i e  Nutrition, Rehabilitation, etc )   - Patient/family teaching  Outcome: Progressing     Problem: HEMATOLOGIC - ADULT  Goal: Maintains hematologic stability  Description: INTERVENTIONS  - Assess for signs and symptoms of bleeding or hemorrhage  - Monitor labs  - Administer supportive blood products/factors as ordered and appropriate  Outcome: Progressing     Problem: MUSCULOSKELETAL - ADULT  Goal: Maintain or return mobility to safest level of function  Description: INTERVENTIONS:  - Assess patient's ability to carry out ADLs; assess patient's baseline for ADL function and identify physical deficits which impact ability to perform ADLs (bathing, care of mouth/teeth, toileting, grooming, dressing, etc )  - Assess/evaluate cause of self-care deficits   - Assess range of motion  - Assess patient's mobility  - Assess patient's need for assistive devices and provide as appropriate  - Encourage maximum independence but intervene and supervise when necessary  - Involve family in performance of ADLs  - Assess for home care needs following discharge   - Consider OT consult to assist with ADL evaluation and planning for discharge  - Provide patient education as appropriate  Outcome: Progressing     Problem: DISCHARGE PLANNING - CARE MANAGEMENT  Goal: Discharge to post-acute care or home with appropriate resources  Description: INTERVENTIONS:  - Conduct assessment to determine patient/family and health care team treatment goals, and need for post-acute services based on payer coverage, community resources, and patient preferences, and barriers to discharge  - Address psychosocial, clinical, and financial barriers to discharge as identified in assessment in conjunction with the patient/family and health care team  - Arrange appropriate level of post-acute services according to patients   needs and preference and payer coverage in collaboration with the physician and health care team  - Communicate with and update the patient/family, physician, and health care team regarding progress on the discharge plan  - Arrange appropriate transportation to post-acute venues  Outcome: Progressing     Problem: Prexisting or High Potential for Compromised Skin Integrity  Goal: Skin integrity is maintained or improved  Description: INTERVENTIONS:  - Identify patients at risk for skin breakdown  - Assess and monitor skin integrity  - Assess and monitor nutrition and hydration status  - Monitor labs   - Assess for incontinence   - Turn and reposition patient  - Assist with mobility/ambulation  - Relieve pressure over bony prominences  - Avoid friction and shearing  - Provide appropriate hygiene as needed including keeping skin clean and dry  - Evaluate need for skin moisturizer/barrier cream  - Collaborate with interdisciplinary team   - Patient/family teaching  - Consider wound care consult   Outcome: Progressing

## 2021-02-02 NOTE — PROGRESS NOTES
Progress Note Raegan Purpura 1955, 72 y o  female MRN: [id number]    Unit/Bed#: -01 Encounter: [id number]    Primary Care Provider: Ortega Worthy DO   Date and time admitted to hospital: 2/1/2021  9:10 AM        * Hyponatremia  Assessment & Plan  Sodium of 120 on admission  Baseline around 130  Initially treated with normal saline changed to 1 8 normal saline  Poor oral intake due to generalized weakness  Tachycardia and hypotensive on presentation  Low serum osmolality and high urine osmolality with IV showed infiltration pointing more towards SIADH  Follow-up BMP in p m  And in a m  Continue 1 8 normal saline 40 cc/hour  Nephrology consulted, recommendation appreciated    Generalized weakness  Assessment & Plan  Complaining of generalized weakness since couple of months worsening since last 7 days  Compliant with steroid and thyroid medication  However etiology may be 2/2 low hormone level vs hyponatremia  See plan for hyponatremia    Type 2 MI (myocardial infarction) [facility])  Assessment & Plan  Elevated troponin with peak of 3 6, trending down  Type 2 MI 2/2 increased demand from hypovolemia, fall, starvation  Follow-up echocardiogram to rule out any cardiomyopathy  Follow-up troponin in a m  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[address] )  Assessment & Plan  On hydrocortisone 5 mg b i d  And levothyroxine 25 mcg daily  Normal free T4 and cortisol level  Continue hydrocortisone IV 25 mg b i d  Continue levothyroxine    Thyroid disorder  Assessment & Plan  Continue levothyroxine    Severe protein-calorie malnutrition (HCC)  Assessment & Plan  Malnutrition Findings:    no subcutaneous fat, loss of cheek fat, very low BMI of 14, loss of temporal fat  Encouraged on nutritional diet    Suicide attempt [facility])  Assessment & Plan  H/o suicide attempt  Recently discharged from inpatient psych in October 2020      Anorexia nervosa with significantly low body weight  Assessment & Plan  H/o anorexia nervosa  Low BMI  Follow-up psychiatry, recommendation appreciated    FTT (failure to thrive) in adult  Assessment & Plan  PT OT  CM, may need placement          VTE Pharmacologic Prophylaxis:   Pharmacologic: Enoxaparin (Lovenox)  Mechanical VTE Prophylaxis in Place: Yes    Discussions with Specialists or Other Care Team Provider:  Cardiology regarding elevated troponin    Education and Discussions with Family / Patient:  None    Current Length of Stay: 1 day(s)    Current Patient Status: Inpatient     Discharge Plan / Estimated Discharge Date:  Needs further stay for management of hyponatremia    Code Status: Level 3 - DNAR and DNI      Subjective:   Patient was seen and examined by me at bedside  Communicated clearly  No particular overnight event reported  Hemodynamically stable and afebrile  Patient feels better overall  Denies any headache, confusion, abdominal pain, diarrhea  No neurological signs and symptoms  No weakness  Tolerating diet well  Wants regular diet  Improvement in weakness  No bowel movement today  Objective:     Vitals:   Temp (24hrs), Av 8 °F (36 °C), Min:95 9 °F (35 5 °C), Max:97 7 °F (36 5 °C)    Temp:  [95 9 °F (35 5 °C)-97 7 °F (36 5 °C)] 97 7 °F (36 5 °C)  HR:  [] 104  Resp:  [20] 20  BP: (90-92)/(69-70) 90/69  SpO2:  [98 %] 98 %  Body mass index is 14 14 kg/m²  Input and Output Summary (last 24 hours): Intake/Output Summary (Last 24 hours) at 2021 1710  Last data filed at 2021 1000  Gross per 24 hour   Intake --   Output 1000 ml   Net -1000 ml       Physical Exam:     Physical Exam  Vitals signs reviewed  Constitutional:       General: She is not in acute distress  Appearance: She is well-developed  She is ill-appearing  Comments: cachetic   HENT:      Head: Normocephalic and atraumatic  Comments: Temporal wasting  Cardiovascular:      Rate and Rhythm: Normal rate and regular rhythm        Pulses: Normal pulses  Heart sounds: Normal heart sounds  Pulmonary:      Effort: Pulmonary effort is normal       Breath sounds: Normal breath sounds  Chest:      Chest wall: No tenderness  Abdominal:      General: Bowel sounds are normal  There is no distension  Palpations: Abdomen is soft  Tenderness: There is no abdominal tenderness  Musculoskeletal:      Right lower leg: No edema  Left lower leg: No edema  Skin:     General: Skin is warm  Coloration: Skin is not pale  Neurological:      General: No focal deficit present  Mental Status: She is alert and oriented to person, place, and time  Mental status is at baseline  Cranial Nerves: No cranial nerve deficit  Motor: Weakness (generalized) present  Psychiatric:         Mood and Affect: Mood normal          Behavior: Behavior normal            Additional Data:     Labs:    Results from last 7 days   Lab Units 02/02/21  0507   WBC Thousand/uL 5 09   HEMOGLOBIN g/dL 11 4*   HEMATOCRIT % 32 7*   PLATELETS Thousands/uL 210   NEUTROS PCT % 83*   LYMPHS PCT % 13*   MONOS PCT % 4   EOS PCT % 0     Results from last 7 days   Lab Units 02/02/21  0509  02/01/21  0944   POTASSIUM mmol/L 4 1   < > 3 5   CHLORIDE mmol/L 91*   < > 85*   CO2 mmol/L 22   < > 25   BUN mg/dL 3*   < > 4*   CREATININE mg/dL 0 38*   < > 0 58   CALCIUM mg/dL 7 3*   < > 9 0   ALK PHOS U/L  --   --  35 5   ALT U/L  --   --  9   AST U/L  --   --  28    < > = values in this interval not displayed  * I Have Reviewed All Lab Data Listed Above  * Additional Pertinent Lab Tests Reviewed: [facility] Admission Reviewed    Imaging:  XR chest 1 view portable   ED Interpretation by Adeel Bello MD (02/01 1122)   No infiltrates  No CHF  Final Result by Vicki Sumner MD (02/01 1049)      No acute cardiopulmonary disease                    Workstation performed: [id number]         CT lumbar spine without contrast   Final Result by Nirmala Echeverria Janki Deleon MD (02/01 1037)      No fracture or malalignment identified  Workstation performed: [id number]         CT thoracic spine without contrast   Final Result by Monik Morse MD (02/01 1037)      No fracture or malalignment identified  Workstation performed: [id number]         CT cervical spine without contrast   Final Result by Monik Morse MD (02/01 1021)      No cervical spine fracture or traumatic malalignment  Workstation performed: [id number]         CT head without contrast   Final Result by Monik Morse MD (02/01 1022)      No acute intracranial abnormality  Workstation performed: [id number]            Imaging Reports Reviewed Today Include:  CT head, CT spine, CXR  Imaging Personally Reviewed by Myself Includes:  Same as above    Recent Cultures (last 7 days):     Results from last 7 days   Lab Units 02/01/21  0956 02/01/21  0945   BLOOD CULTURE  No Growth at 24 hrs  No Growth at 24 hrs         Last 24 Hours Medication List:   Current Facility-Administered Medications   Medication Dose Route Frequency Provider Last Rate    acetaminophen  650 mg Oral Q6H PRN Bam Martinez MD      ARIPiprazole  15 mg Oral BID Bam Martinez MD      aspirin  81 mg Oral Daily Bam Martinez MD      clonazePAM  0 5 mg Oral BID Az Lugo MD      enoxaparin  30 mg Subcutaneous Q24H [address] Bam Martinez MD      hydrocortisone sodium succinate  25 mg Intravenous Q12H [address] Bam Martinez MD      levothyroxine  25 mcg Oral Early Morning Bam Martinez MD      ondansetron  4 mg Intravenous Q6H PRN Bam Martinez MD      polyethylene glycol  17 g Oral Daily Bam Martinez MD      potassium-sodium phosphateS  1 tablet Oral BID With Meals Bam Martinez MD      IV infusion builder   Intravenous Continuous Jaison Camacho MD 40 mL/hr at 02/02/21 0223    thiamine  100 mg Oral Daily Linda Garcia MD      traZODone  50 mg Oral HS Orion Nam MD          Today, Patient Was Seen By: Orion Nam MD    ** Please Note: This note has been constructed using a voice recognition system   **

## 2021-02-02 NOTE — QUICK NOTE
Hypertonic saline (1 8%) has been started at 10:00 pm on 02/01  Patient's repeat BMP at 1:30 am : Na - 118  Discussed with on-call nephrologist - Dr Larry Genao who recommends increasing the rate to 40 ml/hr  Will repeat BMP at 5:30 am    Plan communicated with nurse in charge of pt - Lincoln Elkins

## 2021-02-02 NOTE — CONSULTS
Consultation - Cardiology   Yahaira Chavis 72 y o  female MRN: [id number]  Unit/Bed#: -01 Encounter: [id number]    Inpatient consult to Cardiology  Consult performed by: Kam Tovar MD  Consult ordered by: Mu Mishra MD          History of Present Illness   Physician Requesting Consult: Red Hammer MD  Reason for Consult / Principal Problem: Elevated troponin     HPI: Yahaira Chavis is a 72y o  year old female with anorexia, PTSD, panhypopituitarism s/ adenoma removal, who presented to [address] [place] [address] yesterday with generalized weakness over past several months, worsening over past several days  Has had mechanical fall from weakness 3 days ago, and has not been eating or drinking due to weakness  Of note, Na was very low at 120 on admission  Troponin elevated at 3 64, but patient denies any cardiac symptoms  No chest pain, shortness of breath, or palpitations  Review of Systems   Constitution: Positive for malaise/fatigue and weight loss  Negative for chills, diaphoresis and fever  HENT: Negative  Eyes: Negative  Cardiovascular: Negative for chest pain, dyspnea on exertion, leg swelling and palpitations  Respiratory: Negative for cough, shortness of breath, snoring and wheezing  Endocrine: Negative  Hematologic/Lymphatic: Negative  Skin: Negative for rash  Musculoskeletal: Negative  Gastrointestinal: Negative for abdominal pain, constipation and diarrhea  Genitourinary: Negative for bladder incontinence, dysuria and frequency  Neurological: Positive for weakness  Negative for dizziness and light-headedness  Psychiatric/Behavioral: Negative  All other systems reviewed and are negative      Historical Information   Past Medical History:   Diagnosis Date    Anxiety     Disease of thyroid gland     History of OCD (obsessive compulsive disorder)     Hypertension     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[address] )      Past Surgical History:   Procedure Laterality Date  ABDOMINAL SURGERY      BRAIN SURGERY      HYSTERECTOMY       Social History     Substance and Sexual Activity   Alcohol Use Not Currently    Frequency: Never     Social History     Substance and Sexual Activity   Drug Use Never     Social History     Tobacco Use   Smoking Status Current Every Day Smoker    Packs/day: 0 25    Years: 20 00    Pack years: 5 00    Types: Cigarettes   Smokeless Tobacco Never Used   Tobacco Comment    1 cig  Day     Family History: non-contributory    Meds/Allergies   Current Facility-Administered Medications   Medication Dose Route Frequency Provider Last Rate    acetaminophen  650 mg Oral Q6H PRN Tiffany Christensen MD      ARIPiprazole  15 mg Oral BID Tiffany Christensen MD      aspirin  81 mg Oral Daily Tiffany Christensen MD      clonazePAM  0 5 mg Oral BID Radha Merrill MD      enoxaparin  30 mg Subcutaneous Q24H [address] Tiffany Christensen MD      hydrocortisone sodium succinate  25 mg Intravenous Q12H [address] Tiffany Christensen MD      levothyroxine  25 mcg Oral Early Morning Tiffany Christensen MD      ondansetron  4 mg Intravenous Q6H PRN Tiffany Christensen MD      polyethylene glycol  17 g Oral Daily Tiffany Christensen MD      potassium-sodium phosphateS  1 tablet Oral BID With Meals Tiffany Christensen MD      IV infusion builder   Intravenous Continuous Lisandro Perez MD 40 mL/hr at 02/02/21 0223    thiamine  100 mg Oral Daily Boni Chapman MD      traZODone  50 mg Oral HS Tiffany Christensen MD       IV infusion builder, , Last Rate: 40 mL/hr at 02/02/21 0223      Medications Prior to Admission   Medication    ARIPiprazole (ABILIFY) 15 mg tablet    aspirin 81 mg chewable tablet    clonazePAM (KlonoPIN) 0 5 mg tablet    cloNIDine (CATAPRES) 0 1 mg tablet    ferrous sulfate 325 (65 Fe) mg tablet    hydrocortisone (CORTEF) 5 mg tablet    levothyroxine 25 mcg tablet    traZODone (DESYREL) 50 mg tablet       Allergies   Allergen Reactions    Codeine     Penicillins     Sulfa Antibiotics        Objective   Vitals: Blood pressure 92/70, pulse 97, temperature (!) 95 9 °F (35 5 °C), temperature source Tympanic, resp  rate 20, weight 31 8 kg (70 lb), SpO2 98 %  , Body mass index is 14 14 kg/m² ,   Orthostatic Blood Pressures      Most Recent Value   Blood Pressure  92/70 filed at 02/02/2021 0827   Patient Position - Orthostatic VS  Lying filed at 02/02/2021 6755        Systolic (54LUI), MVO:08 , Min:92 , KNC:94     Diastolic (44ZWC), PVL:87, Min:58, Max:70    No intake or output data in the 24 hours ending 02/02/21 1508    Weight (last 2 days)     Date/Time   Weight    02/01/21 0907   31 8 (70)              Invasive Devices     Peripheral Intravenous Line            Peripheral IV 02/01/21 Right Antecubital 1 day                  Physical Exam  Constitutional:       Appearance: She is cachectic  HENT:      Head: Normocephalic and atraumatic  Neck:      Vascular: No JVD  Cardiovascular:      Rate and Rhythm: Regular rhythm  Tachycardia present  Heart sounds: Normal heart sounds  No murmur  No friction rub  No gallop  Pulmonary:      Effort: Pulmonary effort is normal       Breath sounds: Normal breath sounds  No wheezing or rales  Abdominal:      General: Bowel sounds are normal  There is no distension  Palpations: Abdomen is soft  Tenderness: There is no abdominal tenderness  Skin:     General: Skin is warm and dry  Findings: No erythema or rash  Neurological:      Mental Status: She is alert and oriented to person, place, and time  Deep Tendon Reflexes: Reflexes are normal and symmetric               Laboratory Results:  Results from last 7 days   Lab Units 02/01/21  1800 02/01/21  1248 02/01/21  0944   CK TOTAL U/L  --   --  190 2   TROPONIN I ng/mL 2 52* 3 50* 3 64*   CK MB INDEX %  --   --  10 3*       CBC with diff:   Results from last 7 days   Lab Units 02/02/21  0507 02/01/21  0944   WBC Thousand/uL 5 09 6 11 HEMOGLOBIN g/dL 11 4* 12 9   HEMATOCRIT % 32 7* 36 4   MCV fL 79* 80*   PLATELETS Thousands/uL 210 254   MCH pg 27 7 28 4   MCHC g/dL 34 9 35 4   RDW % 13 4 13 6   MPV fL 10 4 9 7         CMP:  Results from last 7 days   Lab Units 02/02/21  0509 02/02/21  0123 02/01/21 2200 02/01/21  1800 02/01/21  0944   POTASSIUM mmol/L 4 1 4 5 3 7 3 1* 3 5   CHLORIDE mmol/L 91* 90* 89* 90* 85*   CO2 mmol/L 22 22 21* 22 25   BUN mg/dL 3* 3* 4* 4* 4*   CREATININE mg/dL 0 38* 0 38* 0 43 0 44 0 58   CALCIUM mg/dL 7 3* 7 2* 6 9* 7 2* 9 0   AST U/L  --   --   --   --  28   ALT U/L  --   --   --   --  9   ALK PHOS U/L  --   --   --   --  35 5   EGFR ml/min/1 73sq m 112 112 107 106 97         BMP:  Results from last 7 days   Lab Units 02/02/21  0509 02/02/21  0123 02/01/21 2200 02/01/21  1800 02/01/21  0944   POTASSIUM mmol/L 4 1 4 5 3 7 3 1* 3 5   CHLORIDE mmol/L 91* 90* 89* 90* 85*   CO2 mmol/L 22 22 21* 22 25   BUN mg/dL 3* 3* 4* 4* 4*   CREATININE mg/dL 0 38* 0 38* 0 43 0 44 0 58   CALCIUM mg/dL 7 3* 7 2* 6 9* 7 2* 9 0       BNP:     Magnesium:   Results from last 7 days   Lab Units 02/02/21  0509 02/01/21  0944   MAGNESIUM mg/dL 1 9 1 8             Imaging:   Xr Chest 1 View Portable  Result Date: 2/1/2021  Impression: No acute cardiopulmonary disease  EKG reviewed personally: Sinus tachycardiac 107 PVCs septal infarct  Telemetry reviewed personally: Sinus tachycardia    Assessment:  Principal Problem:    Hyponatremia  Active Problems:    FTT (failure to thrive) in adult    Anorexia nervosa with significantly low body weight    Suicide attempt ([facility] [address] )    Severe protein-calorie malnutrition (HCC)    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HCC)    Thyroid disorder    Type 2 MI (myocardial infarction) (HCC)    Generalized weakness      Impression:  1  Non-MI troponin elevation - due to hyponatremia, malnourishment, and falls  No evidence of myocardial ischemia  2  Hyponatremia  3  Malnourishment    Plan:  1  Check echo    2  No acute cardiac intervention necessary at this time, as no evidence of ischemia  However, if has a cardiomyopathy by echo, would consider goal directed medical therapy for LV dysfunction

## 2021-02-03 PROBLEM — E87.1 HYPONATREMIA: Status: RESOLVED | Noted: 2020-01-01 | Resolved: 2021-01-01

## 2021-02-03 NOTE — ASSESSMENT & PLAN NOTE
Generalized weakness 2/2 multiple etiologies like hyponatremia, adrenal insufficiency, hypothyroidism  Under dosed on steroid and thyroid medication  Compliance? [facility] services like Nephrology, Endocrinology on board      See plan for hyponatremia and hypopituitarism

## 2021-02-03 NOTE — ASSESSMENT & PLAN NOTE
Elevated troponin with peak of 3 6, trending down  No chest pain  EKG showing no changes  Type 2 MI 2/2 increased demand from hypovolemia, fall, mild nourishment  Echocardiogram showing 30% EF most likely 2/2 hypervolemic cardiomyopathy due to persistent tachycardia and hypokalemia      Pt was transferred to another [address] [place] [address] for nuclear stress test  Cardiology consulted, recommendation appreciated

## 2021-02-03 NOTE — PLAN OF CARE
Problem: PHYSICAL THERAPY ADULT  Goal: Performs mobility at highest level of function for planned discharge setting  See evaluation for individualized goals  Description: Treatment/Interventions: ADL retraining, Functional transfer training, LE strengthening/ROM, Elevations, Therapeutic exercise, Endurance training, Patient/family training, Equipment eval/education, Bed mobility, Gait training, Compensatory technique education, Continued evaluation, Spoke to MD, Spoke to nursing, Spoke to case management, OT  Equipment Recommended: Other (Comment)(TBD pending progress)       See flowsheet documentation for full assessment, interventions and recommendations  Outcome: Progressing  Note: Prognosis: Fair  Problem List: Decreased strength, Decreased endurance, Impaired balance, Decreased mobility, Decreased coordination, Decreased safety awareness, Decreased skin integrity, Pain  Assessment: Pt seen for PT evaluation, cleared by RN Estelita Phoenix, activity orders of "up with assist"  Pt admitted 2/1/2021 s/p fall resulting in ambulatory dysfunction & back/neck pain, dx Hyponatremia  Comorbidities affecting pt's fnxl performance include: anxiety, falls, hypertension, hypothyroid, MI, OCD, anorexia with low BMI, PTSD, brain surgery  Personal factors affecting pt at time of PT IE include: inaccessible home environment, BANDAR the home, inability to ambulate household or community distances, inability to navigate level surfaces w/o external assistance, decreased cognition, limited home support, +fall history, anxiety, inability to perform physical activity, inability to perform ADLs or IADLs, inability to live alone  PTA, pt was independent w/ functional mobility w/o AD,  independent with ADLS, requiring assist for IADLS, living alone in a 1SH with 4STE, ambulating household distance   Pt demonstrates fnxl impairments identified in objective findings from Elderly Mobility Scale assessment, scoring 3/20, indicating high degree of dependency on others for ADLs/mobility completion  Pt scores 10/24 on AM-PAC objective tool, indicating need for extensive rehab services  The Barthel Index outcome tool was used & pt scores 35 indicating severe limitations of fnxl mobility/ADLS  Pt is at risk of falls d/t multiple comorbidities, history of previous falls, impaired balance, impaired cognition, impaired insight/safety awareness, use of assistive device, varying levels of pain , advanced age, acuity of medical illness, ongoing medical treatment of primary diagnosis and abnormal lab values  Pt's clinical presentation is currently unstable/unpredictable seen in pt's presentation of vital sign response, changing level of pain, varying levels of cognitive performance, increased fall risk, new onset of impairment of functional mobility, decreased endurance and new onset of weakness  This PT IE requires high complexity clinical decision making, based on multiple medical/physiological/fnxl/social factors which affect pt's performance w/ evaluation & therapist judgement for disposition recommendations  Pt will benefit from continued PT treatment in order to address impairments, decrease risk of falls, maximize independence w/ fnxl mobility, & ensure safety w/ mobility for transition to next level of care  Based on pt presentation & impairments, pt would most appropriately benefit from post acute STR upon d/c    Barriers to Discharge: Decreased caregiver support, Inaccessible home environment     PT Discharge Recommendation: [address]     PT - OK to Discharge: Yes(TO REHAB [address] )    See flowsheet documentation for full assessment

## 2021-02-03 NOTE — PROGRESS NOTES
NEPHROLOGY PROGRESS NOTE    Wendy Cope 72 y o  female MRN: [id number]  Unit/Bed#: -01 Encounter: [id number]  Reason for Consult:  Hyponatremia    Patient is stable overnight no acute events or confusion still feels weak  Otherwise no changes  ASSESSMENT/PLAN:  1  Hyponatremia    Patient appears to have chronic hyponatremia  Urine studies suggested euvolemia and non osmotic ADH stimulation  It could have been due to her trazodone which can rarely cause it  Regardless it improved with 1 8% saline and is 132 this morning  This point she is off IV fluids is just on fluid restriction and would monitor labs periodically  She states that her sodium concentration tends run around 130 mEq per L  The other possible contributing factor could be related to her hypopituitarism  She is on hydrocortisone as an outpatient but if she was not taking it then she may have has glucocorticoid deficiency and that can worsen and cause hyponatremia  She did correct nicely and she is on IV glucocorticoid at the present time  The a m  Cortisol level was low the other day  Can monitor off IV fluids  No other changes except when changed to oral hydrocortisone insure that she is on enough dosage  SUBJECTIVE:  Review of Systems   Constitution: Negative for chills, diaphoresis, fever and night sweats  HENT: Negative  Eyes: Negative  Cardiovascular: Negative for chest pain, dyspnea on exertion, leg swelling and orthopnea  Respiratory: Negative  Negative for cough, shortness of breath, sputum production and wheezing  Gastrointestinal: Positive for anorexia  Negative for abdominal pain, diarrhea, nausea and vomiting  Genitourinary: Negative  Neurological: Positive for weakness  Negative for focal weakness, headaches and light-headedness  Psychiatric/Behavioral: Negative for altered mental status, depression, hallucinations and hypervigilance         OBJECTIVE:  Current Weight: Weight - Scale: 31 8 kg (70 lb)  Vitals:Temp (24hrs), Av 4 °F (36 3 °C), Min:95 9 °F (35 5 °C), Max:98 5 °F (36 9 °C)  Current: Temperature: 98 5 °F (36 9 °C)   Blood pressure 95/73, pulse (!) 113, temperature 98 5 °F (36 9 °C), temperature source Tympanic, resp  rate 16, weight 31 8 kg (70 lb), SpO2 96 %  , Body mass index is 14 14 kg/m²  Intake/Output Summary (Last 24 hours) at 2/3/2021 0807  Last data filed at 2021 2230  Gross per 24 hour   Intake 200 ml   Output 1000 ml   Net -800 ml       Physical Exam: BP 95/73 (BP Location: Left arm)   Pulse (!) 113   Temp 98 5 °F (36 9 °C) (Tympanic)   Resp 16   Wt 31 8 kg (70 lb)   SpO2 96%   BMI 14 14 kg/m²   Physical Exam  Constitutional:       General: She is not in acute distress  Appearance: She is ill-appearing  She is not diaphoretic  HENT:      Head: Normocephalic and atraumatic  Mouth/Throat:      Mouth: Mucous membranes are dry  Eyes:      General: No scleral icterus  Extraocular Movements: Extraocular movements intact  Neck:      Musculoskeletal: Normal range of motion and neck supple  Cardiovascular:      Rate and Rhythm: Normal rate and regular rhythm  Heart sounds: No friction rub  No gallop  Pulmonary:      Effort: Pulmonary effort is normal  No respiratory distress  Breath sounds: Normal breath sounds  No wheezing, rhonchi or rales  Abdominal:      General: Bowel sounds are normal  There is no distension  Palpations: Abdomen is soft  Tenderness: There is no abdominal tenderness  There is no rebound  Neurological:      Mental Status: She is alert  Psychiatric:         Mood and Affect: Mood normal          Behavior: Behavior normal          Thought Content:  Thought content normal          Judgment: Judgment normal          Medications:    Current Facility-Administered Medications:     acetaminophen (TYLENOL) tablet 650 mg, 650 mg, Oral, Q6H PRN, Cristhian Reynolds MD    ARIPiprazole (ABILIFY) tablet 15 mg, 15 mg, Oral, BID, Chapin Chan MD, 15 mg at 02/02/21 2235    aspirin chewable tablet 81 mg, 81 mg, Oral, Daily, Chapin Chan MD, 81 mg at 02/02/21 0930    clonazePAM (KlonoPIN) tablet 0 5 mg, 0 5 mg, Oral, BID, Veta Kawasaki, MD, 0 5 mg at 02/02/21 1705    enoxaparin (LOVENOX) subcutaneous injection 30 mg, 30 mg, Subcutaneous, Q24H [facility], Chapin Chan MD, 30 mg at 02/02/21 0930    hydrocortisone sodium succinate (PF) (Solu-CORTEF) injection 25 mg, 25 mg, Intravenous, Q12H [facility], Chapin Chan MD, 25 mg at 02/02/21 2236    levothyroxine tablet 25 mcg, 25 mcg, Oral, Early Morning, Chapin Chan MD, 25 mcg at 02/03/21 0650    multivitamin-minerals (CENTRUM) tablet 1 tablet, 1 tablet, Oral, Daily, Aroldo Upton MD    ondansetron (ZOFRAN) injection 4 mg, 4 mg, Intravenous, Q6H PRN, Chapin Chan MD    polyethylene glycol (MIRALAX) packet 17 g, 17 g, Oral, Daily, Chapin Chan MD, 17 g at 02/02/21 0930    potassium-sodium phosphateS (K-PHOS,PHOSPHA 250) -250 mg tablet 1 tablet, 1 tablet, Oral, BID With Meals, Chapin Chan MD, 1 tablet at 02/02/21 1705    thiamine tablet 100 mg, 100 mg, Oral, Daily, Aroldo Upton MD, 100 mg at 02/02/21 0929    traZODone (DESYREL) tablet 50 mg, 50 mg, Oral, HS, Chapin Chan MD, 50 mg at 02/02/21 2235    Laboratory Results:  Lab Results   Component Value Date    WBC 5 09 02/02/2021    HGB 11 4 (L) 02/02/2021    HCT 32 7 (L) 02/02/2021    MCV 79 (L) 02/02/2021     02/02/2021     Lab Results   Component Value Date    SODIUM 132 (L) 02/03/2021    K 3 7 02/03/2021     02/03/2021    CO2 25 02/03/2021    BUN 3 (L) 02/03/2021    CREATININE 0 39 (L) 02/03/2021    GLUC 171 (H) 02/03/2021    CALCIUM 7 6 (L) 02/03/2021     Lab Results   Component Value Date    CALCIUM 7 6 (L) 02/03/2021    PHOS 1 9 (L) 02/02/2021     No results found for: LABPROT

## 2021-02-03 NOTE — PROGRESS NOTES
[address] [place] [address]    Cardiology Progress Note  Terri Aguilera 72 y o  female   YOB: 1955 MRN: [id number]  Unit/Bed#: -[place] Encounter: [id number]      Subjective:   No significant events overnight  She continues to be very weak and reports anorexia  No complains of chest pain or shortness of breath    Assessments  1  Type 2 MI  2  New-onset cardiomyopathy, LVEF 30%  3  Generalized weakness  4  Underweight / malnourished - Body mass index is 14 14 kg/m²  5  Hypothyroidism  · TSH low 0 124  6  Anxiety  7  PTSD  8  Pan-hypopituitarism  · H/o pituitary adenoma resection      Plan  Type 2 MI, new-onset cardiomyopathy  · Troponin peaked at 3 64  · Echo done, LVEF 30%  Personally reviewed - final report pending  Severe hypokinesis or akinesis mid to apical walls - possible takotsubo cardiomyopathy  · No current chest pain  · ECG without any significant ST-T changes  · Telemetry does show frequent PVCs, ? NSVT  · possibly related to nutritional deficiencies  · BP is low in 90/50s - unable to add any heart failure medications as a result  · Plan to add at least low dose metoprolol, due to PVCs, if BP better  · Recommend further ischemic evaluation with a nuclear Lexiscan stress test, to further evaluate the newly reduced EF  · She appears to be too weak/frail to undergo exercise testing or cath at this point  · Reviewed findings with primary service Dr Osorio  · Stress testing unfortunately is unavailable at [facility] at present, and she would need to be transferred for the same      Review of Systems   All other systems reviewed and are negative  Telemetry Review: NSR/sinus tachycardia  PVCs  1 episode of tachycardia, documented as VT on monitor - on review   Most of this appears to be artifact, although there are only 2 leads available and the rhythm remains unclear during a small portion of this    Objective:   Vitals: Blood pressure 94/77, pulse 86, temperature 97 6 °F (36 4 °C), temperature source Oral, resp  rate 16, weight 31 8 kg (70 lb), SpO2 94 %  , Body mass index is 14 14 kg/m² ,   Orthostatic Blood Pressures      Most Recent Value   Blood Pressure  94/77 filed at 02/03/2021 0947   Patient Position - Orthostatic VS  Lying filed at 02/03/2021 8183         Systolic (97EJU), UEM:76 , Min:90 , YPW:43     Diastolic (91YHO), OCU:43, Min:69, Max:77    Wt Readings from Last 5 Encounters:   02/01/21 31 8 kg (70 lb)   09/23/20 29 5 kg (65 lb)     I/O       02/01 0701 - 02/02 0700 02/02 0701 - 02/03 0700 02/03 0701 - 02/04 0700    I V  (mL/kg)  200 (6 3)     IV Piggyback 1000      Total Intake(mL/kg) 1000 (31 4) 200 (6 3)     Urine (mL/kg/hr)  1000 (1 3)     Total Output  1000     Net +1000 -800                      Physical Exam  Vitals signs and nursing note reviewed  Constitutional:       General: She is not in acute distress  Appearance: She is cachectic  She is not ill-appearing  Comments: Frail appearing   HENT:      Head: Normocephalic and atraumatic  Nose: No congestion  Eyes:      General: No scleral icterus  Conjunctiva/sclera: Conjunctivae normal    Neck:      Musculoskeletal: Neck supple  Vascular: No carotid bruit or JVD  Cardiovascular:      Rate and Rhythm: Normal rate and regular rhythm  Pulses: Normal pulses  Heart sounds: Normal heart sounds  No murmur  No friction rub  No gallop  Pulmonary:      Effort: Pulmonary effort is normal  No respiratory distress  Breath sounds: Normal breath sounds  No rales  Abdominal:      General: Abdomen is scaphoid  There is no distension  Palpations: Abdomen is soft  Tenderness: There is no abdominal tenderness  Musculoskeletal:         General: No swelling or tenderness  Right lower leg: No edema  Left lower leg: No edema  Skin:     General: Skin is warm  Neurological:      General: No focal deficit present        Mental Status: She is alert and oriented to person, place, and time  Mental status is at baseline  Motor: Weakness present  Psychiatric:         Mood and Affect: Mood normal          Behavior: Behavior normal          Thought Content: Thought content normal          Laboratory Results: personally reviewed  Results from last 7 days   Lab Units 02/03/21  0138 02/01/21  1800 02/01/21  1248 02/01/21  0944   CK TOTAL U/L  --   --   --  190 2   TROPONIN I ng/mL 0 51* 2 52* 3 50* 3 64*   CK MB INDEX %  --   --   --  10 3*       CBC with diff:   Results from last 7 days   Lab Units 02/02/21  0507 02/01/21  0944   WBC Thousand/uL 5 09 6 11   HEMOGLOBIN g/dL 11 4* 12 9   HEMATOCRIT % 32 7* 36 4   MCV fL 79* 80*   PLATELETS Thousands/uL 210 254   MCH pg 27 7 28 4   MCHC g/dL 34 9 35 4   RDW % 13 4 13 6   MPV fL 10 4 9 7         CMP:  Results from last 7 days   Lab Units 02/03/21 0235 02/02/21 1741 02/02/21  0509 02/02/21 0123 02/01/21 2200 02/01/21  1800 02/01/21  0944   POTASSIUM mmol/L 3 7 3 4* 4 1 4 5 3 7 3 1* 3 5   CHLORIDE mmol/L 100 96 91* 90* 89* 90* 85*   CO2 mmol/L 25 25 22 22 21* 22 25   BUN mg/dL 3* 30* 3* 3* 4* 4* 4*   CREATININE mg/dL 0 39* 0 90 0 38* 0 38* 0 43 0 44 0 58   CALCIUM mg/dL 7 6* 8 1* 7 3* 7 2* 6 9* 7 2* 9 0   AST U/L  --   --   --   --   --   --  28   ALT U/L  --   --   --   --   --   --  9   ALK PHOS U/L  --   --   --   --   --   --  35 5   EGFR ml/min/1 73sq m 111 67 112 112 107 106 97         BMP:  Results from last 7 days   Lab Units 02/03/21  0235 02/02/21 1741 02/02/21  0509 02/02/21  0123 02/01/21  2200 02/01/21  1800 02/01/21  0944   POTASSIUM mmol/L 3 7 3 4* 4 1 4 5 3 7 3 1* 3 5   CHLORIDE mmol/L 100 96 91* 90* 89* 90* 85*   CO2 mmol/L 25 25 22 22 21* 22 25   BUN mg/dL 3* 30* 3* 3* 4* 4* 4*   CREATININE mg/dL 0 39* 0 90 0 38* 0 38* 0 43 0 44 0 58   CALCIUM mg/dL 7 6* 8 1* 7 3* 7 2* 6 9* 7 2* 9 0       BNP: No results for input(s): BNP in the last 72 hours      Magnesium:   Results from last 7 days   Lab Units 02/02/21  4757 02/01/21  0944   MAGNESIUM mg/dL 1 9 1 8       Coags:       TSH:        Hemoglobin A1C       Lipid Profile:       Meds/Allergies   all current active meds have been reviewed and current meds:   Current Facility-Administered Medications   Medication Dose Route Frequency    acetaminophen (TYLENOL) tablet 650 mg  650 mg Oral Q6H PRN    ARIPiprazole (ABILIFY) tablet 15 mg  15 mg Oral BID    aspirin chewable tablet 81 mg  81 mg Oral Daily    clonazePAM (KlonoPIN) tablet 0 5 mg  0 5 mg Oral BID    enoxaparin (LOVENOX) subcutaneous injection 30 mg  30 mg Subcutaneous Q24H [facility]    ferrous sulfate tablet 325 mg  325 mg Oral Daily With Breakfast    hydrocortisone sodium succinate (PF) (Solu-CORTEF) injection 10 mg  10 mg Intravenous Q12H [facility]    levothyroxine tablet 25 mcg  25 mcg Oral Early Morning    multivitamin-minerals (CENTRUM) tablet 1 tablet  1 tablet Oral Daily    ondansetron (ZOFRAN) injection 4 mg  4 mg Intravenous Q6H PRN    polyethylene glycol (MIRALAX) packet 17 g  17 g Oral Daily    potassium-sodium phosphateS (K-PHOS,PHOSPHA 250) -250 mg tablet 1 tablet  1 tablet Oral BID With Meals    thiamine tablet 100 mg  100 mg Oral Daily    traZODone (DESYREL) tablet 50 mg  50 mg Oral HS     Medications Prior to Admission   Medication    ARIPiprazole (ABILIFY) 15 mg tablet    aspirin 81 mg chewable tablet    clonazePAM (KlonoPIN) 0 5 mg tablet    cloNIDine (CATAPRES) 0 1 mg tablet    ferrous sulfate 325 (65 Fe) mg tablet    hydrocortisone (CORTEF) 5 mg tablet    levothyroxine 25 mcg tablet    traZODone (DESYREL) 50 mg tablet            Cardiac testing: reviewed  No results found for this or any previous visit  No results found for this or any previous visit  No results found for this or any previous visit  No results found for this or any previous visit

## 2021-02-03 NOTE — OCCUPATIONAL THERAPY NOTE
Occupational Therapy Evaluation       02/03/21 1119   Note Type   Note type Evaluation   Restrictions/Precautions   Other Precautions Chair Alarm; Bed Alarm; Fall Risk;Cognitive;Telemetry   Pain Assessment   Pain Assessment Tool Perez-Baker FACES   Perez-Baker FACES Pain Rating 6   Pain Location/Orientation Orientation: Lower; Location: Back   Home Living   Type of [address] One level;Stairs to enter with rails  (4 BANDAR)   Bathroom Shower/Tub Tub/shower unit   Bathroom Toilet Standard   Bathroom Equipment Commode   Additional Comments Patient admitted to hospital with c/o weakness, difficulty ambulating   Prior Function   Level of [place] Independent with ADLs and functional mobility   Lives With Alone   ADL Assistance Independent   IADLs Independent   Falls in the last 6 months 1 to 4   Comments Per patient, has been living alone in a small apartment  Patient reporting she has not left her apartment in approx  8-12 months  AT baseline patient is independent in ADLs and mobility however has been having increased difficulty lately, has not been completing ADLs and not been able to get herself up or walk  Unsure how patient gets groceries or transportation to doctors appointments as patient has not left her home in several months and reports she has no one coming in to help her   ADL   Eating Assistance 5  Supervision/Setup   Grooming Assistance 5  Supervision/Setup   UB Bathing Assistance 3  Moderate Assistance   LB Bathing Assistance 2  Maximal [facility] 200 3  Moderate Assistance    [facility] 2  Maximal [address]  2  Maximal Assistance   Bed Mobility   Supine to Sit 3  Moderate assistance   Additional items Assist x 1; Increased time required;HOB elevated   Transfers   Sit to Stand 2  Maximal assistance   Additional items Assist x 1   Stand to Sit 2  Maximal assistance   Additional items Assist x 1   Additional Comments STS from EOB with max assist, patient with significant BLE tremors, possibly due to profuse weakness, limiting stability, patient needed seated rest break after standing only a few seconds   Balance   Static Sitting Fair -   Dynamic Sitting Poor +   Static Standing Poor   Dynamic Standing Poor   Activity Tolerance   Activity Tolerance Patient limited by fatigue  (Limited by generalized weakness)   Medical Staff Made Aware Yes, SLIM and CM    Nurse Made Aware Yes, RONDA QUEVEDO Assessment   RUE Assessment WFL   RUE Strength   RUE Overall Strength Deficits  (Weakness, MMT grossly 3+/5 throughout)   LUE Assessment   LUE Assessment WFL   LUE Strength   LUE Overall Strength Deficits  (Weakness, MMT grossly 3+/5 throughout)   Cognition   Arousal/Participation Alert; Cooperative  (Forgetful/confused at times)   Attention Attends with cues to redirect   Orientation Level Oriented X4   Following Commands Follows one step commands with increased time or repetition   Assessment   Limitation Decreased ADL status; Decreased UE strength;Decreased Safe judgement during ADL;Decreased endurance;Decreased self-care trans;Decreased high-level ADLs   Prognosis Good   Assessment Patient evaluated by Occupational Therapy  Patient admitted with Hyponatremia  The patients occupational profile, medical and therapy history includes a extensive additional review of physical, cognitive, or psychosocial history related to current functional performance  Comorbidities affecting functional mobility and ADLS include: HTN, panhypopituitarism, thyroid disease, anxiety, OCD  Prior to admission, patient was independent with functional mobility without assistive device, independent with ADLS and independent with IADLS    The evaluation identifies the following performance deficits: weakness, impaired balance, decreased endurance, increased fall risk, new onset of impairment of functional mobility, decreased ADLS, decreased IADLS, decreased activity tolerance, decreased safety awareness and decreased strength, that result in activity limitations and/or participation restrictions  This evaluation requires clinical decision making of high complexity, because the patient presents with comorbidites that affect occupational performance and required significant modification of tasks or assistance with consideration of multiple treatment options  The Barthel Index was used as a functional outcome tool presenting with a score of 35, indicating marked limitations of functional mobility and ADLS  The patient's raw score on the AM-PAC Daily Activity inpatient short form is 15, standardized score is 34 69, less than 39 4  Patients at this level are likely to benefit from DC to post-acute rehabilitation services  Please refer to the recommendation of the Occupational Therapist for safe DC planning  Patient will benefit from skilled Occupational Therapy services to address above deficits and facilitate a safe return to prior level of function  Goals   Patient Goals "I want to go to rehab"   STG Time Frame   (1-7 days)   Short Term Goal  Goals established to promote patient goal of going to rehab:  Patient will increase standing tolerance to 5 minutes during ADL task to decrease assistance level and decrease fall risk; Patient will increase bed mobility to min assist in preparation for ADLS and transfers;  Patient will increase functional mobility to and from bathroom with rolling walker with max assist to increase performance with ADLS and to use a toilet; Patient will tolerate 5 minutes of UE ROM/strengthening to increase general activity tolerance and performance in ADLS/IADLS; Patient will improve functional activity tolerance to 5 minutes of sustained functional tasks to increase participation in basic self-care and decrease assistance level;  Patient will be able to to verbalize understanding and perform energy conservation/proper body mechanics during ADLS and functional mobility at least 50% of the time with moderate cueing to decrease signs of fatigue and increase stamina to return to prior level of function; Patient will increase dynamic sitting balance to fair- to improve the ability to sit at edge of bed or on a chair for ADLS;  Patient will increase static/dynamic standing balance to poor+ to improve postural stability and decrease fall risk during standing ADLS and transfers  LTG Time Frame   (8-14 days)   Long Term Goal Patient will increase standing tolerance to 10 minutes during ADL task to decrease assistance level and decrease fall risk; Patient will increase bed mobility to supervision in preparation for ADLS and transfers; Patient will increase functional mobility to and from bathroom with rolling walker with mod assist to increase performance with ADLS and to use a toilet; Patient will tolerate 10 minutes of UE ROM/strengthening to increase general activity tolerance and performance in ADLS/IADLS; Patient will improve functional activity tolerance to 10 minutes of sustained functional tasks to increase participation in basic self-care and decrease assistance level;  Patient will be able to to verbalize understanding and perform energy conservation/proper body mechanics during ADLS and functional mobility at least 75% of the time with minimalcueing to decrease signs of fatigue and increase stamina to return to prior level of function; Patient will increase static/dynamic sitting balance to fair to improve the ability to sit at edge of bed or on a chair for ADLS;  Patient will increase static/dynamic standing balance to fair- to improve postural stability and decrease fall risk during standing ADLS and transfers     Functional Transfer Goals   Pt Will Perform All Functional Transfers   (STG mod assist, LTG min assist)   ADL Goals   Pt Will Perform Eating   (LTG independent)   Pt Will Perform Grooming   (LTG independent)   Pt Will Perform Bathing   (STG mod assist, LTG min assist) Pt Will Perform UE Dressing   (STG min assist, LTG supervision)   Pt Will Perform LE Dressing   (STG mod assist, LTG min assist)   Pt Will Perform Toileting   (STG mod assist, LTG min assist)   Plan   Treatment Interventions ADL retraining;Functional transfer training;UE strengthening/ROM; Endurance training;Patient/family training;Equipment evaluation/education; Compensatory technique education;Continued evaluation; Energy conservation; Activityengagement   Goal Expiration Date 02/17/21   OT Frequency 1-2x/wk   Additional Treatment Session   Start Time 1309   End Time 1119   Treatment Assessment Patient performed sitting EOB approx  5 minutes, supervision to maintain Fair sitting balance  Patient performed sit to stand from EOB with max assist x 1, support of UEs on RW  STand pivot transfer bed to recliner chair with RW and max assist x 1  Patient able to take few steps during transfer with RW and max assist x 1, however significantly limited by weakness and poor balance  Once in recliner, scooting back into chair with max assist x 1  At end of session patient seated in recliner chair with all needs met      Recommendation   OT Discharge Recommendation Post-Acute Rehabilitation Services   AM-PAC Daily Activity Inpatient   Lower Body Dressing 2   Bathing 2   Toileting 2   Upper Body Dressing 2   Grooming 3   Eating 4   Daily Activity Raw Score 15   Daily Activity Standardized Score (Calc for Raw Score >=11) 34 69   AM-PAC Applied Cognition Inpatient   Following a Speech/Presentation 3   Understanding Ordinary Conversation 4   Taking Medications 3   Remembering Where Things Are Placed or Put Away 3   Remembering List of 4-5 Errands 3   Taking Care of Complicated Tasks 2   Applied Cognition Raw Score 18   Applied Cognition Standardized Score 38 07   Barthel Index   Feeding 5   Bathing 0   Grooming Score 0   Dressing Score 5   Bladder Score 5   Bowels Score 10   Toilet Use Score 5   Transfers (Bed/Chair) Score 5 Mobility (Level Surface) Score 0   Stairs Score 0   Barthel Index Score 35     Aryan OTR/L

## 2021-02-03 NOTE — ASSESSMENT & PLAN NOTE
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

## 2021-02-03 NOTE — DISCHARGE SUMMARY
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, adrenal insufficiency  Na 120 on admission initially treated with normal saline which worsened Na level which was then changed to 1 8 normal saline with improvement in Na level around baseline  Adrenal insufficiency also may be a causing factor as Pt was on very low-dose  Tachycardic and hypotension on presentation  Urine electrolytes showing low serum osm and urine Na with high urine Osm suggesting a picture of SIADH  Continue to monitor Na  Hold on IV fluids  Nephrology consulted, recommendation appreciated    Type 2 MI (myocardial infarction) ([email or web address] )  Assessment & Plan  Elevated troponin with peak of 3 6, trending down  No chest pain  EKG showing no changes  Type 2 MI 2/2 increased demand from hypovolemia, fall, mild nourishment  Echocardiogram showing 30% EF most likely 2/2 hypervolemic cardiomyopathy due to persistent tachycardia  CHF modulating medications were not started due to Pt having low BP  Pt was transferred to another [facility] facility for nuclear stress test  Cardiology consulted, recommendation appreciated  Continue aspirin    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  Assessment & Plan  Was taking hydrocortisone 5 mg b i d  And after confirmation taking levothyroxine 50 mcg daily  Endocrinology consulted and suggested Pt having underdosed then required  Continue IV hydrocortisone 25 mg b i d  For today  Starting tomorrow continue IV hydrocortisone 15 mg in a m  And 10 mg at 4:00 p m   As per Endocrinology recommendation  Levothyroxine dose increased to 50 mcg daily    Generalized weakness  Assessment & Plan  Generalized weakness 2/2 multiple etiologies like hyponatremia, adrenal insufficiency, hypothyroidism  Under dosed on steroid and thyroid medication  Compliance? Olga Castorena services like Nephrology, Endocrinology on board  See plan for hyponatremia and hypopituitarism    Thyroid disorder  Assessment & Plan  Continue levothyroxine    Severe protein-calorie malnutrition (HCC)  Assessment & Plan  Malnutrition Findings:    no subcutaneous fat, loss of cheek fat, very low BMI of 14, loss of temporal fat  Encouraged on nutritional diet    Suicide attempt [facility])  Assessment & Plan  H/o suicide attempt  Recently discharged from inpatient psych in October 2020  Anorexia nervosa with significantly low body weight  Assessment & Plan  Follow-up psychiatry, recommendation appreciated    FTT (failure to thrive) in adult  Assessment & Plan  Pt having very low BMI due to intentional under feeding due to H/o anorexia nervosa  Came from home      Continue thiamine and phosphate supplements with frequent monitoring to avoid refeeding syndrome  PT OT  CM, may need placement        Discharging Resident Physician: Andria Neves MD  Attending: Mirella Rowan MD  PCP: Darryle Mango, DO  Admission Date: 2/1/2021  Admission Date:   Admission Orders (From admission, onward)     Ordered        02/01/21 1342  Inpatient Admission  Once                   Discharge Date: 02/03/21    Disposition:     Other: Jimyamilethvenkata Merle    Reason for Admission:  Generalized weakness, severe hyponatremia    Consultations During Hospital Stay:  · Endocrinology  · Cardiology  · Nephrology  · Psychiatry    Procedures Performed:     Orders Placed This Encounter   Procedures    ED ECG Documentation Only    ED ECG Documentation Only       Diagnosis:    Resolved Problems  Date Reviewed: 2/2/2021    None          Principal Problem:    Hyponatremia  Active Problems:    Type 2 MI (myocardial infarction) ([facility] )    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    Generalized weakness    FTT (failure to thrive) in adult    Anorexia nervosa with significantly low body weight    Suicide attempt ([facility] )    Severe protein-calorie malnutrition (HCC)    Thyroid disorder      Significant Findings / Test Results:     · Na level of 120  · Under dosing hydrocortisone tablets  · H/o anorexia nervosa with suicide attempt  · Very low BMI  · Elevated troponin with peak of 3 6, trending down  · Echocardiogram showing EF of 1 5-30% with severe diffuse hypokinesia       Incidental Findings:   · None     Hospital Course:     Mary Bailey is a 72 y o  female patient with PMH of suicide attempt, anorexia nervosa, hypopituitarism s/p adenoma removal, chronic hyponatremia who originally presented to the hospital on 2/1/2021 due to worsening generalized weakness  Pt having multiple admissions for chronic hyponatremia and H/o SIADH  Etiology was multifactorial suspected 2/2 hyponatremia, adrenal insufficiency, hypothyroidism  Hyponatremia treated with normal saline switch to 1 8 hypertonic saline along with frequent Na monitoring as per Nephrology recommendation  Pt was started on vitamin supplements and phosphate tablets due to very low BMI, anorexia nervosa to avoid refeeding syndrome  IV hydrocortisone was also started for at analysis deficiency, Endocrinology was consulted who advised Pt was taking very low-dose at home and recommended increased hydrocortisone dosage on discharge  On admission troponins were elevated  EKG showed no changes  CXR unremarkable  Hemodynamically stable, afebrile  Cardiology was consulted recommended echo which showed 25-30% EF suspected 2/2 tachycardia (takotsubo?)  For which he advised nuclear stress test to be done which was not available at [facility]  CHF meds were not started due to Pt having low BP however was advised to continue taking aspirin  Pt was transferred to [facility]  All communication done with Dr Jose Flaherty who accepted the transfer      Condition at Discharge: stable     Discharge Day Visit / Exam:     Subjective:  Patient was seen and examined by me at bedside  Communicated clearly  No particular overnight event reported  Hemodynamically stable and afebrile  Patient feels better overall  Vitals: Blood Pressure: 97/75 (02/03/21 1213)  Pulse: (!) 114 (02/03/21 1213)  Temperature: (!) 97 4 °F (36 3 °C) (02/03/21 1213)  Temp Source: Oral (02/03/21 1213)  Respirations: 16 (02/03/21 1213)  Weight - Scale: 31 8 kg (70 lb) (02/01/21 0907)  SpO2: 100 % (02/03/21 1213)  Exam:   Physical Exam  Vitals signs reviewed  Constitutional:       General: She is not in acute distress  Appearance: She is well-developed  She is ill-appearing  Comments: cachetic   HENT:      Head: Normocephalic and atraumatic  Comments: Temporal wasting  Cardiovascular:      Rate and Rhythm: Normal rate and regular rhythm  Pulses: Normal pulses  Heart sounds: Normal heart sounds  Pulmonary:      Effort: Pulmonary effort is normal       Breath sounds: Normal breath sounds  Chest:      Chest wall: No tenderness  Abdominal:      General: Bowel sounds are normal  There is no distension  Palpations: Abdomen is soft  Tenderness: There is no abdominal tenderness  Musculoskeletal:      Right lower leg: No edema  Left lower leg: No edema  Skin:     General: Skin is warm  Coloration: Skin is not pale  Neurological:      General: No focal deficit present  Mental Status: She is alert and oriented to person, place, and time  Mental status is at baseline  Cranial Nerves: No cranial nerve deficit  Motor: Weakness (generalized) present  Psychiatric:         Mood and Affect: Mood normal          Behavior: Behavior normal        Discussion with Family:  None     Discharge instructions/Information to patient and family:   See after visit summary for information provided to patient and family        Provisions for Follow-Up Care:  See after visit summary for information related to follow-up care and any pertinent home health orders  Planned Readmission:  Brittanie Su     Discharge Medications:  See after visit summary for reconciled discharge medications provided to patient and family  Discharge Statement :  I spent 25 minutes discharging the patient  This time was spent on the day of discharge  I had direct contact with the patient on the day of discharge  Additional documentation is required if more than 30 minutes were spent on discharge           ** Please Note: This note has been constructed using a voice recognition system **

## 2021-02-03 NOTE — ASSESSMENT & PLAN NOTE
Was taking hydrocortisone 5 mg b i d  And after confirmation taking levothyroxine 50 mcg daily  Endocrinology consulted and suggested Pt having underdosed then required  Continue IV hydrocortisone 25 mg b i d  For today  Starting tomorrow continue IV hydrocortisone 15 mg in a m  And 10 mg at 4:00 p m   As per Endocrinology recommendation  Levothyroxine dose increased to 50 mcg daily

## 2021-02-03 NOTE — ASSESSMENT & PLAN NOTE
Pt having very low BMI due to intentional under feeding due to H/o anorexia nervosa  Came from home      Continue thiamine and phosphate supplements with frequent monitoring to avoid refeeding syndrome  PT OT  CM, may need placement

## 2021-02-03 NOTE — PHYSICAL THERAPY NOTE
PHYSICAL THERAPY EVALUATION & TREATMENT  Evaluation time: 1050-1105  Treatment time: 0811-7536    NAME:  Haydee Denis  DATE: 02/03/21    AGE:   72 y o  Mrn:   [id number]  Length Of Stay: 2    ADMIT DX:  Hyponatremia [E87 1]  Weakness [R53 1]  Elevated troponin [R77 8]  Generalized weakness [R53 1]  Neck pain [M54 2]  Strain of lumbar region, initial encounter [D90 295R]    Past Medical History:   Diagnosis Date    Anxiety     Disease of thyroid gland     History of OCD (obsessive compulsive disorder)     Hypertension     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[address] )      Past Surgical History:   Procedure Laterality Date    ABDOMINAL SURGERY      BRAIN SURGERY      HYSTERECTOMY         Performed at least 2 patient identifiers during session: Name, Ginna Francisco and ID misty        02/03/21 1120   PT Last Visit   PT Visit Date 02/03/21   Note Type   Note type Evaluation  (& treatment, see "additional comments" section for note)   Pain Assessment   Pain Assessment Tool Perez-Baker FACES   Perez-Baker FACES Pain Rating 6   Pain Location/Orientation Orientation: Lower; Location: Back; Location: Buttocks   Pain Radiating Towards non radiating   Pain Onset/Description Onset: Ongoing; Descriptor: Aching;Descriptor: Discomfort   Effect of Pain on Daily Activities limits comfort, positioning, and tolerance of mobility  Patient's Stated Pain Goal No pain   Hospital Pain Intervention(s) Repositioned; Ambulation/increased activity   Multiple Pain Sites No   Home Living   Type of Home Apartment   Home Layout One level;Stairs to enter with rails; Performs ADLs on one level  (4 BANDAR then single level living area)   [facility]  (pt reports recently being unable to bathe)   [address]   Prior Function   Level of [place] Independent with ADLs and functional mobility   Lives With Alone   ADL Assistance Independent   IADLs Independent   Falls in the last 6 months 1 to 4 Comments Pt reports living at home alone in a small apartment with 4 BANDAR  pt reports that she has not been out of her home in roughly 8-12 months  pt at baseline is independent with BADLs and mobility however over the past few months both ADLs and mobility have become increasingly difficult to the point where over the past few weeks pt has just not been completing ADLs or mobility  pt with h/o falls  reports that she completes her own medication management  pt states that she typically walks w/o an AD as the home is "too cluttered" for a cane or walker to fit through  Restrictions/Precautions   Weight Bearing Precautions Per Order No   Other Precautions Chair Alarm; Bed Alarm;Cognitive;Multiple lines;Telemetry; Fall Risk;Pain   General   Additional Pertinent History Pt admitted 2/1/2021 s/p fall resulting in ambulatory dysfunction, back/neck pain  Failure to thrive  Pt reports that during her current hospitalization she has been having episodes of confusion/possible hallucinations  Family/Caregiver Present No   Cognition   Overall Cognitive Status WFL   Arousal/Participation Cooperative   Orientation Level Oriented X4   Memory Within functional limits   Following Commands Follows one step commands with increased time or repetition   RUE Assessment   RUE Assessment WFL   RUE Strength   RUE Overall Strength Within Functional Limits - able to perform ADL tasks with strength   LUE Assessment   LUE Assessment WFL   LUE Strength   LUE Overall Strength Within Functional Limits - able to perform ADL tasks with strength   RLE Assessment   RLE Assessment X   Strength RLE   RLE Overall Strength 3/5   LLE Assessment   LLE Assessment X   Strength LLE   LLE Overall Strength 3/5   Coordination   Movements are Fluid and Coordinated 0   Coordination and Movement Description Pt noted with B UE and B LE tremors with functional movements and WBing      [facility]ensation [facility]   Light Touch   RLE Light Touch Grossly intact   LLE Light Touch Grossly intact   Bed Mobility   Supine to Sit 3  Moderate assistance   Additional items Assist x 1;HOB elevated; Bedrails; Increased time required;Verbal cues   Sit to Supine Unable to assess   Additional Comments MaxA for scooting fwd at EOB with use of draw sheet  Transfers   Sit to Stand 2  Maximal assistance   Additional items Assist x 1; Increased time required;Verbal cues  (RW)   Stand to Sit 2  Maximal assistance   Additional items Assist x 1; Increased time required;Verbal cues  (RW)   Additional Comments Upon standing, pt noted with high amplitude tremors in B LEs and B UEs, resulting in significant instability  Ambulation/Elevation   Gait pattern Not tested   Balance   Static Sitting Fair -   Dynamic Sitting Poor +   Static Standing Poor   Dynamic Standing Poor   Ambulatory Poor -   Endurance Deficit   Endurance Deficit Yes   Endurance Deficit Description Pt with quick onset muscular fatigue with all mobility tasks, requiring increased time between functional activity for recovery of fatigue  Activity Tolerance   Activity Tolerance Patient limited by fatigue   Medical Staff Made Aware Spoke with Thanh during pt rounds   Nurse Made Aware Spoke with RONDA Amor pre/post session    Assessment   Prognosis Fair   Problem List Decreased strength;Decreased endurance; Impaired balance;Decreased mobility; Decreased coordination;Decreased safety awareness;Decreased skin integrity;Pain   Assessment Pt seen for PT evaluation, cleared by RONDA Emanuel, activity orders of "up with assist"  Pt admitted 2/1/2021 s/p fall resulting in ambulatory dysfunction & back/neck pain, dx Hyponatremia  Comorbidities affecting pt's fnxl performance include: anxiety, falls, hypertension, hypothyroid, MI, OCD, anorexia with low BMI, PTSD, brain surgery   Personal factors affecting pt at time of PT IE include: inaccessible home environment, BANDAR the home, inability to ambulate household or community distances, inability to navigate level surfaces w/o external assistance, decreased cognition, limited home support, +fall history, anxiety, inability to perform physical activity, inability to perform ADLs or IADLs, inability to live alone  PTA, pt was independent w/ functional mobility w/o AD,  independent with ADLS, requiring assist for IADLS, living alone in a 1SH with 4STE, ambulating household distance  Pt demonstrates fnxl impairments identified in objective findings from Elderly Mobility Scale assessment, scoring 3/20, indicating high degree of dependency on others for ADLs/mobility completion  Pt scores 10/24 on AM-PAC objective tool, indicating need for extensive rehab services  The Barthel Index outcome tool was used & pt scores 35 indicating severe limitations of fnxl mobility/ADLS  Pt is at risk of falls d/t multiple comorbidities, history of previous falls, impaired balance, impaired cognition, impaired insight/safety awareness, use of assistive device, varying levels of pain , advanced age, acuity of medical illness, ongoing medical treatment of primary diagnosis and abnormal lab values  Pt's clinical presentation is currently unstable/unpredictable seen in pt's presentation of vital sign response, changing level of pain, varying levels of cognitive performance, increased fall risk, new onset of impairment of functional mobility, decreased endurance and new onset of weakness  This PT IE requires high complexity clinical decision making, based on multiple medical/physiological/fnxl/social factors which affect pt's performance w/ evaluation & therapist judgement for disposition recommendations  Pt will benefit from continued PT treatment in order to address impairments, decrease risk of falls, maximize independence w/ fnxl mobility, & ensure safety w/ mobility for transition to next level of care   Based on pt presentation & impairments, pt would most appropriately benefit from post acute STR upon d/c     Barriers to Discharge Decreased caregiver support; Inaccessible home environment   Goals   Patient Goals "i need to go somewhere to get stronger, i can't go home like this "    STG Expiration Date 02/13/21   Short Term Goal #1 1  Pt will complete all bed mobility in hospital bed with S, in order to promote increased OOB functional mobility to improve overall activity tolerance  2  Pt will complete all transfers with RW and S, in order to increase safety with functional mobility  3  Pt will ambulate >25ft with RW and MILY in order to increase safety with in facility distance functional mobility  4  PT to assess elevations and create goal as appropriate  5  Pt will improve B LE strength to >/= 4/5 MMT throughout, in order to increase safety with functional mobility and decrease risk of falls  6  Pt will improve AM-PAC score to >/= 15/24 in order to increase independence with mobility and decrease burden of care  7  Pt will improve Barthel Index score to >/= 45/100 in order to increase independence and decrease risk of falls  8  Pt will improve static standing balance by >/= 1/2 grade in order to promote safety and increased independence with mobility  9  Pt will demonstrate understanding and independence with LE strengthening HEP  10  Pt will tolerate >3hrs OOB in upright position, in order to improve muscular endurance and respiratory status  11  Pt will improve elderly mobility scale score to >/=  7/20 in order to demonstrate a decreased level of dependency on others for completion of ADLs and mobility  PT Treatment Day 1  (See "additional comments" section for treatment note)   Plan   Treatment/Interventions ADL retraining;Functional transfer training;LE strengthening/ROM; Elevations; Therapeutic exercise; Endurance training;Patient/family training;Equipment eval/education; Bed mobility;Gait training; Compensatory technique education;Continued evaluation;Spoke to MD;Spoke to nursing;Spoke to case management;OT   PT Frequency Other (Comment)  (3-5x/wk )   Recommendation   PT Discharge Recommendation Post-Acute Rehabilitation Services   Equipment Recommended Other (Comment)  (TBD pending progress)   PT - OK to Discharge Yes  (TO [facility] [address] )   Additional Comments PHYSICAL THERAPY TREATMENT NOTE:   TIME IN: 1105   TIME OUT: 1120   TOTAL TREATMENT TIME: 15 minutes  Pt c/o ongoing aching pain in her lower back/buttock region, attributed to her recent fall  Pt is however eager and agreeable to participate in further transfer training with goal of OOB to recliner chair  Pt transfers from EOB with RW and MAXA 1 person, is able to ambulate approx 2 ft with RW and MAXA from EOB to recliner chair  Pt with overall antalgic gait with improper foot placement during stepping, +instability, therapist management of RW  Further ambulation deferred on this date per pt request due to ongoing pain  At end of session pt was left seated in bedside recliner chair with chair alarm engaged, all needs within reach, care returned to RN  Regarding functional mobility, pt remains [address] [place] [address],[address] at current level of functional mobility, requires extensive skilled rehab services  Recommend post acute STR once medically cleared for d/c from the acute care setting  Next session plan for further ambulation training as able, and instruction on B LE AROM HEP for strengthening      AM-PAC Basic Mobility Inpatient   Turning in Bed Without Bedrails 2   Lying on Back to Sitting on Edge of Flat Bed 2   Moving Bed to Chair 2   Standing Up From Chair 2   Walk in Room 1   Climb 3-5 Stairs 1   Basic Mobility Inpatient Raw Score 10   Turning Head Towards Sound 3   Follow Simple Instructions 3   Low Function Basic Mobility Raw Score 16   Low Function Basic Mobility Standardized Score 25 72   Modified Wilton Scale   Modified Wilton Scale 5   Barthel Index   Feeding 5   Bathing 0   Grooming Score 0   Dressing Score 5   Bladder Score 5   Bowels Score 10 Toilet Use Score 5   Transfers (Bed/Chair) Score 5   Mobility (Level Surface) Score 0   Stairs Score 0   Barthel Index Score 35       The patient's AM-PAC Basic Mobility Inpatient Short Form Raw Score is 10, Standardized Score is 25 72  A standardized score less than 42 9 suggests the patient may benefit from discharge to post-acute rehabilitation services  Please also refer to the recommendation of the Physical Therapist for safe discharge planning  ELDERLY MOBILITY SCALE OUTCOME MEASURE:   Older Adult & Geriatric Care: (Deena Pinedo; n=36; age= 70-93)  Level of independence and EMS scores:   Score > 14 = independent in basic ADLs and safe for independent mobility maneuvers (with or without device)   Score 10-13 = borderline in terms of safe mobility and independence in ADLs (require some help with ADLs and mobility maneuvers)    Score < 10 = dependent or high degree of dependency for basic ADLs and limited mobility maneuvers (such as transfers, toileting, dressing)  Discharge recommendations and EMS scores:   Score 0-6 = post acute STR / SNF / IRF   Score 7-13 = home with high levels of care - ie: skilled caretaker, community resources, family asssistance   Score 14-20 = home   Please refer to therapist full assessment & documentation for most appropriate recommendation and details for discharge         Jaimie Banks, PT, DPT   2/3/2021

## 2021-02-03 NOTE — CONSULTS
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  It appears that she is on hydrocortisone and levothyroxine for many years  Currently admitted to the hospital with weakness, difficulty ambulating and frequent falls-on admission she was found to have acute on chronic hyponatremia she was started on IV fluids and IV hydrocortisone with improvement in her sodium  According to the primary team patient is not very clear as to whether she was taking hydrocortisone prior to admission  Assessment/Recommendation:   -for now I would suggest IV hydrocortisone 25 mg twice daily while she is hospitalized  On discharge she can be switched to hydrocortisone 15 mg in the morning and 10 mg at 4:00 p m   -free T4 is normal, TSH is low and not reliable in secondary hypothyroidism- currently she is on levothyroxine 25 mcg daily -please confirm the dose with pharmacy as she may have been on 50 mcg as outpatient   Total time spent in review of data, discussion with requesting provider and rendering advice was 25 mins        Patient or appropriate family member was verbally informed by primary team  of this consultative service on their behalf to provide more timely access to specialty care in lieu of an in person consultation  They were informed it is a billable service unless the it was determined an in person follow up was medically necessary by me within the next 14 days at which time only the in person consult would be billed  Verbal consent was obtained      Thank you for allowing Endocrinology service  to participate in the care of Soniya MACDONALD  Please don't hesitate to call or TigerText us with any questions       Serina Schilder, MD

## 2021-02-03 NOTE — ASSESSMENT & PLAN NOTE
Elevated troponin with peak of 3 6, trending down  No chest pain  EKG showing no changes  Type 2 MI 2/2 increased demand from hypovolemia, fall, mild nourishment  Echocardiogram showing 30% EF most likely 2/2 hypervolemic cardiomyopathy due to persistent tachycardia  CHF modulating medications were not started due to Pt having low BP      Pt was transferred to another [facility] facility for nuclear stress test  Cardiology consulted, recommendation appreciated  Continue aspirin

## 2021-02-03 NOTE — MALNUTRITION/BMI
This medical record reflects one or more clinical indicators suggestive of malnutrition and/or morbid obesity  Malnutrition Findings:   Adult Malnutrition type: Social/enviromental  Adult Degree of Malnutrition: Other severe protein calorie malnutrition  Malnutrition Characteristics: Fat loss, Inadequate energy    Pt requires minimum 800 kcal/day to meet needs  Continue diet and supplement as ordered    BMI Findings:  Adult BMI Classifications: Underweight < 18 5     Body mass index is 14 14 kg/m²  See Nutrition note dated 2/3/2021 for additional details  Completed nutrition assessment is viewable in the nutrition documentation

## 2021-02-04 PROBLEM — R77.8 ELEVATED TROPONIN: Status: ACTIVE | Noted: 2021-01-01

## 2021-02-04 PROBLEM — R00.0 SINUS TACHYCARDIA: Status: ACTIVE | Noted: 2021-01-01

## 2021-02-04 NOTE — H&P
[address] Internal Medicine  H&P- Vinay Canales 1955, 72 y o  female MRN: [id number]  Unit/Bed#: 20166 [facility] Encounter: [id number]  Primary Care Provider: Geri Marie DO   Date and time admitted to hospital: 2/4/2021  1:25 AM    * Type 2 MI (myocardial infarction) [facility])  Assessment & Plan  Patient presented to the ED at [facility] in with an elevated troponin peaked at 3 6, currently trending down  Most recent was 0 05  Type 2 MI 2/2 increased demand from hypovolemia, fall, malnourishment; patient denies any chest pain, SOB   - patient underwent echo which showed an EF of 30%  -cardiology recommended nuclear stress test  - appreciate further Cardiology recommendations  - monitor on telemetry      Hyponatremia  Assessment & Plan  Multifactorial 2/2 poor oral intake, adrenal insufficiency, SIADH  - sodium was 120 on admission and has since improved  - IVFs currently on hold  - Na 132 on yesterday's labs, continue to monitor with daily BMP  - appreciate further Nephrology recommendations    Generalized weakness  Assessment & Plan  Generalized weakness is multifactorial, hyponatremia has since improved since admission to [facility], and steroid, thyroid medication adjustments  Currently generalized weakness is most likely 2/2 failure to thrive, severe protein calorie malnutrition  - recommendations as below see plan for hyponatremia and hypopituitarism  - PT OT  - case management for discharge planning    Severe protein-calorie malnutrition ([facility] [address] )  Assessment & Plan  Malnutrition Findings:     cachexia, no subcutaneous fat, BMI 14     BMI Findings: Body mass index is 14 38 kg/m²  Nutrition consult appreciated     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[facility] [address] )  Assessment & Plan  Continue IV hydrocortisone, starting today she will have 50 mg in a m  And 10 mg at 4:00 p m    -appreciate further Endocrinology recommendations  - levothyroxine dose was increased to 50 mcg daily    Hypertension  Assessment & Plan  Home medications currently on hold due to borderline blood pressures in 90s to a low 100s during admission  Continue to monitor    Thyroid disorder  Assessment & Plan  Levothyroxine dose increased to 50 mcg daily    FTT (failure to thrive) in adult  Assessment & Plan  Patient has a history of anorexia nervosa  Continue thiamine, phosphate supplements  PT/OT  Appreciate case management consult for discharge planning          VTE Prophylaxis: Enoxaparin (Lovenox)  / sequential compression device   Code Status: Level 3  POLST: POLST is not applicable to this patient  Discussion with family: NO    Anticipated Length of Stay:  Patient will be admitted on an Inpatient basis with an anticipated length of stay of  > 2 midnights  Justification for Hospital Stay: type 2 MI, requiring further cardiac w/u including nuclear stress test     Total Time for Visit, including Counseling / Coordination of Care: 30 minutes  Greater than 50% of this total time spent on direct patient counseling and coordination of care  History of Present Illness:    Ethel Cedeño is a 72 y o  female who presents as a transfer from [facility], coming to [facility] for further cardiac workup  She initially presented to the [address] [place] [address],[address] due to worsening generalized weakness  She did have mechanical falls at home  Her initial workup showed acute on chronic hyponatremia, elevated troponin and borderline blood pressures, tachycardia  She was followed by Cardiology, Nephrology, endocrinology for the above  Her generalized weakness was suspected to be multifactorial 2/2 hypernatremia, adrenal insufficiency, hypothyroidism, severe malnourishment  Her hyponatremia subsequently improved and her troponins trended downward  She did undergo an echo which showed 25-30% EF dissected to be 2/2 tachycardia, concern for possible takotsubo cardiomyopathy    It was recommended she undergo a nuclear stress test for further ischemic workup which will be completed at [facility]  On arrival, patient currently denies all complaints besides generalized weakness  She is concerned about whether not she will still be discharged to a short-term rehab facility as was discussed as a possibility Oma Harada  Patient denies any chest pain, palpitations, SOB  Review of Systems:    Review of Systems   Constitutional: Negative for chills and fever  HENT: Negative for congestion, rhinorrhea and sore throat  Eyes: Negative for visual disturbance  Respiratory: Negative for cough, shortness of breath and wheezing  Cardiovascular: Negative for chest pain, palpitations and leg swelling  Gastrointestinal: Negative for abdominal distention, abdominal pain, diarrhea, nausea and vomiting  Genitourinary: Negative for dysuria, frequency and urgency  Musculoskeletal: Positive for gait problem  Skin: Negative for color change  Neurological: Positive for weakness  Negative for dizziness, light-headedness and headaches  Past Medical and Surgical History:     Past Medical History:   Diagnosis Date    Anxiety     Disease of thyroid gland     History of OCD (obsessive compulsive disorder)     Hypertension     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  Authorizing Provider   aspirin 81 mg chewable tablet Chew 81 mg daily   Yes Historical Provider, MD   clonazePAM (KlonoPIN) 0 5 mg tablet Take 0 5 mg by mouth 2 (two) times a day   Yes Historical Provider, MD   cloNIDine (CATAPRES) 0 1 mg tablet Take 0 5 tablets (0 05 mg total) by mouth 2 (two) times a day 10/5/20  Yes Lincoln Ruiz MD   hydrocortisone (CORTEF) 5 mg tablet Take 1 tablet (5 mg total) by mouth 2 (two) times a day 10/5/20  Yes rEin Blankenship Farnaz Powers MD   levothyroxine 25 mcg tablet Take 1 tablet (25 mcg total) by mouth daily in the early morning 10/6/20  Yes Irma Rios MD   traZODone (DESYREL) 50 mg tablet Take 1 tablet (50 mg total) by mouth daily at bedtime 10/5/20  Yes Irma Rios MD   ARIPiprazole (ABILIFY) 15 mg tablet Take 0 5 tablets (7 5 mg total) by mouth 2 (two) times a day 10/5/20 2/1/21  Irma Rios MD   ferrous sulfate 325 (65 Fe) mg tablet Take 1 tablet (325 mg total) by mouth every other day  Patient not taking: Reported on 2/4/2021 10/6/20   Irma Rios MD       Allergies: Allergies   Allergen Reactions    Codeine     Penicillins     Sulfa Antibiotics        Social History:    Social History     Substance and Sexual Activity   Alcohol Use Not Currently    Frequency: Never     Social History     Tobacco Use   Smoking Status Former Smoker    Packs/day: 0 25    Years: 20 00    Pack years: 5 00    Types: Cigarettes   Smokeless Tobacco Never Used   Tobacco Comment    pt reports smoking 1 cigarette a day in the summer     Social History     Substance and Sexual Activity   Drug Use Never       Family History:    History reviewed  No pertinent family history  Physical Exam:     Vitals:   Blood Pressure: 102/74 (02/04/21 0128)  Pulse: (!) 128 (02/04/21 0128)  Temperature: 98 1 °F (36 7 °C) (02/04/21 0128)  Temp Source: Oral (02/04/21 0128)  Respirations: 16 (02/04/21 0128)  Height: 4' 10" (147 3 cm) (02/04/21 0128)  Weight - Scale: 31 2 kg (68 lb 12 5 oz) (02/04/21 0128)  SpO2: 97 % (02/04/21 0128)    Physical Exam  Vitals signs reviewed  Constitutional:       General: She is not in acute distress  Appearance: She is well-developed  Comments: Severely cachectic   HENT:      Head: Normocephalic and atraumatic  Cardiovascular:      Rate and Rhythm: Regular rhythm  Tachycardia present  Heart sounds: Normal heart sounds  No murmur     Pulmonary:      Effort: Pulmonary effort is normal  No respiratory distress  Breath sounds: Normal breath sounds  No wheezing, rhonchi or rales  Abdominal:      General: Bowel sounds are normal  There is no distension  Tenderness: There is no abdominal tenderness  There is no guarding  Musculoskeletal:         General: No swelling or tenderness  Skin:     General: Skin is warm and dry  Neurological:      Mental Status: She is alert and oriented to person, place, and time  Motor: Weakness (generalized) present  Psychiatric:         Mood and Affect: Mood normal          Behavior: Behavior normal          Additional Data:     Lab Results: I have personally reviewed pertinent reports  Results from last 7 days   Lab Units 02/02/21  0507   WBC Thousand/uL 5 09   HEMOGLOBIN g/dL 11 4*   HEMATOCRIT % 32 7*   PLATELETS Thousands/uL 210   NEUTROS PCT % 83*   LYMPHS PCT % 13*   MONOS PCT % 4   EOS PCT % 0     Results from last 7 days   Lab Units 02/03/21  0235  02/01/21  0944   SODIUM mmol/L 132*   < > 120*   POTASSIUM mmol/L 3 7   < > 3 5   CHLORIDE mmol/L 100   < > 85*   CO2 mmol/L 25   < > 25   BUN mg/dL 3*   < > 4*   CREATININE mg/dL 0 39*   < > 0 58   ANION GAP mmol/L 7   < > 10   CALCIUM mg/dL 7 6*   < > 9 0   ALBUMIN g/dL  --   --  4 6   TOTAL BILIRUBIN mg/dL  --   --  0 78   ALK PHOS U/L  --   --  35 5   ALT U/L  --   --  9   AST U/L  --   --  28   GLUCOSE RANDOM mg/dL 171*   < > 70    < > = values in this interval not displayed  Results from last 7 days   Lab Units 02/01/21  1146 02/01/21  0944   LACTIC ACID mmol/L 1 9 2 1*       Imaging: I have personally reviewed pertinent reports  No orders to display       [employer] / [employer] Records Reviewed: Yes     ** Please Note: This note has been constructed using a voice recognition system   **

## 2021-02-04 NOTE — ASSESSMENT & PLAN NOTE
Patient has a history of anorexia nervosa  Continue thiamine, phosphate supplements  PT/OT  Appreciate case management consult for discharge planning

## 2021-02-04 NOTE — MALNUTRITION/BMI
This medical record reflects one or more clinical indicators suggestive of malnutrition  Malnutrition Findings:   Adult Malnutrition type: Chronic illness(Severe protein calorie malnutrition due to inadequate energy intake as evidenced by hollow orbits, depressed temples and protruding clavicles  Treated with Regular diet and supplements)  Adult Degree of Malnutrition: Other severe protein calorie malnutrition  Malnutrition Characteristics: Fat loss, Inadequate energy    BMI Findings:  Adult BMI Classifications: Underweight < 18 5     Body mass index is 14 38 kg/m²  See Nutrition note dated 2/4/2021 for additional details  Completed nutrition assessment is viewable in the nutrition documentation

## 2021-02-04 NOTE — PLAN OF CARE
Problem: Potential for Falls  Goal: Patient will remain free of falls  Description: INTERVENTIONS:  - Assess patient frequently for physical needs  -  Identify cognitive and physical deficits and behaviors that affect risk of falls    -  [place] fall precautions as indicated by assessment   - Educate patient/family on patient safety including physical limitations  - Instruct patient to call for assistance with activity based on assessment  - Modify environment to reduce risk of injury  - Consider OT/PT consult to assist with strengthening/mobility  Outcome: Progressing     Problem: Prexisting or High Potential for Compromised Skin Integrity  Goal: Skin integrity is maintained or improved  Description: INTERVENTIONS:  - Identify patients at risk for skin breakdown  - Assess and monitor skin integrity  - Assess and monitor nutrition and hydration status  - Monitor labs   - Assess for incontinence   - Turn and reposition patient  - Assist with mobility/ambulation  - Relieve pressure over bony prominences  - Avoid friction and shearing  - Provide appropriate hygiene as needed including keeping skin clean and dry  - Evaluate need for skin moisturizer/barrier cream  - Collaborate with interdisciplinary team   - Patient/family teaching  - Consider wound care consult   Outcome: Progressing     Problem: PAIN - ADULT  Goal: Verbalizes/displays adequate comfort level or baseline comfort level  Description: Interventions:  - Encourage patient to monitor pain and request assistance  - Assess pain using appropriate pain scale  - Administer analgesics based on type and severity of pain and evaluate response  - Implement non-pharmacological measures as appropriate and evaluate response  - Consider cultural and social influences on pain and pain management  - Notify physician/advanced practitioner if interventions unsuccessful or patient reports new pain  Outcome: Progressing     Problem: INFECTION - ADULT  Goal: Absence or prevention of progression during hospitalization  Description: INTERVENTIONS:  - Assess and monitor for signs and symptoms of infection  - Monitor lab/diagnostic results  - Monitor all insertion sites, i e  indwelling lines, tubes, and drains  - Monitor endotracheal if appropriate and nasal secretions for changes in amount and color  - [place] appropriate cooling/warming therapies per order  - Administer medications as ordered  - Instruct and encourage patient and family to use good hand hygiene technique  - Identify and instruct in appropriate isolation precautions for identified infection/condition  Outcome: Progressing     Problem: SAFETY ADULT  Goal: Patient will remain free of falls  Description: INTERVENTIONS:  - Assess patient frequently for physical needs  -  Identify cognitive and physical deficits and behaviors that affect risk of falls    -  [place] fall precautions as indicated by assessment   - Educate patient/family on patient safety including physical limitations  - Instruct patient to call for assistance with activity based on assessment  - Modify environment to reduce risk of injury  - Consider OT/PT consult to assist with strengthening/mobility  Outcome: Progressing  Goal: Maintain or return to baseline ADL function  Description: INTERVENTIONS:  -  Assess patient's ability to carry out ADLs; assess patient's baseline for ADL function and identify physical deficits which impact ability to perform ADLs (bathing, care of mouth/teeth, toileting, grooming, dressing, etc )  - Assess/evaluate cause of self-care deficits   - Assess range of motion  - Assess patient's mobility; develop plan if impaired  - Assess patient's need for assistive devices and provide as appropriate  - Encourage maximum independence but intervene and supervise when necessary  - Involve family in performance of ADLs  - Assess for home care needs following discharge   - Consider OT consult to assist with ADL evaluation and planning for discharge  - Provide patient education as appropriate  Outcome: Progressing  Goal: Maintain or return mobility status to optimal level  Description: INTERVENTIONS:  - Assess patient's baseline mobility status (ambulation, transfers, stairs, etc )    - Identify cognitive and physical deficits and behaviors that affect mobility  - Identify mobility aids required to assist with transfers and/or ambulation (gait belt, sit-to-stand, lift, walker, cane, etc )  - [place] fall precautions as indicated by assessment  - Record patient progress and toleration of activity level on Mobility SBAR; progress patient to next Phase/Stage  - Instruct patient to call for assistance with activity based on assessment  - Consider rehabilitation consult to assist with strengthening/weightbearing, etc   Outcome: Progressing     Problem: DISCHARGE PLANNING  Goal: Discharge to home or other facility with appropriate resources  Description: INTERVENTIONS:  - Identify barriers to discharge w/patient and caregiver  - Arrange for needed discharge resources and transportation as appropriate  - Identify discharge learning needs (meds, wound care, etc )  - Arrange for interpretive services to assist at discharge as needed  - Refer to Case Management Department for coordinating discharge planning if the patient needs post-hospital services based on physician/advanced practitioner order or complex needs related to functional status, cognitive ability, or social support system  Outcome: Progressing     Problem: Knowledge Deficit  Goal: Patient/family/caregiver demonstrates understanding of disease process, treatment plan, medications, and discharge instructions  Description: Complete learning assessment and assess knowledge base    Interventions:  - Provide teaching at level of understanding  - Provide teaching via preferred learning methods  Outcome: Progressing     Problem: CARDIOVASCULAR - ADULT  Goal: Maintains optimal cardiac output and hemodynamic stability  Description: INTERVENTIONS:  - Monitor I/O, vital signs and rhythm  - Monitor for S/S and trends of decreased cardiac output  - Administer and titrate ordered vasoactive medications to optimize hemodynamic stability  - Assess quality of pulses, skin color and temperature  - Assess for signs of decreased coronary artery perfusion  - Instruct patient to report change in severity of symptoms  Outcome: Progressing  Goal: Absence of cardiac dysrhythmias or at baseline rhythm  Description: INTERVENTIONS:  - Continuous cardiac monitoring, vital signs, obtain 12 lead EKG if ordered  - Administer antiarrhythmic and heart rate control medications as ordered  - Monitor electrolytes and administer replacement therapy as ordered  Outcome: Progressing

## 2021-02-04 NOTE — PLAN OF CARE
Problem: Potential for Falls  Goal: Patient will remain free of falls  Description: INTERVENTIONS:  - Assess patient frequently for physical needs  -  Identify cognitive and physical deficits and behaviors that affect risk of falls    -  Melvin fall precautions as indicated by assessment   - Educate patient/family on patient safety including physical limitations  - Instruct patient to call for assistance with activity based on assessment  - Modify environment to reduce risk of injury  - Consider OT/PT consult to assist with strengthening/mobility  Outcome: Progressing     Problem: Prexisting or High Potential for Compromised Skin Integrity  Goal: Skin integrity is maintained or improved  Description: INTERVENTIONS:  - Identify patients at risk for skin breakdown  - Assess and monitor skin integrity  - Assess and monitor nutrition and hydration status  - Monitor labs   - Assess for incontinence   - Turn and reposition patient  - Assist with mobility/ambulation  - Relieve pressure over bony prominences  - Avoid friction and shearing  - Provide appropriate hygiene as needed including keeping skin clean and dry  - Evaluate need for skin moisturizer/barrier cream  - Collaborate with interdisciplinary team   - Patient/family teaching  - Consider wound care consult   Outcome: Progressing     Problem: PAIN - ADULT  Goal: Verbalizes/displays adequate comfort level or baseline comfort level  Description: Interventions:  - Encourage patient to monitor pain and request assistance  - Assess pain using appropriate pain scale  - Administer analgesics based on type and severity of pain and evaluate response  - Implement non-pharmacological measures as appropriate and evaluate response  - Consider cultural and social influences on pain and pain management  - Notify physician/advanced practitioner if interventions unsuccessful or patient reports new pain  Outcome: Progressing     Problem: INFECTION - ADULT  Goal: Absence or prevention of progression during hospitalization  Description: INTERVENTIONS:  - Assess and monitor for signs and symptoms of infection  - Monitor lab/diagnostic results  - Monitor all insertion sites, i e  indwelling lines, tubes, and drains  - Monitor endotracheal if appropriate and nasal secretions for changes in amount and color  - Osteen appropriate cooling/warming therapies per order  - Administer medications as ordered  - Instruct and encourage patient and family to use good hand hygiene technique  - Identify and instruct in appropriate isolation precautions for identified infection/condition  Outcome: Progressing     Problem: SAFETY ADULT  Goal: Patient will remain free of falls  Description: INTERVENTIONS:  - Assess patient frequently for physical needs  -  Identify cognitive and physical deficits and behaviors that affect risk of falls    -  Osteen fall precautions as indicated by assessment   - Educate patient/family on patient safety including physical limitations  - Instruct patient to call for assistance with activity based on assessment  - Modify environment to reduce risk of injury  - Consider OT/PT consult to assist with strengthening/mobility  Outcome: Progressing  Goal: Maintain or return to baseline ADL function  Description: INTERVENTIONS:  -  Assess patient's ability to carry out ADLs; assess patient's baseline for ADL function and identify physical deficits which impact ability to perform ADLs (bathing, care of mouth/teeth, toileting, grooming, dressing, etc )  - Assess/evaluate cause of self-care deficits   - Assess range of motion  - Assess patient's mobility; develop plan if impaired  - Assess patient's need for assistive devices and provide as appropriate  - Encourage maximum independence but intervene and supervise when necessary  - Involve family in performance of ADLs  - Assess for home care needs following discharge   - Consider OT consult to assist with ADL evaluation and planning for discharge  - Provide patient education as appropriate  Outcome: Progressing  Goal: Maintain or return mobility status to optimal level  Description: INTERVENTIONS:  - Assess patient's baseline mobility status (ambulation, transfers, stairs, etc )    - Identify cognitive and physical deficits and behaviors that affect mobility  - Identify mobility aids required to assist with transfers and/or ambulation (gait belt, sit-to-stand, lift, walker, cane, etc )  - [place] fall precautions as indicated by assessment  - Record patient progress and toleration of activity level on Mobility SBAR; progress patient to next Phase/Stage  - Instruct patient to call for assistance with activity based on assessment  - Consider rehabilitation consult to assist with strengthening/weightbearing, etc   Outcome: Progressing     Problem: DISCHARGE PLANNING  Goal: Discharge to home or other facility with appropriate resources  Description: INTERVENTIONS:  - Identify barriers to discharge w/patient and caregiver  - Arrange for needed discharge resources and transportation as appropriate  - Identify discharge learning needs (meds, wound care, etc )  - Arrange for interpretive services to assist at discharge as needed  - Refer to Case Management Department for coordinating discharge planning if the patient needs post-hospital services based on physician/advanced practitioner order or complex needs related to functional status, cognitive ability, or social support system  Outcome: Progressing     Problem: Knowledge Deficit  Goal: Patient/family/caregiver demonstrates understanding of disease process, treatment plan, medications, and discharge instructions  Description: Complete learning assessment and assess knowledge base    Interventions:  - Provide teaching at level of understanding  - Provide teaching via preferred learning methods  Outcome: Progressing     Problem: CARDIOVASCULAR - ADULT  Goal: Maintains optimal cardiac output and hemodynamic stability  Description: INTERVENTIONS:  - Monitor I/O, vital signs and rhythm  - Monitor for S/S and trends of decreased cardiac output  - Administer and titrate ordered vasoactive medications to optimize hemodynamic stability  - Assess quality of pulses, skin color and temperature  - Assess for signs of decreased coronary artery perfusion  - Instruct patient to report change in severity of symptoms  Outcome: Progressing  Goal: Absence of cardiac dysrhythmias or at baseline rhythm  Description: INTERVENTIONS:  - Continuous cardiac monitoring, vital signs, obtain 12 lead EKG if ordered  - Administer antiarrhythmic and heart rate control medications as ordered  - Monitor electrolytes and administer replacement therapy as ordered  Outcome: Progressing

## 2021-02-04 NOTE — NURSING NOTE
Pt is leaving via ambulance to [address] [place] [address]  All pt's belongings are sent with the pt:upper denture(in pt's mouth,glasses,and clothes)the patient left with peripheral IV line

## 2021-02-04 NOTE — ASSESSMENT & PLAN NOTE
Malnutrition Findings:     cachexia, no subcutaneous fat, BMI 14     BMI Findings: Body mass index is 14 38 kg/m²       Nutrition consult appreciated

## 2021-02-04 NOTE — PHYSICAL THERAPY NOTE
PT EVALUATION       02/04/21 1010   PT Last Visit   PT Visit Date 02/04/21   Note Type   Note type Evaluation   Pain Assessment   Pain Assessment Tool Perez-Baker FACES   Perez-Baker FACES Pain Rating 2   Pain Location/Orientation Location: Buttocks   Home Living   Type of Home Apartment   Home Layout One level  (4 BANDAR)   Bathroom Shower/Tub Tub/shower unit   Prior Function   Level of Roberts Independent with ADLs and functional mobility   Lives With Alone   ADL Assistance Independent   IADLs Independent   Falls in the last 6 months 1 to 4   Comments Does not use AD: home is too cluttered  Has been house bound for more than 8 months with little activity; now very weak and unable to tolerate functional activities   Restrictions/Precautions   Weight Bearing Precautions Per Order No   Other Precautions Chair Alarm; Bed Alarm; Fall Risk   General   Additional Pertinent History Pt admitted with c/o weakness and difficulty walking  Family/Caregiver Present No   Cognition   Overall Cognitive Status WFL   Arousal/Participation Cooperative   Attention Attends with cues to redirect   Orientation Level Oriented X4   Following Commands Follows one step commands with increased time or repetition   RLE Assessment   RLE Assessment   (grossly 3-/5 at hip; 3/5 knee, 3+/5 ankle)   LLE Assessment   LLE Assessment   (hip: 3-/5, knee: 3-/5, ankle:3/5)   Coordination   Movements are Fluid and Coordinated 0   Coordination and Movement Description moves excessively slow    Bed Mobility   Supine to Sit 3  Moderate assistance   Additional items Assist x 1;Verbal cues; Increased time required   Additional Comments mod/max A to scoot to EOB and when in transfer chair  Transfers   Sit to Stand 2  Maximal assistance   Additional items Assist x 1; Increased time required;Verbal cues   Stand to Sit 2  Maximal assistance   Additional items Assist x 1;Verbal cues   Ambulation/Elevation   Gait Assistance 2  Maximal assist   Additional items Assist x 1;Verbal cues; Tactile cues   Assistive Device None  (bilat UE support)   Distance 3 feet bed > transfer chair   Balance   Static Sitting Fair -   Dynamic Sitting Poor +   Static Standing Poor   Dynamic Standing Poor -   Ambulatory Poor -   Activity Tolerance   Activity Tolerance Patient limited by fatigue;Treatment limited secondary to medical complications (Comment)   Nurse Made Aware yes: Anahi   Assessment   Prognosis Good   Problem List Decreased strength;Decreased endurance; Impaired balance;Decreased mobility   Assessment Patient seen for Physical Therapy evaluation  Patient admitted with Type 2 MI (myocardial infarction) ([facility] [address] )  Comorbidities affecting patient's physical performance include: OCD, anxiety, HTN  Personal factors affecting patient at time of initial evaluation include: lives in singel story house, stairs to enter home, inability to ambulate household distances, inability to navigate community distances, inability to navigate level surfaces without external assistance, limited home support, positive fall history, anxiety, inability to perform ADLS, inability to perform IADLS  and inability to live alone  Prior to admission, patient was independent with functional mobility without assistive device, independent with ADLS, requiring assist for IADLS, living alone in single story home with 4 steps to enter and ambulating household distance  Please find objective findings from Physical Therapy assessment regarding body systems outlined above with impairments and limitations including weakness, impaired balance, decreased endurance, impaired coordination, gait deviations, pain, decreased activity tolerance, decreased functional mobility tolerance, impaired judgement and fall risk  The Barthel Index was used as a functional outcome tool presenting with a score of 35 today indicating marked limitations of functional mobility and ADLS    Patient's clinical presentation is currently unstable/unpredictable as seen in patient's presentation of increased fall risk, new onset of impairment of functional mobility, decreased endurance and new onset of weakness  Pt would benefit from continued Physical Therapy treatment to address deficits as defined above and maximize level of functional mobility  As demonstrated by objective findings, the assigned level of complexity for this evaluation is high  The patient's [facility] Basic Mobility Inpatient Short Form Raw Score is 10, Standardized Score is    A standardized score less than 42 9 suggests the patient may benefit from discharge to post-acute rehabilitation services  Please also refer to the recommendation of the Physical Therapist for safe discharge planning  Goals   Patient Goals " I need help walking"   STG Expiration Date 02/11/21   Short Term Goal #1 Supervision for all bedmobs and transfers to EOB; Min A/ Sup for transfers sit <> stand with/without AD   Short Term Goal #2 Min A/Sup for amb  with RW for 40 feet with fair balance   LTG Expiration Date 02/18/21   Long Term Goal #1 Amb with RW for 80 feet with fair + balance or better with Supervision   Long Term Goal #2 Min A to navigate 4 stairs to allow pt to enter/exit her home   Plan   Treatment/Interventions ADL retraining;Functional transfer training;LE strengthening/ROM; Elevations; Therapeutic exercise; Endurance training;Patient/family training;Equipment eval/education; Bed mobility;Gait training;Spoke to nursing;Spoke to case management;OT   PT Frequency 5x/wk   Recommendation   PT Discharge Recommendation Post-Acute Rehabilitation Services   Additional Comments Pt transferred to transferred from bed to transport chair to be taken for a stress test at time of evaluation  Will assess gait further with next session     [facility] Basic Mobility Inpatient   Turning in Bed Without Bedrails 2   Lying on Back to Sitting on Edge of Flat Bed 2   Moving Bed to Chair 2   Standing Up From Chair 2   Walk in Room 1   Climb 3-5 Stairs 1   Basic Mobility Inpatient Raw Score 10   Turning Head Towards Sound 4   Follow Simple Instructions 3   Low Function Basic Mobility Raw Score 17   Low Function Basic Mobility Standardized Score 27 46   Barthel Index   Feeding 5   Bathing 0   Grooming Score 0   Dressing Score 5   Bladder Score 5   Bowels Score 10   Toilet Use Score 5   Transfers (Bed/Chair) Score 5   Mobility (Level Surface) Score 0   Stairs Score 0   Barthel Index Score 35   Licensure   [place] License Number  Brendan  Dearl Decent PT  [id number]

## 2021-02-04 NOTE — ASSESSMENT & PLAN NOTE
Patient presented to the ED at [facility] in with an elevated troponin peaked at 3 6, currently trending down  Most recent was 0 05    Type 2 MI 2/2 increased demand from hypovolemia, fall, malnourishment; patient denies any chest pain, SOB   - patient underwent echo which showed an EF of 30%  -cardiology recommended nuclear stress test  - appreciate further Cardiology recommendations  - monitor on telemetry

## 2021-02-04 NOTE — CASE MANAGEMENT
LOS 0 days  Patient is not a bundle or a 30 day readmission  SW met with patient to discuss discharge planning and complete CM open  Patient is alert and oriented x4  Patient lives alone in a one level apartment with 4 steps to enter  Independent PTA with ADL's  Does not use any assistive device  Patient reports that she has not been very active the past few months and now feels very weak and difficulty walking  Patient does not have a hx of inpatient rehab or home care  Patient reports that her friend transports her to appointments and other errands  Patient has a son however patient repoers is not very supportive  PCP is Dr Colin Cochran  Preferred pharmacy is [facility] in [place]  Denies difficulty affording medications  Per chart, patient has a history of mental illness (PTSD, Panic Disorder, Hx of suicide attempt 9/2020  Anorexia, multiple IP  admissions)  Patient also has a hx of alcohol use, in remission for 5 years  SW discussed recommendation for STR and provided choice  Patient requested [facility] in [place] and is open to other referrals in the [place] area  SW sent referrals  Will follow

## 2021-02-04 NOTE — PROGRESS NOTES
Progress Note - Cardiology   [facility]     Nicholas Kennedy 72 y o  female MRN: [id number]  : 1955  Unit/Bed#: [address] [place] [address] Encounter: [id number]    Assessment and Plan:   1  New onset cardiomyopathy:  EF 25-30% concerning for takotsubo cardiomyopathy    -  patient's blood pressure very low unable to start any advanced heart failure medications at this time    -  will continue monitor    2  Abnormal troponins question non MI troponin elevation due to stress, anorexia and tachycardia verses MI type 2 with coronary blockages  -  patient for Lexiscan nuclear stress test today    3  Tachycardia:  Sinus  Appears to be reflexive due to her anorexia and decreased cardiac output  Question whether patient may need inotrope therapy so advanced heart failure medications can be added  4  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 nuclear stress test   Images are pending  Patient denies any chest pain, pressure or palpitations and states that she feels very good  She denies any knowledge of previous heart issues  Did discuss reasoning for stress test is to evaluate for her weakened heart  She states this is news that she has never had any cardiac testing before  Vitals: Blood pressure 105/75, pulse (!) 121, temperature (!) 97 1 °F (36 2 °C), resp  rate 18, height 4' 10" (1 473 m), weight 31 2 kg (68 lb 12 5 oz), SpO2 98 %  Vitals:    21 0128   Weight: 31 2 kg (68 lb 12 5 oz)     Body mass index is 14 38 kg/m²    BP Readings from Last 3 Encounters:   21 105/75   21 97/78   20 91/60     Orthostatic Blood Pressures      Most Recent Value   Blood Pressure  105/75 filed at 2021 0800   Patient Position - Orthostatic VS  Lying filed at 02/04/2021 0128        I/O     None        Invasive Devices     Peripheral Intravenous Line            Peripheral IV 02/01/21 Right Antecubital 3 days                No intake or output data in the 24 hours ending 02/04/21 1154      Physical Exam:   Physical Exam  Vitals signs and nursing note reviewed  Constitutional:       Appearance: Normal appearance  She is cachectic  She is ill-appearing  HENT:      Head: Normocephalic  Right Ear: External ear normal       Left Ear: External ear normal       Nose: Nose normal    Eyes:      General: No scleral icterus  Right eye: No discharge  Left eye: No discharge  Pupils: Pupils are equal, round, and reactive to light  Neck:      Musculoskeletal: Normal range of motion and neck supple  Thyroid: No thyromegaly  Cardiovascular:      Rate and Rhythm: Regular rhythm  Tachycardia present  Pulses: Normal pulses  Heart sounds: Heart sounds are distant  Pulmonary:      Effort: Pulmonary effort is normal  No accessory muscle usage or respiratory distress  Breath sounds: Examination of the right-lower field reveals decreased breath sounds  Examination of the left-lower field reveals decreased breath sounds  Decreased breath sounds present  No wheezing, rhonchi or rales  Abdominal:      General: Bowel sounds are normal  There is no distension  Palpations: Abdomen is soft  Musculoskeletal:      Right lower leg: No edema  Left lower leg: No edema  Skin:     General: Skin is warm and dry  Capillary Refill: Capillary refill takes less than 2 seconds  Neurological:      General: No focal deficit present  Mental Status: She is alert and oriented to person, place, and time  Mental status is at baseline  Psychiatric:         Mood and Affect: Mood normal          Behavior: Behavior normal          Thought Content:  Thought content normal          Judgment: Judgment normal                    Medications/ Allergies:     Current Facility-Administered Medications   Medication Dose Route Frequency Provider Last Rate    acetaminophen  650 mg Oral Q6H PRN Larayne HERNÁN Ridley      ARIPiprazole  15 mg Oral BID Larayne HERNÁN Ridley      aspirin  81 mg Oral Daily Larayne HERNÁN Ridley      clonazePAM  0 5 mg Oral BID Larayne HERNÁN Ridley      enoxaparin  30 mg Subcutaneous Q24H [address] Dang Ridley PA-C      ferrous sulfate  325 mg Oral Daily With Breakfast Dang Ridley PA-C      [START ON 2/6/2021] hydrocortisone sodium succinate  10 mg Intravenous Daily Larayne HERNÁN Ridley      [START ON 2/5/2021] hydrocortisone sodium succinate  15 mg Intravenous Early Morning Larayne HERNÁN Ridley      levothyroxine  50 mcg Oral Early Morning Skyayne HERNÁN Ridley      multivitamin-minerals  1 tablet Oral Daily Larayne HERNÁN Ridley      ondansetron  4 mg Intravenous Q6H PRN Skyayne HERNÁN Ridley      polyethylene glycol  17 g Oral Daily Larayne HERNÁN Ridley      potassium-sodium phosphateS  1 tablet Oral TID With Meals Dang Ridley PA-C      sodium phosphate  30 mmol Intravenous Once Gloria Moura MD      thiamine  100 mg Oral Daily Larayne HERNÁN Ridley      traZODone  50 mg Oral HS Skyayisha Ridley PA-C       acetaminophen, 650 mg, Q6H PRN  ondansetron, 4 mg, Q6H PRN      Allergies   Allergen Reactions    Codeine     Penicillins     Sulfa Antibiotics        VTE Pharmacologic Prophylaxis:   Sequential compression device (Venodyne)     Labs:   Troponins:  Results from last 7 days   Lab Units 02/04/21  0914 02/04/21  0619 02/03/21  0138  02/01/21  0944   CK TOTAL U/L  --   --   --   --  190 2   TROPONIN I ng/mL 0 25* 0 28* 0 51*   < > 3 64*   CK MB INDEX %  --   --   --   --  10 3*    < > = values in this interval not displayed       CBC with diff:  Results from last 7 days   Lab Units 02/04/21  0619 02/02/21  0507 02/01/21  0944   WBC Thousand/uL 9 61 5 09 6 11   HEMOGLOBIN g/dL 11 5 11 4* 12 9   HEMATOCRIT % 34 9 32 7* 36 4 MCV fL 84 79* 80*   PLATELETS Thousands/uL 247 210 254   MCH pg 27 5 27 7 28 4   MCHC g/dL 33 0 34 9 35 4   RDW % 13 8 13 4 13 6   MPV fL 10 6 10 4 9 7   NRBC AUTO /100 WBCs 0  --   --      CMP:  Results from last 7 days   Lab Units 02/04/21  0619 02/03/21  0235 02/02/21  1741 02/02/21  0509 02/02/21  0123 02/01/21  2200 02/01/21  1800 02/01/21  0944   SODIUM mmol/L 133* 132* 130* 121* 118* 116* 119* 120*   POTASSIUM mmol/L 4 2 3 7 3 4* 4 1 4 5 3 7 3 1* 3 5   CHLORIDE mmol/L 99* 100 96 91* 90* 89* 90* 85*   CO2 mmol/L 26 25 25 22 22 21* 22 25   ANION GAP mmol/L 8 7 9 8 6 6 7 10   BUN mg/dL 8 3* 30* 3* 3* 4* 4* 4*   CREATININE mg/dL 0 65 0 39* 0 90 0 38* 0 38* 0 43 0 44 0 58   CALCIUM mg/dL 8 4 7 6* 8 1* 7 3* 7 2* 6 9* 7 2* 9 0   AST U/L 32  --   --   --   --   --   --  28   ALT U/L 25  --   --   --   --   --   --  9   ALK PHOS U/L 42*  --   --   --   --   --   --  35 5   TOTAL PROTEIN g/dL 5 8*  --   --   --   --   --   --  7 2   ALBUMIN g/dL 2 9*  --   --   --   --   --   --  4 6   TOTAL BILIRUBIN mg/dL 0 20  --   --   --   --   --   --  0 78   EGFR ml/min/1 73sq m 93 111 67 112 112 107 106 97     Magnesium:  Results from last 7 days   Lab Units 02/04/21  0619 02/02/21  0509 02/01/21  0944   MAGNESIUM mg/dL 2 0 1 9 1 8     TSH:  Results from last 7 days   Lab Units 02/01/21  0944   TSH 3RD GENERATON uIU/mL 0 124*       Imaging & Testing   I have personally reviewed pertinent reports  Xr Chest 1 View Portable    Result Date: 2/1/2021  Narrative: CHEST INDICATION:   Weakness  Patient has suspected COVID-19  COMPARISON:  Chest radiograph from 4/3/2011  Thoracic spine CT from today which includes a large portion of the lungs  EXAM PERFORMED/VIEWS:  XR CHEST PORTABLE FINDINGS: Cardiomediastinal silhouette appears unremarkable  The lungs are clear  No pneumothorax or pleural effusion  Healed left rib fractures  Impression: No acute cardiopulmonary disease   Workstation performed: [id number]     Ct Head Without Contrast    Result Date: 2/1/2021  Narrative: CT BRAIN - WITHOUT CONTRAST INDICATION:   Trauma  COMPARISON:  4/3/2011  TECHNIQUE:  CT examination of the brain was performed  In addition to axial images, sagittal and coronal 2D reformatted images were created and submitted for interpretation  Radiation dose length product (DLP) for this visit:  843 3 mGy-cm   This examination, like all CT scans performed in the [facility], was performed utilizing techniques to minimize radiation dose exposure, including the use of iterative reconstruction and automated exposure control  IMAGE QUALITY:  Diagnostic  FINDINGS: PARENCHYMA:  No intracranial mass, mass effect or midline shift  No CT signs of acute infarction  No acute parenchymal hemorrhage  VENTRICLES AND EXTRA-AXIAL SPACES:  Normal for the patient's age  VISUALIZED ORBITS AND PARANASAL SINUSES:  Unremarkable  CALVARIUM AND EXTRACRANIAL SOFT TISSUES:  Normal      Impression: No acute intracranial abnormality  Workstation performed: [id number]     Ct Cervical Spine Without Contrast    Result Date: 2/1/2021  Narrative: CT CERVICAL SPINE - WITHOUT CONTRAST INDICATION:   Trauma  COMPARISON:  None  TECHNIQUE:  CT examination of the cervical spine was performed without intravenous contrast   Contiguous axial images were obtained  Sagittal and coronal reconstructions were performed  Radiation dose length product (DLP) for this visit:  128 5 mGy-cm   This examination, like all CT scans performed in the [facility], was performed utilizing techniques to minimize radiation dose exposure, including the use of iterative reconstruction and automated exposure control  IMAGE QUALITY:  Diagnostic  FINDINGS: ALIGNMENT:  Normal alignment of the cervical spine  No subluxation  VERTEBRAL BODIES:  No fracture  DEGENERATIVE CHANGES:  No significant cervical degenerative changes are noted  PREVERTEBRAL AND PARASPINAL SOFT TISSUES:  Unremarkable   THORACIC INLET:  Normal      Impression: No cervical spine fracture or traumatic malalignment  Workstation performed: [id number]     Ct Thoracic Spine Without Contrast    Result Date: 2/1/2021  Narrative: CT THORACIC AND LUMBAR SPINE INDICATION:   Trauma  COMPARISON:  None  TECHNIQUE:  Contiguous axial images were obtained  Sagittal and coronal reconstructions were performed  Radiation dose length product (DLP) for this visit:  252 8 mGy-cm  (accession [id number]), 0 mGy-cm  (accession [id number])  This examination, like all CT scans performed in the [facility], was performed utilizing techniques to minimize radiation dose exposure, including the use of iterative reconstruction and automated exposure control  IMAGE QUALITY:  Diagnostic  FINDINGS: ALIGNMENT:  Normal alignment of the thoracolumbar spine  No subluxation  VERTEBRAL BODIES: No fracture  DEGENERATIVE CHANGES:  No significant degenerative change is identified  PARASPINAL SOFT TISSUES: Normal      Impression: No fracture or malalignment identified  Workstation performed: [id number]     Ct Lumbar Spine Without Contrast    Result Date: 2/1/2021  Narrative: CT THORACIC AND LUMBAR SPINE INDICATION:   Trauma  COMPARISON:  None  TECHNIQUE:  Contiguous axial images were obtained  Sagittal and coronal reconstructions were performed  Radiation dose length product (DLP) for this visit:  252 8 mGy-cm  (accession [id number]), 0 mGy-cm  (accession [id number])  This examination, like all CT scans performed in the [facility], was performed utilizing techniques to minimize radiation dose exposure, including the use of iterative reconstruction and automated exposure control  IMAGE QUALITY:  Diagnostic  FINDINGS: ALIGNMENT:  Normal alignment of the thoracolumbar spine  No subluxation  VERTEBRAL BODIES: No fracture  DEGENERATIVE CHANGES:  No significant degenerative change is identified   PARASPINAL SOFT TISSUES: Normal      Impression: No fracture or malalignment identified  Workstation performed: [id number]        EKG / Monitor: Personally reviewed  Sinus tachycardia    Cardiac testing:   Results for orders placed during the hospital encounter of 21   Echo complete with contrast if indicated    Narrative [address]  [address]    Transthoracic Echocardiogram  2D, M-mode, Doppler, and Color Doppler    Study date:  2021    Patient: Diaz Montoya  MR number: [id number]  Account number: [de-identified]  : 1955  Age: 72 years  Gender: Female  Status: Inpatient  Location: [facility]  Height: 59 in  Weight: 70 lb  BP: 95/ 73 mmHg    Indications: Cardiomyopathy    Diagnoses: I42 9 - Cardiomyopathy, unspecified    Sonographer:  MARTHA Frias  Referring Physician:  Dawn Collins MD  Group:  [id number]  Interpreting Physician:  Tre Hernández MD    SUMMARY    LEFT VENTRICLE:  Systolic function was severely reduced by visual assessment  Ejection fraction was estimated in the range of 25 % to 30 %  There was of the mid-apical anterior, mid anteroseptal, mid inferoseptal, mid-apical inferior, apical septal, apical lateral, and apical wall(s)  This pattern is very suggestive of a stress induced cardiomyopathy  RIGHT VENTRICLE:  The size was normal   Systolic function was normal     MITRAL VALVE:  There was moderate regurgitation  AORTIC VALVE:  There was trace regurgitation  TRICUSPID VALVE:  There was mild regurgitation  AORTA:  There was dilatation of the ascending aorta at 29 mm in diameter  PERICARDIUM:  A trace pericardial effusion was identified  HISTORY: PRIOR HISTORY: DM2, HTN    PROCEDURE: The study was performed in the [facility]  This was a routine study  The transthoracic approach was used  The study included complete 2D imaging, M-mode, complete spectral Doppler, and color Doppler  The heart rate was 120 bpm, at  the start of the study   Images were obtained from the parasternal, apical, subcostal, and suprasternal notch acoustic windows  Image quality was adequate  LEFT VENTRICLE: Size was normal  Systolic function was severely reduced by visual assessment  Ejection fraction was estimated in the range of 25 % to 30 %  There was of the mid-apical anterior, mid anteroseptal, mid inferoseptal,  mid-apical inferior, apical septal, apical lateral, and apical wall(s)  This pattern is very suggestive of a stress induced cardiomyopathy  Wall thickness was normal  DOPPLER: Left ventricular diastolic function parameters were normal     RIGHT VENTRICLE: The size was normal  Systolic function was normal  Wall thickness was normal     LEFT ATRIUM: Size was normal     RIGHT ATRIUM: Size was normal     MITRAL VALVE: Valve structure was normal  There was normal leaflet separation  DOPPLER: The transmitral velocity was within the normal range  There was no evidence for stenosis  There was moderate regurgitation  AORTIC VALVE: The valve was trileaflet  Leaflets exhibited normal thickness and normal cuspal separation  DOPPLER: Transaortic velocity was within the normal range  There was no evidence for stenosis  There was trace regurgitation  TRICUSPID VALVE: The valve structure was normal  There was normal leaflet separation  DOPPLER: The transtricuspid velocity was within the normal range  There was no evidence for stenosis  There was mild regurgitation  PULMONIC VALVE: Leaflets exhibited normal thickness, no calcification, and normal cuspal separation  DOPPLER: The transpulmonic velocity was within the normal range  There was no significant regurgitation  PERICARDIUM: A trace pericardial effusion was identified  The pericardium was normal in appearance  AORTA: The root exhibited normal size  There was dilatation of the ascending aorta at 29 mm in diameter  SYSTEMIC VEINS: IVC: The inferior vena cava was normal in size   Respirophasic changes were normal     MEASUREMENT TABLES    OTHER ECHO MEASUREMENTS  (Reference normals)  Estimated CVP   15 mmHg   (--)    SYSTEM MEASUREMENT TABLES    2D  %FS: 10 39 %  Ao Diam: 2 96 cm  EDV(Teich): 52 49 ml  EF(Teich): 23 28 %  ESV(Teich): 40 27 ml  HR_2Ch_Q: 114 65 BPM  HR_4Ch_Q: 116 12 BPM  IVSd: 0 49 cm  LA Area: 11 59 cm2  LA Diam: 2 51 cm  LVCO_2Ch_Q: 1 98 L/min  LVCO_4Ch_Q: 1 62 L/min  LVCO_BiP_Q: 1 8 L/min  LVEF_2Ch_Q: 32 77 %  LVEF_4Ch_Q: 29 39 %  LVEF_BiP_Q: 31 31 %  LVIDd: 3 55 cm  LVIDs: 3 18 cm  LVLd_2Ch_Q: 6 71 cm  LVLd_4Ch_Q: 6 08 cm  LVLs_2Ch_Q: 6 23 cm  LVLs_4Ch_Q: 5 71 cm  LVPWd: 0 62 cm  LVSV_2Ch_Q: 17 25 ml  LVSV_4Ch_Q: 13 98 ml  LVSV_BiP_Q: 15 76 ml  LVVED_2Ch_Q: 52 64 ml  LVVED_4Ch_Q: 47 57 ml  LVVED_BiP_Q: 50 33 ml  LVVES_2Ch_Q: 35 39 ml  LVVES_4Ch_Q: 33 59 ml  LVVES_BiP_Q: 34 57 ml  RA Area: 8 2 cm2  RVIDd: 2 52 cm  RWT: 0 35  SV(Teich): 12 22 ml    CW  TR Vmax: 2 37 m/s  TR maxP 41 mmHg    MM  TAPSE: 1 49 cm    [facility] Accredited Echocardiography Laboratory    Prepared and electronically signed by    Kathleen Cronin MD  Signed 2021 14:33:59           NANI Wu        "This note has been constructed using a voice recognition system  Therefore there may be syntax, spelling, and/or grammatical errors   Please call if you have any questions  "

## 2021-02-04 NOTE — ASSESSMENT & PLAN NOTE
Generalized weakness is multifactorial, hyponatremia has since improved since admission to [facility], and steroid, thyroid medication adjustments  Currently generalized weakness is most likely 2/2 failure to thrive, severe protein calorie malnutrition     - recommendations as below see plan for hyponatremia and hypopituitarism  - PT OT  - case management for discharge planning

## 2021-02-04 NOTE — ASSESSMENT & PLAN NOTE
Home medications currently on hold due to borderline blood pressures in 90s to a low 100s during admission  Continue to monitor

## 2021-02-04 NOTE — ASSESSMENT & PLAN NOTE
Continue IV hydrocortisone, starting today she will have 50 mg in a m  And 10 mg at 4:00 p m    -appreciate further Endocrinology recommendations  - levothyroxine dose was increased to 50 mcg daily

## 2021-02-04 NOTE — ASSESSMENT & PLAN NOTE
Multifactorial 2/2 poor oral intake, adrenal insufficiency, SIADH  - sodium was 120 on admission and has since improved  - IVFs currently on hold  - Na 132 on yesterday's labs, continue to monitor with daily BMP  - appreciate further Nephrology recommendations

## 2021-02-05 NOTE — ASSESSMENT & PLAN NOTE
Patient's home medications have been on hold due to borderline low blood pressure  Patient was added on low-dose beta-blocker due to sinus tachycardia but could not be tolerated due to borderline low blood pressures

## 2021-02-05 NOTE — ASSESSMENT & PLAN NOTE
Continue IV hydrocortisone 10 milligram IV daily,  -appreciate further Endocrinology recommendations  - levothyroxine dose was increased to 50 mcg daily  Patient can resume home dose hydrocortisone upon discharge

## 2021-02-05 NOTE — ASSESSMENT & PLAN NOTE
Multifactorial 2/2 poor oral intake, adrenal insufficiency, SIADH  - sodium was 120 on admission and has since improved  - IVFs currently on hold  Sodium level has been stable around 132-133  Results from last 7 days   Lab Units 02/04/21  0619 02/03/21  0235 02/02/21  1741 02/02/21  0509 02/02/21  0123 02/01/21  2200 02/01/21  1800 02/01/21  0944   SODIUM mmol/L 133* 132* 130* 121* 118* 116* 119* 120*

## 2021-02-05 NOTE — ASSESSMENT & PLAN NOTE
Malnutrition Findings:   Adult Malnutrition type: Chronic illness(Severe protein calorie malnutrition due to inadequate energy intake as evidenced by hollow orbits, depressed temples and protruding clavicles  Treated with Regular diet and supplements) cachexia, no subcutaneous fat, BMI 14  Adult Degree of Malnutrition: Other severe protein calorie malnutrition  BMI Findings:  Adult BMI Classifications: Underweight < 18 5  Body mass index is 14 38 kg/m²       Nutrition consult appreciated   Continue diet with nutritional supplementation

## 2021-02-05 NOTE — ASSESSMENT & PLAN NOTE
Generalized weakness is multifactorial, hyponatremia has since improved since admission to [facility], and steroid, thyroid medication adjustments  Currently generalized weakness is most likely 2/2 failure to thrive, severe protein calorie malnutrition     - recommendations as below see plan for hyponatremia and hypopituitarism  - PT OT  - case management for discharge planning

## 2021-02-05 NOTE — OCCUPATIONAL THERAPY NOTE
Occupational Therapy Evaluation       02/05/21 1010   Note Type   Note type Evaluation   Restrictions/Precautions   Other Precautions Chair Alarm;Cognitive; Bed Alarm;Telemetry; Fall Risk   Pain Assessment   Pain Assessment Tool 0-10   Pain Score No Pain   Home Living   Type of Home Apartment   Home Layout One level;Stairs to enter with rails  (4 BANDAR)   Bathroom Shower/Tub Tub/shower unit   Bathroom Toilet Standard   Bathroom Equipment Commode   Home Equipment Other (Comment)  (None)   Additional Comments Patient originally admitted to [facility] for c/o weakness and difficulty ambulating, transferred to [facility] for nuclear stress test   Prior Function   Level of [place] Independent with ADLs and functional mobility   Lives With Alone   ADL Assistance Independent   IADLs Independent   Falls in the last 6 months 1 to 4   Comments Per patient, has been living alone in a small apartment  Patient reporting she has not left her apartment in approx  8-12 months  AT baseline patient is independent in ADLs and mobility however has been having increased difficulty lately, has not been completing ADLs and not been able to get herself up or walk   Unsure how patient gets groceries or transportation to doctors appointments as patient has not left her home in several months and reports she has no one coming in to help her   ADL   Eating Assistance 5  Supervision/Setup   Grooming Assistance 5  Supervision/Setup   UB Bathing Assistance 3  Moderate Assistance   LB Bathing Assistance 2  Maximal Assistance   [address] 3  Moderate Assistance   LB Dressing Assistance 2  Maximal [address] [place] [address]  2  Maximal Assistance   Bed Mobility   Supine to Sit 3  Moderate assistance   Additional items Assist x 1   Additional Comments Max assist to scoot forward towards EOB with use of draw sheet   Transfers   Sit to Stand 2  Maximal assistance   Additional items Assist x 1   Stand to Sit 2  Maximal assistance   Additional items Assist x 1   Additional Comments STS from EOB with max assist to RW for support, only able to tolerate few seconds static standing due to significant weakness, low activity tolerance   Balance   Static Sitting Fair -   Dynamic Sitting Poor +   Static Standing Poor   Dynamic Standing Poor   Activity Tolerance   Activity Tolerance Patient limited by fatigue; Other (Comment)  (Limited by generalized weakness)   RUE Assessment   RUE Assessment X  (ROM WFL)   RUE Strength   RUE Overall Strength Deficits  (Weakness, MMT grossly 3+/5 throughout)   LUE Assessment   LUE Assessment X  (ROM WFL)   LUE Strength   LUE Overall Strength   (Weakness, MMT grossly 3+/5 throughout)   Cognition   Arousal/Participation Alert; Cooperative  (COnfused at times)   Attention Attends with cues to redirect   Orientation Level Oriented X4   Following Commands Follows one step commands with increased time or repetition   Assessment   Limitation Decreased ADL status; Decreased UE strength;Decreased Safe judgement during ADL;Decreased endurance;Decreased self-care trans;Decreased high-level ADLs   Prognosis Good   Assessment Patient evaluated by Occupational Therapy  Patient admitted with elevated troponin  The patients occupational profile, medical and therapy history includes a extensive additional review of physical, cognitive, or psychosocial history related to current functional performance  Comorbidities affecting functional mobility and ADLS include: HTN, panhypopituitarism, thyroid disease, anxiety, OCD  Prior to admission, patient was independent with functional mobility without assistive device, independent with ADLS and independent with IADLS    The evaluation identifies the following performance deficits: weakness, impaired balance, decreased endurance, increased fall risk, new onset of impairment of functional mobility, decreased ADLS, decreased IADLS, decreased activity tolerance, decreased safety awareness and decreased strength, that result in activity limitations and/or participation restrictions  This evaluation requires clinical decision making of high complexity, because the patient presents with comorbidites that affect occupational performance and required significant modification of tasks or assistance with consideration of multiple treatment options  The Barthel Index was used as a functional outcome tool presenting with a score of 35, indicating marked limitations of functional mobility and ADLS  The patient's raw score on the -PAC Daily Activity inpatient short form is 15, standardized score is 34 69, less than 39 4  Patients at this level are likely to benefit from DC to post-acute rehabilitation services  Please refer to the recommendation of the Occupational Therapist for safe DC planning  Patient will benefit from skilled Occupational Therapy services to address above deficits and facilitate a safe return to prior level of function  Goals   Patient Goals " I want to stand up on my own'   STG Time Frame   (1-7 days)   Short Term Goal  Goals established to promote patient goal of 'standing on my own':  Patient will increase standing tolerance to 5 minutes during ADL task to decrease assistance level and decrease fall risk; Patient will increase bed mobility to min assist in preparation for ADLS and transfers;  Patient will increase functional mobility to and from bathroom with rolling walker with max assist to increase performance with ADLS and to use a toilet; Patient will tolerate 5 minutes of UE ROM/strengthening to increase general activity tolerance and performance in ADLS/IADLS; Patient will improve functional activity tolerance to 5 minutes of sustained functional tasks to increase participation in basic self-care and decrease assistance level;  Patient will be able to to verbalize understanding and perform energy conservation/proper body mechanics during ADLS and functional mobility at least 50% of the time with moderate cueing to decrease signs of fatigue and increase stamina to return to prior level of function; Patient will increase dynamic sitting balance to fair- to improve the ability to sit at edge of bed or on a chair for ADLS;  Patient will increase static/dynamic standing balance to poor+ to improve postural stability and decrease fall risk during standing ADLS and transfers  LTG Time Frame   (8-14 days)   Long Term Goal Patient will increase standing tolerance to 10 minutes during ADL task to decrease assistance level and decrease fall risk; Patient will increase bed mobility to supervision in preparation for ADLS and transfers; Patient will increase functional mobility to and from bathroom with rolling walker with mod assist to increase performance with ADLS and to use a toilet; Patient will tolerate 10 minutes of UE ROM/strengthening to increase general activity tolerance and performance in ADLS/IADLS; Patient will improve functional activity tolerance to 10 minutes of sustained functional tasks to increase participation in basic self-care and decrease assistance level;  Patient will be able to to verbalize understanding and perform energy conservation/proper body mechanics during ADLS and functional mobility at least 75% of the time with minimalcueing to decrease signs of fatigue and increase stamina to return to prior level of function; Patient will increase static/dynamic sitting balance to fair to improve the ability to sit at edge of bed or on a chair for ADLS;  Patient will increase static/dynamic standing balance to fair- to improve postural stability and decrease fall risk during standing ADLS and transfers     Functional Transfer Goals   Pt Will Perform All Functional Transfers   (STG mod assist, LTG min assist)   ADL Goals   Pt Will Perform Eating   (LTG independent)   Pt Will Perform Grooming   (LTG independent)   Pt Will Perform Bathing   (STG mod assist, LTG min assist)   Pt Will Perform UE Dressing   (STG min assist, LTG supervision)   Pt Will Perform LE Dressing   (STG mod assist, LTG min assist)   Pt Will Perform Toileting   (STG mod assist, LTG min assist)   Plan   Treatment Interventions ADL retraining;Functional transfer training;UE strengthening/ROM; Endurance training;Patient/family training;Equipment evaluation/education; Compensatory technique education;Continued evaluation; Energy conservation; Activityengagement   Goal Expiration Date 02/19/21   OT Frequency 3-5x/wk   Additional Treatment Session   Start Time 1000   End Time 1010   Treatment Assessment Patient performed sit to stand x 2 trials from EOB to RW for support, max assist each trial  Patient only able to tolerate few seconds static standing, +posterior lean due to significant weakness  Unable to take steps due to weakness, poor activity tolerance  Patient performed sit to stand again from EOB with max assist, stand pivot transfer bed to chair with max assist, no AD used  Patient performed scooting back into chair with max assist  At end of session patient seated in recliner with all needs met, chair alarm set      Recommendation   OT Discharge Recommendation Post-Acute Rehabilitation Services   AM-PAC Daily Activity Inpatient   Lower Body Dressing 2   Bathing 2   Toileting 2   Upper Body Dressing 2   Grooming 3   Eating 4   Daily Activity Raw Score 15   Daily Activity Standardized Score (Calc for Raw Score >=11) 34 69   AM-PAC Applied Cognition Inpatient   Following a Speech/Presentation 3   Understanding Ordinary Conversation 4   Taking Medications 3   Remembering Where Things Are Placed or Put Away 3   Remembering List of 4-5 Errands 3   Taking Care of Complicated Tasks 2   Applied Cognition Raw Score 18   Applied Cognition Standardized Score 38 07   Barthel Index   Feeding 5   Bathing 0   Grooming Score 0   Dressing Score 5   Bladder Score 5   Bowels Score 10   Toilet Use Score 5   Transfers (Bed/Chair) Score 5   Mobility (Level Surface) Score 0   Stairs Score 0   Barthel Index Score 28   Licensure   [place] License Number  [id number]

## 2021-02-05 NOTE — PROGRESS NOTES
Progress Note - Cardiology   [facility]     Cornelio Hamm 72 y o  female MRN: [id number]  : 1955  Unit/Bed#: [address] [place] [address] Encounter: [id number]    Assessment and Plan:   1  New onset cardiomyopathy:  EF 25-30% concerning for takotsubo cardiomyopathy  -  appears patient has had longstanding tachycardia, concerning for component of tachycardia induced cardiomyopathy    -   trialed on low-dose Lopressor at 12 5 mg twice a day and patient's blood pressure very low  -  will continue monitor     2  Abnormal troponins question non MI troponin elevation due to stress, anorexia and tachycardia verses MI type 2 with coronary blockages  -  Shannan Pace nuclear stress test identified area of fixed defect coordinating to the area of akinesis on her echocardiogram     -  patient unable to tolerate low-dose Lopressor as noted above    -  patient is not a good candidate for aggressive Rx      3  Tachycardia:  Sinus  Appears to be chronic and question whether reflexive due to her anorexia and decreased cardiac output  -  patient did not tolerate AV juana blocking medication (beta-blocker)    -  would be hesitant to start digoxin in the is cachectic patient due to high risk for side effects     4  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      5  Anorexia and PTSD:  BMI 14 3 concerning for failure to thrive    Subjective / Objective:   Patient seen and examined  Assisted to the bathroom with nursing staff and patient did fairly well  Still remains tachycardic and did not tolerate Lopressor 12 5 mg twice a day  Records from [facility] reviewed and appears patient has been tachycardic for quite some time as noted with heart rates greater than 100 documented in office visits  Echocardiogram concerning for ejection fraction 25-30% with apical ballooning and akinesis concerning for stress-induced cardiomyopathy      Nuclear stress test performed yesterday demonstrated a large mildly severe fixed myocardial perfusion defect of the entire anterior apical, inferior apical, apical lateral as well as apical septal walls in the same area coordinating with her echocardiogram     Vitals: Blood pressure 98/68, pulse (!) 107, temperature 98 6 °F (37 °C), resp  rate 15, height 4' 10" (1 473 m), weight 31 1 kg (68 lb 9 oz), SpO2 95 %  Vitals:    02/04/21 1952 02/05/21 0600   Weight: 31 1 kg (68 lb 9 oz) 31 1 kg (68 lb 9 oz)     Body mass index is 14 33 kg/m²  BP Readings from Last 3 Encounters:   02/05/21 98/68   02/03/21 97/78   09/28/20 91/60     Orthostatic Blood Pressures      Most Recent Value   Blood Pressure  98/68 filed at 02/05/2021 1226   Patient Position - Orthostatic VS  Lying filed at 02/04/2021 0128        I/O     None        Invasive Devices     Peripheral Intravenous Line            Peripheral IV 02/04/21 less than 1 day                No intake or output data in the 24 hours ending 02/05/21 1435      Physical Exam:   Physical Exam  Vitals signs and nursing note reviewed  Constitutional:       Appearance: She is well-developed  She is cachectic  HENT:      Head: Normocephalic  Right Ear: External ear normal       Left Ear: External ear normal       Nose: Nose normal    Eyes:      General: No scleral icterus  Right eye: No discharge  Left eye: No discharge  Pupils: Pupils are equal, round, and reactive to light  Neck:      Musculoskeletal: Normal range of motion and neck supple  Thyroid: No thyromegaly  Cardiovascular:      Rate and Rhythm: Regular rhythm  Tachycardia present  Pulses: Normal pulses  Heart sounds: No murmur  Pulmonary:      Effort: Pulmonary effort is normal  No respiratory distress  Breath sounds: Rhonchi present  No wheezing or rales  Abdominal:      General: Bowel sounds are normal  There is no distension  Palpations: Abdomen is soft  Musculoskeletal:      Right lower leg: No edema  Left lower leg: No edema  Skin:     General: Skin is warm and dry  Capillary Refill: Capillary refill takes less than 2 seconds  Neurological:      General: No focal deficit present  Mental Status: She is alert and oriented to person, place, and time  Mental status is at baseline  Psychiatric:         Attention and Perception: Attention and perception normal          Mood and Affect: Mood is anxious  Speech: She is noncommunicative  Behavior: Behavior normal  Behavior is cooperative  Thought Content:  Thought content normal                  Medications/ Allergies:     Current Facility-Administered Medications   Medication Dose Route Frequency Provider Last Rate    acetaminophen  650 mg Oral Q6H PRN [facility], PA-GLENN      ARIPiprazole  15 mg Oral BID [facility], PA-GLENN      aspirin  81 mg Oral Daily [facility], [place]      clonazePAM  0 5 mg Oral BID [facility], PA-[place]ENN      enoxaparin  30 mg Subcutaneous Q24H [address] [facility] [place]      ferrous sulfate  325 mg Oral Daily With Breakfast Chip Reynoso PA-C      [START ON 2/6/2021] hydrocortisone sodium succinate  10 mg Intravenous Daily [facility], PA-[place]ENN      hydrocortisone sodium succinate  15 mg Intravenous Early Morning [facility], PA-[place]ENN      levothyroxine  50 mcg Oral Early Morning [facility]      multivitamin-minerals  1 tablet Oral Daily [facility]      ondansetron  4 mg Intravenous Q6H PRN [facility] [place]      polyethylene glycol  17 g Oral Daily [facility], [place]      potassium-sodium phosphateS  1 tablet Oral TID With Meals [facility] [place]      thiamine  100 mg Oral Daily [facility], [place]      traZODone  50 mg Oral HS [facility], [place]       acetaminophen, 650 mg, Q6H PRN  ondansetron, 4 mg, Q6H PRN      Allergies   Allergen Reactions    Codeine     Penicillins     Sulfa Antibiotics        VTE Pharmacologic Prophylaxis:   Sequential compression device (Venodyne)     Labs:   Troponins:  Results from last 7 days   Lab Units 02/04/21  0914 02/04/21  0619 02/03/21  0138  02/01/21  0944   CK TOTAL U/L  --   --   --   --  190 2   TROPONIN I ng/mL 0 25* 0 28* 0 51*   < > 3 64*   CK MB INDEX %  --   --   --   --  10 3*    < > = values in this interval not displayed         CBC with diff:  Results from last 7 days   Lab Units 02/04/21  0619 02/02/21  0507 02/01/21  0944   WBC Thousand/uL 9 61 5 09 6 11   HEMOGLOBIN g/dL 11 5 11 4* 12 9   HEMATOCRIT % 34 9 32 7* 36 4   MCV fL 84 79* 80*   PLATELETS Thousands/uL 247 210 254   MCH pg 27 5 27 7 28 4   MCHC g/dL 33 0 34 9 35 4   RDW % 13 8 13 4 13 6   MPV fL 10 6 10 4 9 7   NRBC AUTO /100 WBCs 0  --   --        CMP:  Results from last 7 days   Lab Units 02/04/21  0619 02/03/21  0235 02/02/21  1741 02/02/21  0509 02/02/21  0123 02/01/21  2200 02/01/21  1800 02/01/21  0944   SODIUM mmol/L 133* 132* 130* 121* 118* 116* 119* 120*   POTASSIUM mmol/L 4 2 3 7 3 4* 4 1 4 5 3 7 3 1* 3 5   CHLORIDE mmol/L 99* 100 96 91* 90* 89* 90* 85*   CO2 mmol/L 26 25 25 22 22 21* 22 25   ANION GAP mmol/L 8 7 9 8 6 6 7 10   BUN mg/dL 8 3* 30* 3* 3* 4* 4* 4*   CREATININE mg/dL 0 65 0 39* 0 90 0 38* 0 38* 0 43 0 44 0 58   CALCIUM mg/dL 8 4 7 6* 8 1* 7 3* 7 2* 6 9* 7 2* 9 0   AST U/L 32  --   --   --   --   --   --  28   ALT U/L 25  --   --   --   --   --   --  9   ALK PHOS U/L 42*  --   --   --   --   --   --  35 5   TOTAL PROTEIN g/dL 5 8*  --   --   --   --   --   --  7 2   ALBUMIN g/dL 2 9*  --   --   --   --   --   --  4 6   TOTAL BILIRUBIN mg/dL 0 20  --   --   --   --   --   --  0 78   EGFR ml/min/1 73sq m 93 111 67 112 112 107 106 97       Magnesium:  Results from last 7 days   Lab Units 02/04/21  0619 02/02/21  0509 02/01/21  0944   MAGNESIUM mg/dL 2 0 1 9 1 8     TSH:  Results from last 7 days   Lab Units 02/01/21  0944   TSH 3RD GENERATON uIU/mL 0 124*       Imaging & Testing   I have personally reviewed pertinent reports  Xr Chest 1 View Portable    Result Date: 2/1/2021  Narrative: CHEST INDICATION:   Weakness  Patient has suspected COVID-19  COMPARISON:  Chest radiograph from 4/3/2011  Thoracic spine CT from today which includes a large portion of the lungs  EXAM PERFORMED/VIEWS:  XR CHEST PORTABLE FINDINGS: Cardiomediastinal silhouette appears unremarkable  The lungs are clear  No pneumothorax or pleural effusion  Healed left rib fractures  Impression: No acute cardiopulmonary disease  Workstation performed: [id number]     Ct Head Without Contrast    Result Date: 2/1/2021  Narrative: CT BRAIN - WITHOUT CONTRAST INDICATION:   Trauma  COMPARISON:  4/3/2011  TECHNIQUE:  CT examination of the brain was performed  In addition to axial images, sagittal and coronal 2D reformatted images were created and submitted for interpretation  Radiation dose length product (DLP) for this visit:  843 3 mGy-cm   This examination, like all CT scans performed in the [facility], was performed utilizing techniques to minimize radiation dose exposure, including the use of iterative reconstruction and automated exposure control  IMAGE QUALITY:  Diagnostic  FINDINGS: PARENCHYMA:  No intracranial mass, mass effect or midline shift  No CT signs of acute infarction  No acute parenchymal hemorrhage  VENTRICLES AND EXTRA-AXIAL SPACES:  Normal for the patient's age  VISUALIZED ORBITS AND PARANASAL SINUSES:  Unremarkable  CALVARIUM AND EXTRACRANIAL SOFT TISSUES:  Normal      Impression: No acute intracranial abnormality  Workstation performed: [id number]     Ct Cervical Spine Without Contrast    Result Date: 2/1/2021  Narrative: CT CERVICAL SPINE - WITHOUT CONTRAST INDICATION:   Trauma  COMPARISON:  None  TECHNIQUE:  CT examination of the cervical spine was performed without intravenous contrast   Contiguous axial images were obtained  Sagittal and coronal reconstructions were performed  Radiation dose length product (DLP) for this visit:  128 5 mGy-cm   This examination, like all CT scans performed in the [facility], was performed utilizing techniques to minimize radiation dose exposure, including the use of iterative reconstruction and automated exposure control  IMAGE QUALITY:  Diagnostic  FINDINGS: ALIGNMENT:  Normal alignment of the cervical spine  No subluxation  VERTEBRAL BODIES:  No fracture  DEGENERATIVE CHANGES:  No significant cervical degenerative changes are noted  PREVERTEBRAL AND PARASPINAL SOFT TISSUES:  Unremarkable  THORACIC INLET:  Normal      Impression: No cervical spine fracture or traumatic malalignment  Workstation performed: [id number]     Ct Thoracic Spine Without Contrast    Result Date: 2/1/2021  Narrative: CT THORACIC AND LUMBAR SPINE INDICATION:   Trauma  COMPARISON:  None  TECHNIQUE:  Contiguous axial images were obtained  Sagittal and coronal reconstructions were performed  Radiation dose length product (DLP) for this visit:  252 8 mGy-cm  (accession [id number]), 0 mGy-cm  (accession [id number])  This examination, like all CT scans performed in the [facility], was performed utilizing techniques to minimize radiation dose exposure, including the use of iterative reconstruction and automated exposure control  IMAGE QUALITY:  Diagnostic  FINDINGS: ALIGNMENT:  Normal alignment of the thoracolumbar spine  No subluxation  VERTEBRAL BODIES: No fracture  DEGENERATIVE CHANGES:  No significant degenerative change is identified  PARASPINAL SOFT TISSUES: Normal      Impression: No fracture or malalignment identified  Workstation performed: [id number]     Ct Lumbar Spine Without Contrast    Result Date: 2/1/2021  Narrative: CT THORACIC AND LUMBAR SPINE INDICATION:   Trauma  COMPARISON:  None  TECHNIQUE:  Contiguous axial images were obtained  Sagittal and coronal reconstructions were performed    Radiation dose length product (DLP) for this visit: 252 8 mGy-cm  (accession [id number]), 0 mGy-cm  (accession [id number])  This examination, like all CT scans performed in the [facility], was performed utilizing techniques to minimize radiation dose exposure, including the use of iterative reconstruction and automated exposure control  IMAGE QUALITY:  Diagnostic  FINDINGS: ALIGNMENT:  Normal alignment of the thoracolumbar spine  No subluxation  VERTEBRAL BODIES: No fracture  DEGENERATIVE CHANGES:  No significant degenerative change is identified  PARASPINAL SOFT TISSUES: Normal      Impression: No fracture or malalignment identified  Workstation performed: [id number]        EKG / Monitor: Personally reviewed  Telemetry shows sinus tachycardia    Cardiac testing:   Results for orders placed during the hospital encounter of 21   Echo complete with contrast if indicated    Narrative [address]  [address]    Transthoracic Echocardiogram  2D, M-mode, Doppler, and Color Doppler    Study date:  2021    Patient: Madeline Lion  MR number: [id number]  Account number: [de-identified]  : 1955  Age: 72 years  Gender: Female  Status: Inpatient  Location: [place]  Height: 59 in  Weight: 70 lb  BP: 95/ 73 mmHg    Indications: Cardiomyopathy    Diagnoses: I42 9 - Cardiomyopathy, unspecified    Sonographer:  MARTHA Tafoya  Referring Physician:  Guevara Maurice MD  Group:  [facility]  Interpreting Physician:  Juan Alberto Barnes MD    SUMMARY    LEFT VENTRICLE:  Systolic function was severely reduced by visual assessment  Ejection fraction was estimated in the range of 25 % to 30 %  There was of the mid-apical anterior, mid anteroseptal, mid inferoseptal, mid-apical inferior, apical septal, apical lateral, and apical wall(s)  This pattern is very suggestive of a stress induced cardiomyopathy      RIGHT VENTRICLE:  The size was normal   Systolic function was normal     MITRAL VALVE:  There was moderate regurgitation  AORTIC VALVE:  There was trace regurgitation  TRICUSPID VALVE:  There was mild regurgitation  AORTA:  There was dilatation of the ascending aorta at 29 mm in diameter  PERICARDIUM:  A trace pericardial effusion was identified  HISTORY: PRIOR HISTORY: DM2, HTN    PROCEDURE: The study was performed in the [place]  This was a routine study  The transthoracic approach was used  The study included complete 2D imaging, M-mode, complete spectral Doppler, and color Doppler  The heart rate was 120 bpm, at  the start of the study  Images were obtained from the parasternal, apical, subcostal, and suprasternal notch acoustic windows  Image quality was adequate  LEFT VENTRICLE: Size was normal  Systolic function was severely reduced by visual assessment  Ejection fraction was estimated in the range of 25 % to 30 %  There was of the mid-apical anterior, mid anteroseptal, mid inferoseptal,  mid-apical inferior, apical septal, apical lateral, and apical wall(s)  This pattern is very suggestive of a stress induced cardiomyopathy  Wall thickness was normal  DOPPLER: Left ventricular diastolic function parameters were normal     RIGHT VENTRICLE: The size was normal  Systolic function was normal  Wall thickness was normal     LEFT ATRIUM: Size was normal     RIGHT ATRIUM: Size was normal     MITRAL VALVE: Valve structure was normal  There was normal leaflet separation  DOPPLER: The transmitral velocity was within the normal range  There was no evidence for stenosis  There was moderate regurgitation  AORTIC VALVE: The valve was trileaflet  Leaflets exhibited normal thickness and normal cuspal separation  DOPPLER: Transaortic velocity was within the normal range  There was no evidence for stenosis  There was trace regurgitation  TRICUSPID VALVE: The valve structure was normal  There was normal leaflet separation   DOPPLER: The transtricuspid velocity was within the normal range  There was no evidence for stenosis  There was mild regurgitation  PULMONIC VALVE: Leaflets exhibited normal thickness, no calcification, and normal cuspal separation  DOPPLER: The transpulmonic velocity was within the normal range  There was no significant regurgitation  PERICARDIUM: A trace pericardial effusion was identified  The pericardium was normal in appearance  AORTA: The root exhibited normal size  There was dilatation of the ascending aorta at 29 mm in diameter  SYSTEMIC VEINS: IVC: The inferior vena cava was normal in size  Respirophasic changes were normal     MEASUREMENT TABLES    OTHER ECHO MEASUREMENTS  (Reference normals)  Estimated CVP   15 mmHg   (--)    SYSTEM MEASUREMENT TABLES    2D  %FS: 10 39 %  Ao Diam: 2 96 cm  EDV(Teich): 52 49 ml  EF(Teich): 23 28 %  ESV(Teich): 40 27 ml  HR_2Ch_Q: 114 65 BPM  HR_4Ch_Q: 116 12 BPM  IVSd: 0 49 cm  LA Area: 11 59 cm2  LA Diam: 2 51 cm  LVCO_2Ch_Q: 1 98 L/min  LVCO_4Ch_Q: 1 62 L/min  LVCO_BiP_Q: 1 8 L/min  LVEF_2Ch_Q: 32 77 %  LVEF_4Ch_Q: 29 39 %  LVEF_BiP_Q: 31 31 %  LVIDd: 3 55 cm  LVIDs: 3 18 cm  LVLd_2Ch_Q: 6 71 cm  LVLd_4Ch_Q: 6 08 cm  LVLs_2Ch_Q: 6 23 cm  LVLs_4Ch_Q: 5 71 cm  LVPWd: 0 62 cm  LVSV_2Ch_Q: 17 25 ml  LVSV_4Ch_Q: 13 98 ml  LVSV_BiP_Q: 15 76 ml  LVVED_2Ch_Q: 52 64 ml  LVVED_4Ch_Q: 47 57 ml  LVVED_BiP_Q: 50 33 ml  LVVES_2Ch_Q: 35 39 ml  LVVES_4Ch_Q: 33 59 ml  LVVES_BiP_Q: 34 57 ml  RA Area: 8 2 cm2  RVIDd: 2 52 cm  RWT: 0 35  SV(Teich): 12 22 ml    CW  TR Vmax: 2 37 m/s  TR maxP 41 mmHg    MM  TAPSE: 1 49 cm    [facility]    Prepared and electronically signed by    Corinna Paredes MD  Signed 2021 14:33:59         NANI Davey        "This note has been constructed using a voice recognition system  Therefore there may be syntax, spelling, and/or grammatical errors   Please call if you have any questions  "

## 2021-02-05 NOTE — ASSESSMENT & PLAN NOTE
Heart rate is better controlled in the range of 110-120  Continue low-dose beta-blocker as tolerated

## 2021-02-05 NOTE — ASSESSMENT & PLAN NOTE
Patient presented to the ED at [facility] with an elevated troponin peaked at 3 6, currently trending down  Most recent was 0 05  Likely non MI troponin elevation in the setting of stress cardiomyopathy- patient underwent echo which showed an EF of 30%  Cardiology consult appreciated  Patient underwent nuclear stress test  which was markedly abnormal with mild diffuse decrease in uptake in apical and mid walls involving all the walls    LVEF was severely decreased with apical ballooning  Continue medical management as per Cardiology

## 2021-02-06 NOTE — ASSESSMENT & PLAN NOTE
Patient did not have a bowel movement in 1 week  Continue MiraLax 17 grams p o   Daily  Patient had a large brown bowel movement with enema

## 2021-02-06 NOTE — ASSESSMENT & PLAN NOTE
Heart rate continues to remain uncontrolled  Patient not able to tolerate beta-blocker due to low blood pressure  Patient is being loaded with digoxin as per Cardiology

## 2021-02-06 NOTE — ASSESSMENT & PLAN NOTE
Malnutrition Findings:   Adult Malnutrition type: Chronic illness(Severe protein calorie malnutrition due to inadequate energy intake as evidenced by hollow orbits, depressed temples and protruding clavicles  Treated with Regular diet and supplements) cachexia, no subcutaneous fat, BMI 14  Adult Degree of Malnutrition: Other severe protein calorie malnutrition  BMI Findings:  Adult BMI Classifications: Underweight < 18 5  Body mass index is 14 33 kg/m²       Nutrition consult appreciated   Continue diet with nutritional supplementation

## 2021-02-06 NOTE — ASSESSMENT & PLAN NOTE
Patient presented to the ED at [facility] with an elevated troponin peaked at 3 6, currently trending down  Most recent was 0 05  Likely non MI troponin elevation in the setting of stress cardiomyopathy- patient underwent echo which showed an EF of 30%  Cardiology consult appreciated  Patient underwent nuclear stress test  which was markedly abnormal with mild diffuse decrease in uptake in apical and mid walls involving all the walls    LVEF was severely decreased with apical ballooning  Continue medical management as per Cardiology

## 2021-02-06 NOTE — PROGRESS NOTES
Daily Cardiology Progress Note              LOS: 2 days     Assessment/Plan     Principal Problem:    Elevated troponin  Active Problems:    Hyponatremia    FTT (failure to thrive) in adult    Constipation    Severe protein-calorie malnutrition (HCC)    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HCC)    Thyroid disorder    Hypertension    Generalized weakness    Sinus tachycardia    1  Elevated troponin likely non-MI troponin elevation due to stress cardiomyopathy  Echocardiogram reduced to 30%  Nuclear stress test showed large fixed defect of the apex  Ejection fraction was 20% on nuclear imaging   - she has no symptoms of congestive heart failure  - could not use metoprolol as she has chronic hypotension  - will add digoxin  2  Sinus tachycardia - could not tolerate low dose metoprolol due to hypotension   - will begin digoxin - may use low dose afterwards  3  Anorexia with BMI of 14      Subjective     Interval History: No chest pain or shortness of breath  Feels intermittent palpitations  Denies any LE edema  Objective     Vital signs in last 24 hours:  Temp:  [97 6 °F (36 4 °C)-98 5 °F (36 9 °C)] 98 °F (36 7 °C)  HR:  [] 63  Resp:  [14-18] 18  BP: ()/(65-77) 106/70    Intake/Output last 3 shifts:  I/O last 3 completed shifts:  In: -   Out: 500 [Urine:500]  Intake/Output this shift:  I/O this shift:  In: 360 [P O :360]  Out: -     Physical Exam:  Physical Exam   Constitutional: She appears cachectic  No distress  She appears chronically ill  Eyes: Pupils are equal, round, and reactive to light  Conjunctivae are normal    Neck: Normal range of motion  Neck supple  No JVD present  Cardiovascular: Regular rhythm and normal heart sounds  Tachycardia present  Exam reveals no gallop and no friction rub  No murmur heard  Pulmonary/Chest: Effort normal and breath sounds normal  She has no wheezes  She has no rales  Musculoskeletal:         General: No tenderness, deformity or edema  Neurological: She is alert and oriented to person, place, and time  Skin: Skin is warm and dry  Lab Results: I have personally reviewed pertinent lab results  Imaging: I have personally reviewed pertinent films in PACS  EKG/Tele: Reviewed

## 2021-02-06 NOTE — PLAN OF CARE
Problem: Potential for Falls  Goal: Patient will remain free of falls  Description: INTERVENTIONS:  - Assess patient frequently for physical needs  -  Identify cognitive and physical deficits and behaviors that affect risk of falls    -  [place] fall precautions as indicated by assessment   - Educate patient/family on patient safety including physical limitations  - Instruct patient to call for assistance with activity based on assessment  - Modify environment to reduce risk of injury  - Consider OT/PT consult to assist with strengthening/mobility  Outcome: Progressing     Problem: Prexisting or High Potential for Compromised Skin Integrity  Goal: Skin integrity is maintained or improved  Description: INTERVENTIONS:  - Identify patients at risk for skin breakdown  - Assess and monitor skin integrity  - Assess and monitor nutrition and hydration status  - Monitor labs   - Assess for incontinence   - Turn and reposition patient  - Assist with mobility/ambulation  - Relieve pressure over bony prominences  - Avoid friction and shearing  - Provide appropriate hygiene as needed including keeping skin clean and dry  - Evaluate need for skin moisturizer/barrier cream  - Collaborate with interdisciplinary team   - Patient/family teaching  - Consider wound care consult   Outcome: Progressing     Problem: PAIN - ADULT  Goal: Verbalizes/displays adequate comfort level or baseline comfort level  Description: Interventions:  - Encourage patient to monitor pain and request assistance  - Assess pain using appropriate pain scale  - Administer analgesics based on type and severity of pain and evaluate response  - Implement non-pharmacological measures as appropriate and evaluate response  - Consider cultural and social influences on pain and pain management  - Notify physician/advanced practitioner if interventions unsuccessful or patient reports new pain  Outcome: Progressing     Problem: INFECTION - ADULT  Goal: Absence or prevention of progression during hospitalization  Description: INTERVENTIONS:  - Assess and monitor for signs and symptoms of infection  - Monitor lab/diagnostic results  - Monitor all insertion sites, i e  indwelling lines, tubes, and drains  - Monitor endotracheal if appropriate and nasal secretions for changes in amount and color  - [place] appropriate cooling/warming therapies per order  - Administer medications as ordered  - Instruct and encourage patient and family to use good hand hygiene technique  - Identify and instruct in appropriate isolation precautions for identified infection/condition  Outcome: Progressing     Problem: SAFETY ADULT  Goal: Patient will remain free of falls  Description: INTERVENTIONS:  - Assess patient frequently for physical needs  -  Identify cognitive and physical deficits and behaviors that affect risk of falls    -  [place] fall precautions as indicated by assessment   - Educate patient/family on patient safety including physical limitations  - Instruct patient to call for assistance with activity based on assessment  - Modify environment to reduce risk of injury  - Consider OT/PT consult to assist with strengthening/mobility  Outcome: Progressing  Goal: Maintain or return to baseline ADL function  Description: INTERVENTIONS:  -  Assess patient's ability to carry out ADLs; assess patient's baseline for ADL function and identify physical deficits which impact ability to perform ADLs (bathing, care of mouth/teeth, toileting, grooming, dressing, etc )  - Assess/evaluate cause of self-care deficits   - Assess range of motion  - Assess patient's mobility; develop plan if impaired  - Assess patient's need for assistive devices and provide as appropriate  - Encourage maximum independence but intervene and supervise when necessary  - Involve family in performance of ADLs  - Assess for home care needs following discharge   - Consider OT consult to assist with ADL evaluation and planning for discharge  - Provide patient education as appropriate  Outcome: Progressing  Goal: Maintain or return mobility status to optimal level  Description: INTERVENTIONS:  - Assess patient's baseline mobility status (ambulation, transfers, stairs, etc )    - Identify cognitive and physical deficits and behaviors that affect mobility  - Identify mobility aids required to assist with transfers and/or ambulation (gait belt, sit-to-stand, lift, walker, cane, etc )  - [place] fall precautions as indicated by assessment  - Record patient progress and toleration of activity level on Mobility SBAR; progress patient to next Phase/Stage  - Instruct patient to call for assistance with activity based on assessment  - Consider rehabilitation consult to assist with strengthening/weightbearing, etc   Outcome: Progressing     Problem: DISCHARGE PLANNING  Goal: Discharge to home or other facility with appropriate resources  Description: INTERVENTIONS:  - Identify barriers to discharge w/patient and caregiver  - Arrange for needed discharge resources and transportation as appropriate  - Identify discharge learning needs (meds, wound care, etc )  - Arrange for interpretive services to assist at discharge as needed  - Refer to Case Management Department for coordinating discharge planning if the patient needs post-hospital services based on physician/advanced practitioner order or complex needs related to functional status, cognitive ability, or social support system  Outcome: Progressing     Problem: Knowledge Deficit  Goal: Patient/family/caregiver demonstrates understanding of disease process, treatment plan, medications, and discharge instructions  Description: Complete learning assessment and assess knowledge base    Interventions:  - Provide teaching at level of understanding  - Provide teaching via preferred learning methods  Outcome: Progressing     Problem: CARDIOVASCULAR - ADULT  Goal: Maintains optimal cardiac output and hemodynamic stability  Description: INTERVENTIONS:  - Monitor I/O, vital signs and rhythm  - Monitor for S/S and trends of decreased cardiac output  - Administer and titrate ordered vasoactive medications to optimize hemodynamic stability  - Assess quality of pulses, skin color and temperature  - Assess for signs of decreased coronary artery perfusion  - Instruct patient to report change in severity of symptoms  Outcome: Progressing  Goal: Absence of cardiac dysrhythmias or at baseline rhythm  Description: INTERVENTIONS:  - Continuous cardiac monitoring, vital signs, obtain 12 lead EKG if ordered  - Administer antiarrhythmic and heart rate control medications as ordered  - Monitor electrolytes and administer replacement therapy as ordered  Outcome: Progressing     Problem: Nutrition/Hydration-ADULT  Goal: Nutrient/Hydration intake appropriate for improving, restoring or maintaining nutritional needs  Description: Monitor and assess patient's nutrition/hydration status for malnutrition  Collaborate with interdisciplinary team and initiate plan and interventions as ordered  Monitor patient's weight and dietary intake as ordered or per policy  Utilize nutrition screening tool and intervene as necessary  Determine patient's food preferences and provide high-protein, high-caloric foods as appropriate       INTERVENTIONS:  - Monitor oral intake, urinary output, labs, and treatment plans  - Assess nutrition and hydration status and recommend course of action  - Evaluate amount of meals eaten  - Assist patient with eating if necessary   - Allow adequate time for meals  - Recommend/ encourage appropriate diets, oral nutritional supplements, and vitamin/mineral supplements  - Order, calculate, and assess calorie counts as needed  - Recommend, monitor, and adjust tube feedings and TPN/PPN based on assessed needs  - Assess need for intravenous fluids  - Provide specific nutrition/hydration education as appropriate  - Include patient/family/caregiver in decisions related to nutrition  Outcome: Progressing

## 2021-02-06 NOTE — ASSESSMENT & PLAN NOTE
Multifactorial 2/2 poor oral intake, adrenal insufficiency, SIADH  - sodium was 120 on admission and has since improved  - IVFs currently on hold  Sodium level was slightly low but improved to normal level today  Results from last 7 days   Lab Units 02/06/21  0501 02/04/21  0619 02/03/21  0235 02/02/21  1741 02/02/21  0509 02/02/21  0123 02/01/21  2200 02/01/21  1800 02/01/21  0944   SODIUM mmol/L 139 133* 132* 130* 121* 118* 116* 119* 120*

## 2021-02-06 NOTE — ASSESSMENT & PLAN NOTE
Generalized weakness is multifactorial, hyponatremia has since improved since admission to [facility], and steroid, thyroid medication adjustments  Currently generalized weakness is most likely 2/2 failure to thrive, severe protein calorie malnutrition     - recommendations as below see plan for hyponatremia and hypopituitarism  - PT OT  - case management for discharge planning

## 2021-02-06 NOTE — PROGRESS NOTES
Progress Note Raffi Rivers 1955, 72 y o  female MRN: [id number]    Unit/Bed#: 76567 [facility] 405-01 Encounter: [id number]    Primary Care Provider: Krystina Person,    Date and time admitted to hospital: 2/4/2021  1:25 AM        * Elevated troponin  Assessment & Plan  Patient presented to the ED at [place] with an elevated troponin peaked at 3 6, currently trending down  Most recent was 0 05  Likely non MI troponin elevation in the setting of stress cardiomyopathy- patient underwent echo which showed an EF of 30%  Cardiology consult appreciated  Patient underwent nuclear stress test  which was markedly abnormal with mild diffuse decrease in uptake in apical and mid walls involving all the walls  LVEF was severely decreased with apical ballooning  Continue medical management as per Cardiology        Hyponatremia  Assessment & Plan  Multifactorial 2/2 poor oral intake, adrenal insufficiency, SIADH  - sodium was 120 on admission and has since improved  - IVFs currently on hold  Sodium level has been stable around 132-133  Results from last 7 days   Lab Units 02/04/21  0619 02/03/21  0235 02/02/21  1741 02/02/21  0509 02/02/21  0123 02/01/21  2200 02/01/21  1800 02/01/21  0944   SODIUM mmol/L 133* 132* 130* 121* 118* 116* 119* 120*       Sinus tachycardia  Assessment & Plan  Heart rate is better controlled in the range of 110-120  Continue low-dose beta-blocker as tolerated    Generalized weakness  Assessment & Plan  Generalized weakness is multifactorial, hyponatremia has since improved since admission to [facility], and steroid, thyroid medication adjustments  Currently generalized weakness is most likely 2/2 failure to thrive, severe protein calorie malnutrition     - recommendations as below see plan for hyponatremia and hypopituitarism  - PT OT  - case management for discharge planning    Hypertension  Assessment & Plan  Patient's home medications have been on hold due to borderline low blood pressure  Patient was added on low-dose beta-blocker due to sinus tachycardia but could not be tolerated due to borderline low blood pressures    Thyroid disorder  Assessment & Plan  Levothyroxine dose increased to 50 mcg daily    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HCC)  Assessment & Plan  Continue IV hydrocortisone 10 milligram IV daily,  -appreciate further Endocrinology recommendations  - levothyroxine dose was increased to 50 mcg daily  Patient can resume home dose hydrocortisone upon discharge    Severe protein-calorie malnutrition ([address] )  Assessment & Plan  Malnutrition Findings:   Adult Malnutrition type: Chronic illness(Severe protein calorie malnutrition due to inadequate energy intake as evidenced by hollow orbits, depressed temples and protruding clavicles  Treated with Regular diet and supplements) cachexia, no subcutaneous fat, BMI 14  Adult Degree of Malnutrition: Other severe protein calorie malnutrition  BMI Findings:  Adult BMI Classifications: Underweight < 18 5  Body mass index is 14 38 kg/m²  Nutrition consult appreciated   Continue diet with nutritional supplementation    Constipation  Assessment & Plan  Patient did not have a bowel movement in 1 week  Continue MiraLax 17 grams p o  Daily  Patient ordered for soapsuds enema    FTT (failure to thrive) in adult  Assessment & Plan  Patient has a history of anorexia nervosa  Continue thiamine, phosphate supplements  PT/OT  Appreciate case management consult for discharge planning      VTE Pharmacologic Prophylaxis:   Pharmacologic: Enoxaparin (Lovenox)  Mechanical VTE Prophylaxis in Place: Yes    Patient Centered Rounds: I have performed bedside rounds with nursing staff today  Discussions with Specialists or Other Care Team Provider: Yes    Education and Discussions with Family / Patient: *yes    Time Spent for Care: 30 minutes  More than 50% of total time spent on counseling and coordination of care as described above      Current Length of Stay: 1 day(s)    Current Patient Status: Inpatient   Certification Statement: The patient will continue to require additional inpatient hospital stay due to Sinus tachycardia, hypotension, constipated    Discharge Plan:  Short-term rehabilitation    Code Status: Level 3 - DNAR and DNI      Subjective:   Patient did not have a bowel movement in 1 week  Otherwise denies any chest pain, shortness of breath, palpitations    Objective:     Vitals:   Temp (24hrs), Av 9 °F (36 6 °C), Min:97 2 °F (36 2 °C), Max:98 6 °F (37 °C)    Temp:  [97 2 °F (36 2 °C)-98 6 °F (37 °C)] 98 5 °F (36 9 °C)  HR:  [107-113] 111  Resp:  [15-18] 18  BP: (88-98)/(65-68) 97/68  SpO2:  [94 %-96 %] 96 %  Body mass index is 14 33 kg/m²  Input and Output Summary (last 24 hours):     No intake or output data in the 24 hours ending 21    Physical Exam:     Physical Exam  Constitutional:       General: She is not in acute distress  HENT:      Head: Normocephalic and atraumatic  Nose: Nose normal    Eyes:      Conjunctiva/sclera: Conjunctivae normal       Pupils: Pupils are equal, round, and reactive to light  Neck:      Musculoskeletal: Normal range of motion and neck supple  Cardiovascular:      Rate and Rhythm: Regular rhythm  Tachycardia present  Heart sounds: Normal heart sounds  Pulmonary:      Effort: Pulmonary effort is normal  No respiratory distress  Breath sounds: Normal breath sounds  No wheezing or rales  Abdominal:      General: Bowel sounds are normal  There is no distension  Palpations: Abdomen is soft  Tenderness: There is no abdominal tenderness  There is no guarding or rebound  Skin:     General: Skin is warm and dry  Findings: No rash  Neurological:      Mental Status: She is alert  Cranial Nerves: No cranial nerve deficit           Additional Data:     Labs:    Results from last 7 days   Lab Units 21  0619   WBC Thousand/uL 9 61   HEMOGLOBIN g/dL 11 5 HEMATOCRIT % 34 9   PLATELETS Thousands/uL 247   NEUTROS PCT % 89*   LYMPHS PCT % 6*   MONOS PCT % 4   EOS PCT % 0     Results from last 7 days   Lab Units 02/04/21  0619   POTASSIUM mmol/L 4 2   CHLORIDE mmol/L 99*   CO2 mmol/L 26   BUN mg/dL 8   CREATININE mg/dL 0 65   CALCIUM mg/dL 8 4   ALK PHOS U/L 42*   ALT U/L 25   AST U/L 32           * I Have Reviewed All Lab Data Listed Above  * Additional Pertinent Lab Tests Reviewed: Mis 66 Admission Reviewed        Recent Cultures (last 7 days):     Results from last 7 days   Lab Units 02/01/21  0956 02/01/21  0945   BLOOD CULTURE  No Growth After 4 Days  No Growth After 4 Days         Last 24 Hours Medication List:   Current Facility-Administered Medications   Medication Dose Route Frequency Provider Last Rate    acetaminophen  650 mg Oral Q6H PRN [facility] Patee, PA-C      ARIPiprazole  15 mg Oral BID Lonita Patee, PA-C      aspirin  81 mg Oral Daily [facility], PA-C      clonazePAM  0 5 mg Oral BID [facility], PA-C      enoxaparin  30 mg Subcutaneous Q24H [facility], PA-C      ferrous sulfate  325 mg Oral Daily With Breakfast [facility], [place]      [START ON 2/6/2021] hydrocortisone sodium succinate  10 mg Intravenous Daily [facility] Patee, PA-C      hydrocortisone sodium succinate  15 mg Intravenous Early Morning [facility] Patee, PA-C      levothyroxine  50 mcg Oral Early Morning [facility]      multivitamin-minerals  1 tablet Oral Daily [facility] Pate[facility], PA-C      ondansetron  4 mg Intravenous Q6H PRN [facility], [facility]      polyethylene glycol  17 g Oral Daily Lonita Patee, PA-C      potassium-sodium phosphateS  1 tablet Oral TID With Meals [facility], [facility]-C      senna-docusate sodium  1 tablet Oral HS NANI Tesfaye      thiamine  100 mg Oral Daily Lonita Patee, PA-C      traZODone  50 mg Oral HS [facility], [facility]          Today, Patient Was Seen By: Allison Cates MD    ** Please Note: Dictation voice to text software may have been used in the creation of this document   **

## 2021-02-06 NOTE — CASE MANAGEMENT
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

## 2021-02-06 NOTE — PROGRESS NOTES
Progress Note Zenaida Wiseman 1955, 72 y o  female MRN: [id number]    Unit/Bed#: [address] [address] Encounter: [id number]    Primary Care Provider: Leydi Mantilla DO   Date and time admitted to hospital: 2/4/2021  1:25 AM        * Elevated troponin  Assessment & Plan  Patient presented to the ED at [facility] with an elevated troponin peaked at 3 6, currently trending down  Most recent was 0 05  Likely non MI troponin elevation in the setting of stress cardiomyopathy- patient underwent echo which showed an EF of 30%  Cardiology consult appreciated  Patient underwent nuclear stress test  which was markedly abnormal with mild diffuse decrease in uptake in apical and mid walls involving all the walls  LVEF was severely decreased with apical ballooning  Continue medical management as per Cardiology        Hyponatremia  Assessment & Plan  Multifactorial 2/2 poor oral intake, adrenal insufficiency, SIADH  - sodium was 120 on admission and has since improved  - IVFs currently on hold  Sodium level was slightly low but improved to normal level today  Results from last 7 days   Lab Units 02/06/21  0501 02/04/21  0619 02/03/21  0235 02/02/21  1741 02/02/21  0509 02/02/21  0123 02/01/21  2200 02/01/21  1800 02/01/21  0944   SODIUM mmol/L 139 133* 132* 130* 121* 118* 116* 119* 120*       Sinus tachycardia  Assessment & Plan  Heart rate continues to remain uncontrolled  Patient not able to tolerate beta-blocker due to low blood pressure  Patient is being loaded with digoxin as per Cardiology    Generalized weakness  Assessment & Plan  Generalized weakness is multifactorial, hyponatremia has since improved since admission to [facility], and steroid, thyroid medication adjustments  Currently generalized weakness is most likely 2/2 failure to thrive, severe protein calorie malnutrition     - recommendations as below see plan for hyponatremia and hypopituitarism  - PT OT  - case management for discharge planning    Hypertension  Assessment & Plan  Patient's home medications have been on hold due to borderline low blood pressure  Patient was added on low-dose beta-blocker due to sinus tachycardia but could not be tolerated due to borderline low blood pressures    Thyroid disorder  Assessment & Plan  Levothyroxine dose increased to 50 mcg daily    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HCC)  Assessment & Plan  Continue IV hydrocortisone 10 milligram IV daily,  -appreciate further Endocrinology recommendations  - levothyroxine dose was increased to 50 mcg daily  Patient can resume home dose hydrocortisone upon discharge    Severe protein-calorie malnutrition (HCC)  Assessment & Plan  Malnutrition Findings:   Adult Malnutrition type: Chronic illness(Severe protein calorie malnutrition due to inadequate energy intake as evidenced by hollow orbits, depressed temples and protruding clavicles  Treated with Regular diet and supplements) cachexia, no subcutaneous fat, BMI 14  Adult Degree of Malnutrition: Other severe protein calorie malnutrition  BMI Findings:  Adult BMI Classifications: Underweight < 18 5  Body mass index is 14 33 kg/m²  Nutrition consult appreciated   Continue diet with nutritional supplementation    Constipation  Assessment & Plan  Patient did not have a bowel movement in 1 week  Continue MiraLax 17 grams p o  Daily  Patient had a large brown bowel movement with enema    FTT (failure to thrive) in adult  Assessment & Plan  Patient has a history of anorexia nervosa  Continue thiamine, phosphate supplements  PT/OT  Appreciate case management consult for discharge planning      VTE Pharmacologic Prophylaxis:   Pharmacologic: Enoxaparin (Lovenox)  Mechanical VTE Prophylaxis in Place: Yes    Patient Centered Rounds: I have performed bedside rounds with nursing staff today      Discussions with Specialists or Other Care Team Provider: Dr Rayne Alvarado and Discussions with Family / Patient: *yes    Time Spent for Care: 45 minutes  More than 50% of total time spent on counseling and coordination of care as described above  Current Length of Stay: 2 day(s)    Current Patient Status: Inpatient   Certification Statement: The patient will continue to require additional inpatient hospital stay due to Sinus tachycardia    Discharge Plan:  Short-term rehabilitation    Code Status: Level 3 - DNAR and DNI      Subjective:   Patient denies any chest pain, palpitations, shortness of breath  Patient did have a bowel movement yesterday per RN    Objective:     Vitals:   Temp (24hrs), Av °F (36 7 °C), Min:97 6 °F (36 4 °C), Max:98 5 °F (36 9 °C)    Temp:  [97 6 °F (36 4 °C)-98 5 °F (36 9 °C)] 98 °F (36 7 °C)  HR:  [] 63  Resp:  [14-18] 18  BP: ()/(65-77) 106/70  SpO2:  [83 %-100 %] 83 %  Body mass index is 14 33 kg/m²  Input and Output Summary (last 24 hours): Intake/Output Summary (Last 24 hours) at 2021 1804  Last data filed at 2021 1301  Gross per 24 hour   Intake 360 ml   Output 500 ml   Net -140 ml       Physical Exam:     Physical Exam  Constitutional:       General: She is not in acute distress  HENT:      Head: Normocephalic and atraumatic  Nose: Nose normal    Eyes:      Conjunctiva/sclera: Conjunctivae normal       Pupils: Pupils are equal, round, and reactive to light  Neck:      Musculoskeletal: Normal range of motion and neck supple  Cardiovascular:      Rate and Rhythm: Regular rhythm  Tachycardia present  Heart sounds: Normal heart sounds  Pulmonary:      Effort: Pulmonary effort is normal  No respiratory distress  Breath sounds: Normal breath sounds  No wheezing or rales  Abdominal:      General: Bowel sounds are normal  There is no distension  Palpations: Abdomen is soft  Tenderness: There is no abdominal tenderness  There is no guarding or rebound  Skin:     General: Skin is warm and dry  Findings: No rash     Neurological: Mental Status: She is alert  Cranial Nerves: No cranial nerve deficit  Additional Data:     Labs:    Results from last 7 days   Lab Units 02/04/21  0619   WBC Thousand/uL 9 61   HEMOGLOBIN g/dL 11 5   HEMATOCRIT % 34 9   PLATELETS Thousands/uL 247   NEUTROS PCT % 89*   LYMPHS PCT % 6*   MONOS PCT % 4   EOS PCT % 0     Results from last 7 days   Lab Units 02/06/21  0501 02/04/21  0619   POTASSIUM mmol/L 3 3* 4 2   CHLORIDE mmol/L 101 99*   CO2 mmol/L 31 26   BUN mg/dL 7 8   CREATININE mg/dL 0 42* 0 65   CALCIUM mg/dL 8 5 8 4   ALK PHOS U/L  --  42*   ALT U/L  --  25   AST U/L  --  32           * I Have Reviewed All Lab Data Listed Above  * Additional Pertinent Lab Tests Reviewed: Mis 66 Admission Reviewed        Recent Cultures (last 7 days):     Results from last 7 days   Lab Units 02/01/21  0956 02/01/21  0945   BLOOD CULTURE  No Growth After 5 Days  No Growth After 5 Days         Last 24 Hours Medication List:   Current Facility-Administered Medications   Medication Dose Route Frequency Provider Last Rate    acetaminophen  650 mg Oral Q6H PRN Feroz Ford PA-C      ARIPiprazole  15 mg Oral BID Feroz Ford PA-C      aspirin  81 mg Oral Daily Feroz Ford PA-C      clonazePAM  0 5 mg Oral BID Feroz Ford PA-C      [START ON 2/7/2021] digoxin  125 mcg Oral Daily Navtej Nikki, DO      digoxin  250 mcg Oral Q6H Navtej Nikki, DO      enoxaparin  30 mg Subcutaneous Q24H [address] Feroz Ford PA-C      ferrous sulfate  325 mg Oral Daily With Breakfast Feroz Ford PA-C      hydrocortisone sodium succinate  10 mg Intravenous Daily Feroz Ford PA-C      hydrocortisone sodium succinate  15 mg Intravenous Early Morning Feroz Ford PA-C      levothyroxine  50 mcg Oral Early Morning Feroz Ford PA-C      multivitamin-minerals  1 tablet Oral Daily Feroz Ford PA-C      ondansetron  4 mg Intravenous Q6H PRN Feroz Ford PA-C      polyethylene glycol  17 g Oral Daily Pedro Luis Alva PA-C      potassium-sodium phosphateS  1 tablet Oral TID With Meals Pedro Luis Alva PA-C      senna-docusate sodium  1 tablet Oral HS NANI Langford      thiamine  100 mg Oral Daily Pedro Luis Alva PA-C      traZODone  50 mg Oral HS Pedro Luis Alva PA-C          Today, Patient Was Seen By: Rupinder Norris MD    ** Please Note: Dictation voice to text software may have been used in the creation of this document   **

## 2021-02-06 NOTE — ASSESSMENT & PLAN NOTE
Patient did not have a bowel movement in 1 week  Continue MiraLax 17 grams p o   Daily  Patient ordered for soapsuds enema

## 2021-02-07 NOTE — ASSESSMENT & PLAN NOTE
Patient did not have a bowel movement in 1 week  Continue MiraLax 17 grams p o   Daily  Patient had a large brown bowel movement with enema on 02/05

## 2021-02-07 NOTE — PLAN OF CARE
Problem: Potential for Falls  Goal: Patient will remain free of falls  Description: INTERVENTIONS:  - Assess patient frequently for physical needs  -  Identify cognitive and physical deficits and behaviors that affect risk of falls    -  [place] fall precautions as indicated by assessment   - Educate patient/family on patient safety including physical limitations  - Instruct patient to call for assistance with activity based on assessment  - Modify environment to reduce risk of injury  - Consider OT/PT consult to assist with strengthening/mobility  Outcome: Progressing     Problem: Prexisting or High Potential for Compromised Skin Integrity  Goal: Skin integrity is maintained or improved  Description: INTERVENTIONS:  - Identify patients at risk for skin breakdown  - Assess and monitor skin integrity  - Assess and monitor nutrition and hydration status  - Monitor labs   - Assess for incontinence   - Turn and reposition patient  - Assist with mobility/ambulation  - Relieve pressure over bony prominences  - Avoid friction and shearing  - Provide appropriate hygiene as needed including keeping skin clean and dry  - Evaluate need for skin moisturizer/barrier cream  - Collaborate with interdisciplinary team   - Patient/family teaching  - Consider wound care consult   Outcome: Progressing     Problem: PAIN - ADULT  Goal: Verbalizes/displays adequate comfort level or baseline comfort level  Description: Interventions:  - Encourage patient to monitor pain and request assistance  - Assess pain using appropriate pain scale  - Administer analgesics based on type and severity of pain and evaluate response  - Implement non-pharmacological measures as appropriate and evaluate response  - Consider cultural and social influences on pain and pain management  - Notify physician/advanced practitioner if interventions unsuccessful or patient reports new pain  Outcome: Progressing     Problem: INFECTION - ADULT  Goal: Absence or prevention of progression during hospitalization  Description: INTERVENTIONS:  - Assess and monitor for signs and symptoms of infection  - Monitor lab/diagnostic results  - Monitor all insertion sites, i e  indwelling lines, tubes, and drains  - Monitor endotracheal if appropriate and nasal secretions for changes in amount and color  - [place] appropriate cooling/warming therapies per order  - Administer medications as ordered  - Instruct and encourage patient and family to use good hand hygiene technique  - Identify and instruct in appropriate isolation precautions for identified infection/condition  Outcome: Progressing     Problem: SAFETY ADULT  Goal: Patient will remain free of falls  Description: INTERVENTIONS:  - Assess patient frequently for physical needs  -  Identify cognitive and physical deficits and behaviors that affect risk of falls    -  [place] fall precautions as indicated by assessment   - Educate patient/family on patient safety including physical limitations  - Instruct patient to call for assistance with activity based on assessment  - Modify environment to reduce risk of injury  - Consider OT/PT consult to assist with strengthening/mobility  Outcome: Progressing  Goal: Maintain or return to baseline ADL function  Description: INTERVENTIONS:  -  Assess patient's ability to carry out ADLs; assess patient's baseline for ADL function and identify physical deficits which impact ability to perform ADLs (bathing, care of mouth/teeth, toileting, grooming, dressing, etc )  - Assess/evaluate cause of self-care deficits   - Assess range of motion  - Assess patient's mobility; develop plan if impaired  - Assess patient's need for assistive devices and provide as appropriate  - Encourage maximum independence but intervene and supervise when necessary  - Involve family in performance of ADLs  - Assess for home care needs following discharge   - Consider OT consult to assist with ADL evaluation and planning for discharge  - Provide patient education as appropriate  Outcome: Progressing  Goal: Maintain or return mobility status to optimal level  Description: INTERVENTIONS:  - Assess patient's baseline mobility status (ambulation, transfers, stairs, etc )    - Identify cognitive and physical deficits and behaviors that affect mobility  - Identify mobility aids required to assist with transfers and/or ambulation (gait belt, sit-to-stand, lift, walker, cane, etc )  - [place] fall precautions as indicated by assessment  - Record patient progress and toleration of activity level on Mobility SBAR; progress patient to next Phase/Stage  - Instruct patient to call for assistance with activity based on assessment  - Consider rehabilitation consult to assist with strengthening/weightbearing, etc   Outcome: Progressing     Problem: DISCHARGE PLANNING  Goal: Discharge to home or other facility with appropriate resources  Description: INTERVENTIONS:  - Identify barriers to discharge w/patient and caregiver  - Arrange for needed discharge resources and transportation as appropriate  - Identify discharge learning needs (meds, wound care, etc )  - Arrange for interpretive services to assist at discharge as needed  - Refer to Case Management Department for coordinating discharge planning if the patient needs post-hospital services based on physician/advanced practitioner order or complex needs related to functional status, cognitive ability, or social support system  Outcome: Progressing     Problem: Knowledge Deficit  Goal: Patient/family/caregiver demonstrates understanding of disease process, treatment plan, medications, and discharge instructions  Description: Complete learning assessment and assess knowledge base    Interventions:  - Provide teaching at level of understanding  - Provide teaching via preferred learning methods  Outcome: Progressing     Problem: CARDIOVASCULAR - ADULT  Goal: Maintains optimal cardiac output and hemodynamic stability  Description: INTERVENTIONS:  - Monitor I/O, vital signs and rhythm  - Monitor for S/S and trends of decreased cardiac output  - Administer and titrate ordered vasoactive medications to optimize hemodynamic stability  - Assess quality of pulses, skin color and temperature  - Assess for signs of decreased coronary artery perfusion  - Instruct patient to report change in severity of symptoms  Outcome: Progressing  Goal: Absence of cardiac dysrhythmias or at baseline rhythm  Description: INTERVENTIONS:  - Continuous cardiac monitoring, vital signs, obtain 12 lead EKG if ordered  - Administer antiarrhythmic and heart rate control medications as ordered  - Monitor electrolytes and administer replacement therapy as ordered  Outcome: Progressing     Problem: Nutrition/Hydration-ADULT  Goal: Nutrient/Hydration intake appropriate for improving, restoring or maintaining nutritional needs  Description: Monitor and assess patient's nutrition/hydration status for malnutrition  Collaborate with interdisciplinary team and initiate plan and interventions as ordered  Monitor patient's weight and dietary intake as ordered or per policy  Utilize nutrition screening tool and intervene as necessary  Determine patient's food preferences and provide high-protein, high-caloric foods as appropriate       INTERVENTIONS:  - Monitor oral intake, urinary output, labs, and treatment plans  - Assess nutrition and hydration status and recommend course of action  - Evaluate amount of meals eaten  - Assist patient with eating if necessary   - Allow adequate time for meals  - Recommend/ encourage appropriate diets, oral nutritional supplements, and vitamin/mineral supplements  - Order, calculate, and assess calorie counts as needed  - Recommend, monitor, and adjust tube feedings and TPN/PPN based on assessed needs  - Assess need for intravenous fluids  - Provide specific nutrition/hydration education as appropriate  - Include patient/family/caregiver in decisions related to nutrition  Outcome: Progressing

## 2021-02-07 NOTE — ASSESSMENT & PLAN NOTE
Multifactorial 2/2 poor oral intake, adrenal insufficiency, SIADH  - sodium was 120 on admission and has since improved  - IVFs currently on hold  Sodium level was slightly low but normalized over the past 2 days  Results from last 7 days   Lab Units 02/07/21  0528 02/06/21  0501 02/04/21  5029 02/03/21  0235 02/02/21  1741 02/02/21  0509 02/02/21  0123 02/01/21  2200 02/01/21  1800 02/01/21  0944   SODIUM mmol/L 138 139 133* 132* 130* 121* 118* 116* 119* 120*

## 2021-02-07 NOTE — ASSESSMENT & PLAN NOTE
Heart rate continues to remain uncontrolled  Patient not able to tolerate beta-blocker due to low blood pressure  Patient was loaded with digoxin and continued on digoxin 125 micrograms p o  Daily  Continue to monitor on telemetry

## 2021-02-07 NOTE — ASSESSMENT & PLAN NOTE
Generalized weakness is multifactorial, hyponatremia has since improved since admission to [facility], and steroid, thyroid medication adjustments  Currently generalized weakness is most likely 2/2 failure to thrive, severe protein calorie malnutrition     - recommendations as below see plan for hyponatremia and hypopituitarism  - PT OT  - case management for discharge planning

## 2021-02-07 NOTE — ASSESSMENT & PLAN NOTE
Patient presented to the ED at [facility] with an elevated troponin peaked at 3 6, currently trending down  Most recent was 0 05  Likely non MI troponin elevation in the setting of stress cardiomyopathy- patient underwent echo which showed an EF of 30%  Cardiology consult appreciated  Patient underwent nuclear stress test  which was markedly abnormal with mild diffuse decrease in uptake in apical and mid walls involving all the walls    LVEF was severely decreased with apical ballooning  Continue medical management as per Cardiology

## 2021-02-07 NOTE — PROGRESS NOTES
Progress Note Makeda Butterfield 1955, 72 y o  female MRN: [id number]    Unit/Bed#: [address] [address] Encounter: [id number]    Primary Care Provider: Lindsey Beavers DO   Date and time admitted to hospital: 2/4/2021  1:25 AM        * Elevated troponin  Assessment & Plan  Patient presented to the ED at [place] with an elevated troponin peaked at 3 6, currently trending down  Most recent was 0 05  Likely non MI troponin elevation in the setting of stress cardiomyopathy- patient underwent echo which showed an EF of 30%  Cardiology consult appreciated  Patient underwent nuclear stress test  which was markedly abnormal with mild diffuse decrease in uptake in apical and mid walls involving all the walls  LVEF was severely decreased with apical ballooning  Continue medical management as per Cardiology        Hyponatremia  Assessment & Plan  Multifactorial 2/2 poor oral intake, adrenal insufficiency, SIADH  - sodium was 120 on admission and has since improved  - IVFs currently on hold  Sodium level was slightly low but normalized over the past 2 days  Results from last 7 days   Lab Units 02/07/21  0528 02/06/21  0501 02/04/21  0619 02/03/21  0235 02/02/21  1741 02/02/21  0509 02/02/21  0123 02/01/21  2200 02/01/21  1800 02/01/21  0944   SODIUM mmol/L 138 139 133* 132* 130* 121* 118* 116* 119* 120*       Sinus tachycardia  Assessment & Plan  Heart rate continues to remain uncontrolled  Patient not able to tolerate beta-blocker due to low blood pressure  Patient was loaded with digoxin and continued on digoxin 125 micrograms p o  Daily  Continue to monitor on telemetry  Generalized weakness  Assessment & Plan  Generalized weakness is multifactorial, hyponatremia has since improved since admission to [facility], and steroid, thyroid medication adjustments  Currently generalized weakness is most likely 2/2 failure to thrive, severe protein calorie malnutrition     - recommendations as below see plan for hyponatremia and hypopituitarism  - PT OT  - case management for discharge planning    Hypertension  Assessment & Plan  Patient's home medications have been on hold due to borderline low blood pressure  Patient was added on low-dose beta-blocker due to sinus tachycardia but could not be tolerated due to borderline low blood pressures    Thyroid disorder  Assessment & Plan  Levothyroxine dose increased to 50 mcg daily    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HCC)  Assessment & Plan  Continue IV hydrocortisone 10 milligram IV daily,  -appreciate further Endocrinology recommendations  - levothyroxine dose was increased to 50 mcg daily  Patient can resume home dose hydrocortisone upon discharge    Severe protein-calorie malnutrition (HCC)  Assessment & Plan  Malnutrition Findings:   Adult Malnutrition type: Chronic illness(Severe protein calorie malnutrition due to inadequate energy intake as evidenced by hollow orbits, depressed temples and protruding clavicles  Treated with Regular diet and supplements) cachexia, no subcutaneous fat, BMI 14  Adult Degree of Malnutrition: Other severe protein calorie malnutrition  BMI Findings:  Adult BMI Classifications: Underweight < 18 5  Body mass index is 14 38 kg/m²  Nutrition consult appreciated   Continue diet with nutritional supplementation    Constipation  Assessment & Plan  Patient did not have a bowel movement in 1 week  Continue MiraLax 17 grams p o  Daily  Patient had a large brown bowel movement with enema on 02/05    FTT (failure to thrive) in adult  Assessment & Plan  Patient has a history of anorexia nervosa  Continue thiamine, phosphate supplements  PT/OT  Appreciate case management consult for discharge planning        VTE Pharmacologic Prophylaxis:   Pharmacologic: Enoxaparin (Lovenox)  Mechanical VTE Prophylaxis in Place: Yes    Patient Centered Rounds: I have performed bedside rounds with nursing staff today      Discussions with Specialists or Other Care Team Provider: Yes    Education and Discussions with Family / Patient: yes    Time Spent for Care: 30 minutes  More than 50% of total time spent on counseling and coordination of care as described above  Current Length of Stay: 3 day(s)    Current Patient Status: Inpatient   Certification Statement: The patient will continue to require additional inpatient hospital stay due to Sinus tachycardia    Discharge Plan:  Short-term rehabilitation    Code Status: Level 3 - DNAR and DNI      Subjective:   Patient denies any chest pain, shortness of breath, palpitations, headache or dizziness  Objective:     Vitals:   Temp (24hrs), Av 1 °F (36 7 °C), Min:97 2 °F (36 2 °C), Max:98 8 °F (37 1 °C)    Temp:  [97 2 °F (36 2 °C)-98 8 °F (37 1 °C)] 98 8 °F (37 1 °C)  HR:  [] 95  Resp:  [12-17] 16  BP: ()/(68-81) 98/68  SpO2:  [94 %-97 %] 97 %  Body mass index is 14 38 kg/m²  Input and Output Summary (last 24 hours): Intake/Output Summary (Last 24 hours) at 2021 1614  Last data filed at 2021 1901  Gross per 24 hour   Intake 120 ml   Output --   Net 120 ml       Physical Exam:     Physical Exam  Constitutional:       General: She is not in acute distress  Comments: Patient is frail   HENT:      Head: Normocephalic and atraumatic  Nose: Nose normal    Eyes:      Conjunctiva/sclera: Conjunctivae normal       Pupils: Pupils are equal, round, and reactive to light  Neck:      Musculoskeletal: Normal range of motion and neck supple  Cardiovascular:      Rate and Rhythm: Regular rhythm  Tachycardia present  Heart sounds: Normal heart sounds  Pulmonary:      Effort: Pulmonary effort is normal  No respiratory distress  Breath sounds: Normal breath sounds  No wheezing or rales  Abdominal:      General: Bowel sounds are normal  There is no distension  Palpations: Abdomen is soft  Tenderness: There is no abdominal tenderness  There is no guarding or rebound     Skin: General: Skin is warm and dry  Findings: No rash  Neurological:      Mental Status: She is alert  Cranial Nerves: No cranial nerve deficit  Additional Data:     Labs:    Results from last 7 days   Lab Units 02/04/21  0619   WBC Thousand/uL 9 61   HEMOGLOBIN g/dL 11 5   HEMATOCRIT % 34 9   PLATELETS Thousands/uL 247   NEUTROS PCT % 89*   LYMPHS PCT % 6*   MONOS PCT % 4   EOS PCT % 0     Results from last 7 days   Lab Units 02/07/21  0528  02/04/21  0619   POTASSIUM mmol/L 4 2   < > 4 2   CHLORIDE mmol/L 102   < > 99*   CO2 mmol/L 31   < > 26   BUN mg/dL 7   < > 8   CREATININE mg/dL 0 53*   < > 0 65   CALCIUM mg/dL 8 2*   < > 8 4   ALK PHOS U/L  --   --  42*   ALT U/L  --   --  25   AST U/L  --   --  32    < > = values in this interval not displayed  * I Have Reviewed All Lab Data Listed Above  * Additional Pertinent Lab Tests Reviewed: Mis 66 Admission Reviewed        Recent Cultures (last 7 days):     Results from last 7 days   Lab Units 02/01/21  0956 02/01/21  0945   BLOOD CULTURE  No Growth After 5 Days  No Growth After 5 Days         Last 24 Hours Medication List:   Current Facility-Administered Medications   Medication Dose Route Frequency Provider Last Rate    acetaminophen  650 mg Oral Q6H PRN Juan Daniel Boyle PA-C      ARIPiprazole  15 mg Oral BID Juan Daniel Boyle PA-C      aspirin  81 mg Oral Daily Juan Daniel Boyle PA-C      clonazePAM  0 5 mg Oral BID Juan Daniel Boyle PA-C      digoxin  125 mcg Oral Daily Toan Jordan DO      enoxaparin  30 mg Subcutaneous Q24H [facility] Juan Daniel Boyle PA-C      ferrous sulfate  325 mg Oral Daily With Breakfast Juan Daniel Boyle PA-C      hydrocortisone sodium succinate  10 mg Intravenous Daily Juan Daniel Boyle PA-C      hydrocortisone sodium succinate  15 mg Intravenous Early Morning Juan Daniel Boyle PA-C      levothyroxine  50 mcg Oral Early Morning Juan Daniel Boyle PA-C      multivitamin-minerals  1 tablet Oral Daily [employer] Berenice Howell PA-C      ondansetron  4 mg Intravenous Q6H PRN Pedro Luis Alva PA-C      polyethylene glycol  17 g Oral Daily Pedro Luis Alva PA-C      potassium-sodium phosphateS  1 tablet Oral TID With Meals Pedro Luis Alva PA-C      senna-docusate sodium  1 tablet Oral HS MICKEY LangfordIKE      thiamine  100 mg Oral Daily Pedro Luis Alav PA-C      traZODone  50 mg Oral HS Pedro Luis Alva PA-C          Today, Patient Was Seen By: Rupinder Norris MD    ** Please Note: Dictation voice to text software may have been used in the creation of this document   **

## 2021-02-07 NOTE — PROGRESS NOTES
Daily Cardiology Progress Note              LOS: 3 days     Assessment/Plan     Principal Problem:    Elevated troponin  Active Problems:    Hyponatremia    FTT (failure to thrive) in adult    Constipation    Severe protein-calorie malnutrition (HCC)    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HCC)    Thyroid disorder    Hypertension    Generalized weakness    Sinus tachycardia    1  Elevated troponin likely non-MI troponin elevation due to stress cardiomyopathy  Echocardiogram reduced to 30%  Nuclear stress test showed large fixed defect of the apex  Ejection fraction was 20% on nuclear imaging   - she has no symptoms of congestive heart failure  - could not use metoprolol as she has chronic hypotension  - Started on digoxin - may continue low dose  2  Sinus tachycardia - could not tolerate low dose metoprolol due to hypotension   - started on digoxin - monitor on telemetry for additional 24 hours  3  Anorexia with BMI of 14      Subjective     Interval History: No chest pain or shortness of breath  Started on Digoxin yesterday    Objective     Vital signs in last 24 hours:  Temp:  [97 2 °F (36 2 °C)-98 3 °F (36 8 °C)] 97 9 °F (36 6 °C)  HR:  [] 107  Resp:  [12-18] 17  BP: ()/(69-81) 106/75    Intake/Output last 3 shifts:  I/O last 3 completed shifts: In: 480 [P O :480]  Out: 500 [Urine:500]  Intake/Output this shift:  No intake/output data recorded  Physical Exam:  Physical Exam   Constitutional: She appears cachectic  No distress  Eyes: Pupils are equal, round, and reactive to light  Conjunctivae are normal    Neck: Normal range of motion  Neck supple  No JVD present  Cardiovascular: Normal rate, regular rhythm and normal heart sounds  Exam reveals no gallop and no friction rub  No murmur heard  Pulmonary/Chest: Effort normal and breath sounds normal  She has no wheezes  She has no rales  Musculoskeletal:         General: No tenderness, deformity or edema     Neurological: She is alert and oriented to person, place, and time  Skin: Skin is warm and dry  Lab Results: I have personally reviewed pertinent lab results  Imaging: I have personally reviewed pertinent films in PACS  EKG/Tele: Reviewed    HR this AM [id number]

## 2021-02-08 PROBLEM — I95.9 HYPOTENSION: Status: ACTIVE | Noted: 2020-01-01

## 2021-02-08 NOTE — PROGRESS NOTES
Progress Note - Cardiology   [facility]     Henrik Redmond 72 y o  female MRN: [id number]  : 1955  Unit/Bed#: [address] [place] [address] Encounter: [id number]    Assessment and Plan:   1  New onset cardiomyopathy:  EF 25-30% concerning for takotsubo cardiomyopathy  -  appears patient has had longstanding tachycardia, concerning for component of tachycardia induced cardiomyopathy    -  trialed on low-dose Lopressor at 12 5 mg twice a day and patient did not tolerate due to low blood pressure     -  patient started on digoxin on 2021    -  check digoxin level in 1 week    -  patient will need repeat echocardiogram in 2-3 months to re-evaluate EF     2  Abnormal troponins question non MI troponin elevation due to stress, anorexia and tachycardia verses MI type 2 with coronary blockages    -  Lexiscan nuclear stress test identified area of fixed defect coordinating to the area of akinesis on her echocardiogram     -  patient unable to tolerate low-dose Lopressor as noted above    -  patient is not a good candidate for aggressive Rx  Condition guarded     3  Tachycardia:  Sinus   Appears to be chronic and question whether reflexive due to her anorexia and decreased cardiac output       -  patient did not tolerate AV juana blocking medication (beta-blocker)    -  patient started on digoxin on 2021    -  check digoxin level in 1 week    4  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      5  Anorexia and PTSD:  BMI 14 3 concerning for failure to thrive    Subjective / Objective:   Patient seen examined  She complains of dizziness  This can occur both lying and standing  Orthostatic vital signs performed and they are negative  Patient with guarded prognosis due to her extensive anorexia, low EF and failure to thrive      Vitals: Blood pressure 98/69, pulse 100, temperature 98 °F (36 7 °C), temperature source Tympanic, resp  rate 18, height 4' 10" (1 473 m), weight 33 1 kg (73 lb), SpO2 94 %  Vitals:    02/07/21 0532 02/08/21 0600   Weight: 31 2 kg (68 lb 12 5 oz) 33 1 kg (73 lb)     Body mass index is 15 26 kg/m²  BP Readings from Last 3 Encounters:   02/08/21 98/69   02/03/21 97/78   09/28/20 91/60     Orthostatic Blood Pressures      Most Recent Value   Blood Pressure  98/69 filed at 02/08/2021 1236   Patient Position - Orthostatic VS  Standing - Orthostatic VS filed at 02/08/2021 1236        I/O       02/06 0701 - 02/07 0700 02/07 0701 - 02/08 0700 02/08 0701 - 02/09 0700    P  O  480 660     Total Intake(mL/kg) 480 (15 4) 660 (19 9)     Urine (mL/kg/hr)  500 (0 6)     Total Output  500     Net +480 +160                Invasive Devices     Peripheral Intravenous Line            Peripheral IV 02/04/21 3 days                  Intake/Output Summary (Last 24 hours) at 2/8/2021 1325  Last data filed at 2/8/2021 0431  Gross per 24 hour   Intake 240 ml   Output 500 ml   Net -260 ml         Physical Exam:   Physical Exam  Vitals signs and nursing note reviewed  Constitutional:       Appearance: Normal appearance  She is well-developed  She is cachectic  HENT:      Head: Normocephalic  Right Ear: External ear normal       Left Ear: External ear normal       Nose: Nose normal    Eyes:      General: No scleral icterus  Right eye: No discharge  Left eye: No discharge  Pupils: Pupils are equal, round, and reactive to light  Neck:      Musculoskeletal: Normal range of motion and neck supple  Thyroid: No thyromegaly  Cardiovascular:      Rate and Rhythm: Regular rhythm  Tachycardia present  Pulses: Normal pulses  Heart sounds: Normal heart sounds  Pulmonary:      Effort: Pulmonary effort is normal  No respiratory distress  Breath sounds: Normal breath sounds  No wheezing, rhonchi or rales  Abdominal:      General: Bowel sounds are normal  There is no distension        Palpations: Abdomen is soft  Musculoskeletal:      Right lower leg: No edema  Left lower leg: No edema  Skin:     General: Skin is warm and dry  Capillary Refill: Capillary refill takes less than 2 seconds  Neurological:      General: No focal deficit present  Mental Status: She is alert  Mental status is at baseline                     Medications/ Allergies:     Current Facility-Administered Medications   Medication Dose Route Frequency Provider Last Rate    acetaminophen  650 mg Oral Q6H PRN Jeremy Ugarte, PA-C      ARIPiprazole  15 mg Oral BID Rolanda Shanel, PA-C      aspirin  81 mg Oral Daily Rolanda Shanel, PA-C      clonazePAM  0 5 mg Oral BID Stana Shanel, PA-C      digoxin  125 mcg Oral Daily Toan Jordan DO      enoxaparin  30 mg Subcutaneous Q24H [address] Jeremy Ugarte, PA-C      ferrous sulfate  325 mg Oral Daily With Breakfast Jeremy Ugarte, PA-C      hydrocortisone sodium succinate  10 mg Intravenous Daily Jeremy Ugarte, PA-C      hydrocortisone sodium succinate  15 mg Intravenous Early Morning Jeremy Ugarte, PA-C      levothyroxine  50 mcg Oral Early Morning Jeremy Ugarte, PA-C      multivitamin-minerals  1 tablet Oral Daily Jeremy Ugarte, PA-C      ondansetron  4 mg Intravenous Q6H PRN Jeremy Ugarte, PA-C      polyethylene glycol  17 g Oral Daily Roladna Shanel, PA-C      potassium-sodium phosphateS  1 tablet Oral TID With Meals Jeremy Ugarte, PA-C      senna-docusate sodium  1 tablet Oral HS [place] Coma, CRNP      thiamine  100 mg Oral Daily Jeremy Ugarte, PA-C      traZODone  50 mg Oral HS Stana Shanel, PA-C       acetaminophen, 650 mg, Q6H PRN  ondansetron, 4 mg, Q6H PRN      Allergies   Allergen Reactions    Codeine     Penicillins     Sulfa Antibiotics        VTE Pharmacologic Prophylaxis:   Sequential compression device (Venodyne)     Labs:   Troponins:  Results from last 7 days   Lab Units 02/04/21  0914 02/04/21  0619 02/03/21  0138   TROPONIN I ng/mL 0 25* 0 28* 0 51*     CBC with diff:  Results from last 7 days   Lab Units 02/04/21  0619 02/02/21  0507   WBC Thousand/uL 9 61 5 09   HEMOGLOBIN g/dL 11 5 11 4*   HEMATOCRIT % 34 9 32 7*   MCV fL 84 79*   PLATELETS Thousands/uL 247 210   MCH pg 27 5 27 7   MCHC g/dL 33 0 34 9   RDW % 13 8 13 4   MPV fL 10 6 10 4   NRBC AUTO /100 WBCs 0  --      CMP:  Results from last 7 days   Lab Units 02/07/21  0528 02/06/21  0501 02/04/21  0619 02/03/21  0235 02/02/21  1741 02/02/21  0509 02/02/21  0123   SODIUM mmol/L 138 139 133* 132* 130* 121* 118*   POTASSIUM mmol/L 4 2 3 3* 4 2 3 7 3 4* 4 1 4 5   CHLORIDE mmol/L 102 101 99* 100 96 91* 90*   CO2 mmol/L 31 31 26 25 25 22 22   ANION GAP mmol/L 5 7 8 7 9 8 6   BUN mg/dL 7 7 8 3* 30* 3* 3*   CREATININE mg/dL 0 53* 0 42* 0 65 0 39* 0 90 0 38* 0 38*   CALCIUM mg/dL 8 2* 8 5 8 4 7 6* 8 1* 7 3* 7 2*   AST U/L  --   --  32  --   --   --   --    ALT U/L  --   --  25  --   --   --   --    ALK PHOS U/L  --   --  42*  --   --   --   --    TOTAL PROTEIN g/dL  --   --  5 8*  --   --   --   --    ALBUMIN g/dL  --   --  2 9*  --   --   --   --    TOTAL BILIRUBIN mg/dL  --   --  0 20  --   --   --   --    EGFR ml/min/1 73sq m 100 108 93 111 67 112 112     Magnesium:  Results from last 7 days   Lab Units 02/04/21  0619 02/02/21  0509   MAGNESIUM mg/dL 2 0 1 9       Imaging & Testing   I have personally reviewed pertinent reports  Xr Chest 1 View Portable    Result Date: 2/1/2021  Narrative: CHEST INDICATION:   Weakness  Patient has suspected COVID-19  COMPARISON:  Chest radiograph from 4/3/2011  Thoracic spine CT from today which includes a large portion of the lungs  EXAM PERFORMED/VIEWS:  XR CHEST PORTABLE FINDINGS: Cardiomediastinal silhouette appears unremarkable  The lungs are clear  No pneumothorax or pleural effusion  Healed left rib fractures  Impression: No acute cardiopulmonary disease   Workstation performed: [id number]     Ct Head Without Contrast    Result Date: 2/1/2021  Narrative: CT BRAIN - WITHOUT CONTRAST INDICATION:   Trauma  COMPARISON:  4/3/2011  TECHNIQUE:  CT examination of the brain was performed  In addition to axial images, sagittal and coronal 2D reformatted images were created and submitted for interpretation  Radiation dose length product (DLP) for this visit:  843 3 mGy-cm   This examination, like all CT scans performed in the [facility], was performed utilizing techniques to minimize radiation dose exposure, including the use of iterative reconstruction and automated exposure control  IMAGE QUALITY:  Diagnostic  FINDINGS: PARENCHYMA:  No intracranial mass, mass effect or midline shift  No CT signs of acute infarction  No acute parenchymal hemorrhage  VENTRICLES AND EXTRA-AXIAL SPACES:  Normal for the patient's age  VISUALIZED ORBITS AND PARANASAL SINUSES:  Unremarkable  CALVARIUM AND EXTRACRANIAL SOFT TISSUES:  Normal      Impression: No acute intracranial abnormality  Workstation performed: [id number]     Ct Cervical Spine Without Contrast    Result Date: 2/1/2021  Narrative: CT CERVICAL SPINE - WITHOUT CONTRAST INDICATION:   Trauma  COMPARISON:  None  TECHNIQUE:  CT examination of the cervical spine was performed without intravenous contrast   Contiguous axial images were obtained  Sagittal and coronal reconstructions were performed  Radiation dose length product (DLP) for this visit:  128 5 mGy-cm   This examination, like all CT scans performed in the [facility], was performed utilizing techniques to minimize radiation dose exposure, including the use of iterative reconstruction and automated exposure control  IMAGE QUALITY:  Diagnostic  FINDINGS: ALIGNMENT:  Normal alignment of the cervical spine  No subluxation  VERTEBRAL BODIES:  No fracture  DEGENERATIVE CHANGES:  No significant cervical degenerative changes are noted  PREVERTEBRAL AND PARASPINAL SOFT TISSUES:  Unremarkable   THORACIC INLET:  Normal      Impression: No cervical spine fracture or traumatic malalignment  Workstation performed: [id number]     Ct Thoracic Spine Without Contrast    Result Date: 2/1/2021  Narrative: CT THORACIC AND LUMBAR SPINE INDICATION:   Trauma  COMPARISON:  None  TECHNIQUE:  Contiguous axial images were obtained  Sagittal and coronal reconstructions were performed  Radiation dose length product (DLP) for this visit:  252 8 mGy-cm  (accession [id number]), 0 mGy-cm  (accession [id number])  This examination, like all CT scans performed in the [facility], was performed utilizing techniques to minimize radiation dose exposure, including the use of iterative reconstruction and automated exposure control  IMAGE QUALITY:  Diagnostic  FINDINGS: ALIGNMENT:  Normal alignment of the thoracolumbar spine  No subluxation  VERTEBRAL BODIES: No fracture  DEGENERATIVE CHANGES:  No significant degenerative change is identified  PARASPINAL SOFT TISSUES: Normal      Impression: No fracture or malalignment identified  Workstation performed: [id number]     Ct Lumbar Spine Without Contrast    Result Date: 2/1/2021  Narrative: CT THORACIC AND LUMBAR SPINE INDICATION:   Trauma  COMPARISON:  None  TECHNIQUE:  Contiguous axial images were obtained  Sagittal and coronal reconstructions were performed  Radiation dose length product (DLP) for this visit:  252 8 mGy-cm  (accession [id number]), 0 mGy-cm  (accession [id number])  This examination, like all CT scans performed in the [facility], was performed utilizing techniques to minimize radiation dose exposure, including the use of iterative reconstruction and automated exposure control  IMAGE QUALITY:  Diagnostic  FINDINGS: ALIGNMENT:  Normal alignment of the thoracolumbar spine  No subluxation  VERTEBRAL BODIES: No fracture  DEGENERATIVE CHANGES:  No significant degenerative change is identified  PARASPINAL SOFT TISSUES: Normal      Impression: No fracture or malalignment identified   Workstation performed: [id number]        EKG / Monitor: Personally reviewed  Sinus tachycardia    Cardiac testing:   Results for orders placed during the hospital encounter of 21   Echo complete with contrast if indicated    Narrative [address]    Transthoracic Echocardiogram  2D, M-mode, Doppler, and Color Doppler    Study date:  2021    Patient: Kamar Sutton  MR number: [id number]  Account number: [de-identified]  : 1955  Age: 72 years  Gender: Female  Status: Inpatient  Location: [place]  Height: 59 in  Weight: 70 lb  BP: 95/ 73 mmHg    Indications: Cardiomyopathy    Diagnoses: I42 9 - Cardiomyopathy, unspecified    Sonographer:  MARTHA Byers  Referring Physician:  Ghassan Pichardo MD  Group:  [id number]  Interpreting Physician:  Anthony Mendoza MD    SUMMARY    LEFT VENTRICLE:  Systolic function was severely reduced by visual assessment  Ejection fraction was estimated in the range of 25 % to 30 %  There was of the mid-apical anterior, mid anteroseptal, mid inferoseptal, mid-apical inferior, apical septal, apical lateral, and apical wall(s)  This pattern is very suggestive of a stress induced cardiomyopathy  RIGHT VENTRICLE:  The size was normal   Systolic function was normal     MITRAL VALVE:  There was moderate regurgitation  AORTIC VALVE:  There was trace regurgitation  TRICUSPID VALVE:  There was mild regurgitation  AORTA:  There was dilatation of the ascending aorta at 29 mm in diameter  PERICARDIUM:  A trace pericardial effusion was identified  HISTORY: PRIOR HISTORY: DM2, HTN    PROCEDURE: The study was performed in the [place]  This was a routine study  The transthoracic approach was used  The study included complete 2D imaging, M-mode, complete spectral Doppler, and color Doppler  The heart rate was 120 bpm, at  the start of the study   Images were obtained from the parasternal, apical, subcostal, and suprasternal notch acoustic windows  Image quality was adequate  LEFT VENTRICLE: Size was normal  Systolic function was severely reduced by visual assessment  Ejection fraction was estimated in the range of 25 % to 30 %  There was of the mid-apical anterior, mid anteroseptal, mid inferoseptal,  mid-apical inferior, apical septal, apical lateral, and apical wall(s)  This pattern is very suggestive of a stress induced cardiomyopathy  Wall thickness was normal  DOPPLER: Left ventricular diastolic function parameters were normal     RIGHT VENTRICLE: The size was normal  Systolic function was normal  Wall thickness was normal     LEFT ATRIUM: Size was normal     RIGHT ATRIUM: Size was normal     MITRAL VALVE: Valve structure was normal  There was normal leaflet separation  DOPPLER: The transmitral velocity was within the normal range  There was no evidence for stenosis  There was moderate regurgitation  AORTIC VALVE: The valve was trileaflet  Leaflets exhibited normal thickness and normal cuspal separation  DOPPLER: Transaortic velocity was within the normal range  There was no evidence for stenosis  There was trace regurgitation  TRICUSPID VALVE: The valve structure was normal  There was normal leaflet separation  DOPPLER: The transtricuspid velocity was within the normal range  There was no evidence for stenosis  There was mild regurgitation  PULMONIC VALVE: Leaflets exhibited normal thickness, no calcification, and normal cuspal separation  DOPPLER: The transpulmonic velocity was within the normal range  There was no significant regurgitation  PERICARDIUM: A trace pericardial effusion was identified  The pericardium was normal in appearance  AORTA: The root exhibited normal size  There was dilatation of the ascending aorta at 29 mm in diameter  SYSTEMIC VEINS: IVC: The inferior vena cava was normal in size   Respirophasic changes were normal     MEASUREMENT TABLES    OTHER ECHO MEASUREMENTS  (Reference normals)  Estimated CVP   15 mmHg   (--)    SYSTEM MEASUREMENT TABLES    2D  %FS: 10 39 %  Ao Diam: 2 96 cm  EDV(Teich): 52 49 ml  EF(Teich): 23 28 %  ESV(Teich): 40 27 ml  HR_2Ch_Q: 114 65 BPM  HR_4Ch_Q: 116 12 BPM  IVSd: 0 49 cm  LA Area: 11 59 cm2  LA Diam: 2 51 cm  LVCO_2Ch_Q: 1 98 L/min  LVCO_4Ch_Q: 1 62 L/min  LVCO_BiP_Q: 1 8 L/min  LVEF_2Ch_Q: 32 77 %  LVEF_4Ch_Q: 29 39 %  LVEF_BiP_Q: 31 31 %  LVIDd: 3 55 cm  LVIDs: 3 18 cm  LVLd_2Ch_Q: 6 71 cm  LVLd_4Ch_Q: 6 08 cm  LVLs_2Ch_Q: 6 23 cm  LVLs_4Ch_Q: 5 71 cm  LVPWd: 0 62 cm  LVSV_2Ch_Q: 17 25 ml  LVSV_4Ch_Q: 13 98 ml  LVSV_BiP_Q: 15 76 ml  LVVED_2Ch_Q: 52 64 ml  LVVED_4Ch_Q: 47 57 ml  LVVED_BiP_Q: 50 33 ml  LVVES_2Ch_Q: 35 39 ml  LVVES_4Ch_Q: 33 59 ml  LVVES_BiP_Q: 34 57 ml  RA Area: 8 2 cm2  RVIDd: 2 52 cm  RWT: 0 35  SV(Teich): 12 22 ml    CW  TR Vmax: 2 37 m/s  TR maxP 41 mmHg    MM  TAPSE: 1 49 cm    [facility]    Prepared and electronically signed by    Azra Roberts MD  Signed 2021 14:33:59         NANI Higgins        "This note has been constructed using a voice recognition system  Therefore there may be syntax, spelling, and/or grammatical errors   Please call if you have any questions  "

## 2021-02-08 NOTE — PLAN OF CARE
Problem: Potential for Falls  Goal: Patient will remain free of falls  Description: INTERVENTIONS:  - Assess patient frequently for physical needs  -  Identify cognitive and physical deficits and behaviors that affect risk of falls    -  [place] fall precautions as indicated by assessment   - Educate patient/family on patient safety including physical limitations  - Instruct patient to call for assistance with activity based on assessment  - Modify environment to reduce risk of injury  - Consider OT/PT consult to assist with strengthening/mobility  Outcome: Progressing     Problem: Prexisting or High Potential for Compromised Skin Integrity  Goal: Skin integrity is maintained or improved  Description: INTERVENTIONS:  - Identify patients at risk for skin breakdown  - Assess and monitor skin integrity  - Assess and monitor nutrition and hydration status  - Monitor labs   - Assess for incontinence   - Turn and reposition patient  - Assist with mobility/ambulation  - Relieve pressure over bony prominences  - Avoid friction and shearing  - Provide appropriate hygiene as needed including keeping skin clean and dry  - Evaluate need for skin moisturizer/barrier cream  - Collaborate with interdisciplinary team   - Patient/family teaching  - Consider wound care consult   Outcome: Progressing     Problem: PAIN - ADULT  Goal: Verbalizes/displays adequate comfort level or baseline comfort level  Description: Interventions:  - Encourage patient to monitor pain and request assistance  - Assess pain using appropriate pain scale  - Administer analgesics based on type and severity of pain and evaluate response  - Implement non-pharmacological measures as appropriate and evaluate response  - Consider cultural and social influences on pain and pain management  - Notify physician/advanced practitioner if interventions unsuccessful or patient reports new pain  Outcome: Progressing     Problem: INFECTION - ADULT  Goal: Absence or prevention of progression during hospitalization  Description: INTERVENTIONS:  - Assess and monitor for signs and symptoms of infection  - Monitor lab/diagnostic results  - Monitor all insertion sites, i e  indwelling lines, tubes, and drains  - Monitor endotracheal if appropriate and nasal secretions for changes in amount and color  - [place] appropriate cooling/warming therapies per order  - Administer medications as ordered  - Instruct and encourage patient and family to use good hand hygiene technique  - Identify and instruct in appropriate isolation precautions for identified infection/condition  Outcome: Progressing     Problem: SAFETY ADULT  Goal: Patient will remain free of falls  Description: INTERVENTIONS:  - Assess patient frequently for physical needs  -  Identify cognitive and physical deficits and behaviors that affect risk of falls    -  [place] fall precautions as indicated by assessment   - Educate patient/family on patient safety including physical limitations  - Instruct patient to call for assistance with activity based on assessment  - Modify environment to reduce risk of injury  - Consider OT/PT consult to assist with strengthening/mobility  Outcome: Progressing  Goal: Maintain or return to baseline ADL function  Description: INTERVENTIONS:  -  Assess patient's ability to carry out ADLs; assess patient's baseline for ADL function and identify physical deficits which impact ability to perform ADLs (bathing, care of mouth/teeth, toileting, grooming, dressing, etc )  - Assess/evaluate cause of self-care deficits   - Assess range of motion  - Assess patient's mobility; develop plan if impaired  - Assess patient's need for assistive devices and provide as appropriate  - Encourage maximum independence but intervene and supervise when necessary  - Involve family in performance of ADLs  - Assess for home care needs following discharge   - Consider OT consult to assist with ADL evaluation and planning for discharge  - Provide patient education as appropriate  Outcome: Progressing  Goal: Maintain or return mobility status to optimal level  Description: INTERVENTIONS:  - Assess patient's baseline mobility status (ambulation, transfers, stairs, etc )    - Identify cognitive and physical deficits and behaviors that affect mobility  - Identify mobility aids required to assist with transfers and/or ambulation (gait belt, sit-to-stand, lift, walker, cane, etc )  - [place] fall precautions as indicated by assessment  - Record patient progress and toleration of activity level on Mobility SBAR; progress patient to next Phase/Stage  - Instruct patient to call for assistance with activity based on assessment  - Consider rehabilitation consult to assist with strengthening/weightbearing, etc   Outcome: Progressing     Problem: DISCHARGE PLANNING  Goal: Discharge to home or other facility with appropriate resources  Description: INTERVENTIONS:  - Identify barriers to discharge w/patient and caregiver  - Arrange for needed discharge resources and transportation as appropriate  - Identify discharge learning needs (meds, wound care, etc )  - Arrange for interpretive services to assist at discharge as needed  - Refer to Case Management Department for coordinating discharge planning if the patient needs post-hospital services based on physician/advanced practitioner order or complex needs related to functional status, cognitive ability, or social support system  Outcome: Progressing     Problem: Knowledge Deficit  Goal: Patient/family/caregiver demonstrates understanding of disease process, treatment plan, medications, and discharge instructions  Description: Complete learning assessment and assess knowledge base    Interventions:  - Provide teaching at level of understanding  - Provide teaching via preferred learning methods  Outcome: Progressing     Problem: CARDIOVASCULAR - ADULT  Goal: Maintains optimal cardiac output and hemodynamic stability  Description: INTERVENTIONS:  - Monitor I/O, vital signs and rhythm  - Monitor for S/S and trends of decreased cardiac output  - Administer and titrate ordered vasoactive medications to optimize hemodynamic stability  - Assess quality of pulses, skin color and temperature  - Assess for signs of decreased coronary artery perfusion  - Instruct patient to report change in severity of symptoms  Outcome: Progressing  Goal: Absence of cardiac dysrhythmias or at baseline rhythm  Description: INTERVENTIONS:  - Continuous cardiac monitoring, vital signs, obtain 12 lead EKG if ordered  - Administer antiarrhythmic and heart rate control medications as ordered  - Monitor electrolytes and administer replacement therapy as ordered  Outcome: Progressing     Problem: Nutrition/Hydration-ADULT  Goal: Nutrient/Hydration intake appropriate for improving, restoring or maintaining nutritional needs  Description: Monitor and assess patient's nutrition/hydration status for malnutrition  Collaborate with interdisciplinary team and initiate plan and interventions as ordered  Monitor patient's weight and dietary intake as ordered or per policy  Utilize nutrition screening tool and intervene as necessary  Determine patient's food preferences and provide high-protein, high-caloric foods as appropriate       INTERVENTIONS:  - Monitor oral intake, urinary output, labs, and treatment plans  - Assess nutrition and hydration status and recommend course of action  - Evaluate amount of meals eaten  - Assist patient with eating if necessary   - Allow adequate time for meals  - Recommend/ encourage appropriate diets, oral nutritional supplements, and vitamin/mineral supplements  - Order, calculate, and assess calorie counts as needed  - Recommend, monitor, and adjust tube feedings and TPN/PPN based on assessed needs  - Assess need for intravenous fluids  - Provide specific nutrition/hydration education as appropriate  - Include patient/family/caregiver in decisions related to nutrition  Outcome: Progressing

## 2021-02-08 NOTE — PHYSICAL THERAPY NOTE
PT TREATMENT     02/08/21 2010   Note Type   Note Type Treatment   Pain Assessment   Pain Assessment Tool Pain Assessment not indicated - pt denies pain   Restrictions/Precautions   Other Precautions Chair Alarm; Bed Alarm; Fall Risk   General   Chart Reviewed Yes   Family/Caregiver Present No   Cognition   Arousal/Participation Cooperative   Subjective   Subjective patient cooperative and confused at times, reporting fatigue this PM and tolerated BLE exercise and strengthening well and will benefit from continued PT with progression as tolerated    Bed Mobility   Supine to Sit Unable to assess  (pt deferred, agreeable to exericse)   Exercises   Hamstring Stretch Supine;5 reps;PROM; Bilateral   Quad Sets Supine;10 reps;Bilateral   Heelslides Supine;10 reps;Bilateral   Glute Sets Supine;10 reps   Hip Abduction Supine;10 reps;Bilateral   Ankle Pumps Supine;10 reps;Bilateral   Heel Cord Stretch Supine;5 reps;PROM; Bilateral   Marching Supine;10 reps;Bilateral  (hip and knees flexed, feet on bed)   Bridging Supine;5 reps;AAROM   Assessment   Assessment patient tolerated bedside exercise well and will benefit from continued PT with progression as tolerated  The patient's [facility] Basic Mobility Inpatient Short Form Raw Score is 11, Standardized Score is 30 25  A standardized score less than 42 9 suggests the patient may benefit from discharge to post-acute rehabilitation services  Plan   Treatment/Interventions ADL retraining;Functional transfer training;LE strengthening/ROM; Therapeutic exercise; Endurance training;Patient/family training;Equipment eval/education; Bed mobility;Gait training; Compensatory technique education   PT Frequency   (3-5week)   Recommendation   PT Discharge Recommendation Post-Acute Rehabilitation Services   [facility] Basic Mobility Inpatient   Turning in Bed Without Bedrails 2   Lying on Back to Sitting on Edge of Flat Bed 2   Moving Bed to Chair 2   Standing Up From Chair 2   Walk in Room 2   Climb 3-5 Stairs 1   Basic Mobility Inpatient Raw Score 11   Basic Mobility Standardized Score 84 08   Licensure   [place] License Number  Bal Giulia PT [id number]

## 2021-02-09 PROBLEM — U07.1 COVID-19 VIRUS INFECTION: Status: ACTIVE | Noted: 2021-01-01

## 2021-02-09 NOTE — ASSESSMENT & PLAN NOTE
Generalized weakness - multifactorial, hyponatremia, steroid, thyroid medication adjustments    Currently generalized weakness is most likely 2/2 failure to thrive, severe protein calorie malnutrition, COVID infection  - PT OT

## 2021-02-09 NOTE — ASSESSMENT & PLAN NOTE
Patient was loaded with digoxin and started on digoxin 125 micrograms p o  Daily which has been discontinued  Patient on Toprol-XL 25 milligram p o  Daily at bedtime along with midodrine 5 milligram p o  T i d    heart rate has improved overall compared to before  Per Cardiology, patient with history of sinus tachycardia over the last 2-3 years  Patient's orthostatic vital signs were negative

## 2021-02-09 NOTE — PROGRESS NOTES
Progress Note Aliya Perez 1955, 72 y o  female MRN: [id number]    Unit/Bed#: [address] [place] [address] Encounter: [id number]    Primary Care Provider: Corine Mendoza DO   Date and time admitted to hospital: 2/4/2021  1:25 AM        * Elevated troponin  Assessment & Plan  Patient presented to the ED at [facility] with an elevated troponin peaked at 3 6, currently trending down  Most recent was 0 05  Likely non MI troponin elevation in the setting of stress cardiomyopathy- patient underwent echo which showed an EF of 30%  Cardiology consult appreciated  Patient underwent nuclear stress test  which was markedly abnormal with mild diffuse decrease in uptake in apical and mid walls involving all the walls  LVEF was severely decreased with apical ballooning  Continue medical management as per Cardiology        Hyponatremia  Assessment & Plan  Multifactorial 2/2 poor oral intake, adrenal insufficiency, SIADH  - sodium was 120 on admission and has since improved  - IVFs currently on hold  Sodium level was slightly low but normalized over the past 2 days  Results from last 7 days   Lab Units 02/07/21  0528 02/06/21  0501 02/04/21  0619 02/03/21  0235 02/02/21  1741 02/02/21  0509 02/02/21  0123 02/01/21  2200   SODIUM mmol/L 138 139 133* 132* 130* 121* 118* 116*       Sinus tachycardia  Assessment & Plan  Heart rate continues to remain uncontrolled  Patient not able to tolerate beta-blocker due to low blood pressure  Patient was loaded with digoxin and continued on digoxin 125 micrograms p o  Daily  Patient needs digoxin level checked in 1 week  Patient started on Toprol-XL 25 milligram p o  Daily at bedtime along with midodrine 5 milligram p o  T i d  Patient's orthostatic vital signs were negative  Generalized weakness  Assessment & Plan  Generalized weakness is multifactorial, hyponatremia has since improved since admission to [facility], and steroid, thyroid medication adjustments    Currently generalized weakness is most likely 2/2 failure to thrive, severe protein calorie malnutrition  - recommendations as below see plan for hyponatremia and hypopituitarism  - PT OT  Patient to be discharged to short-term rehabilitation    Hypotension  Assessment & Plan  Patient's home medications have been on hold due to borderline low blood pressure  Patient was to added on low-dose beta-blocker due to sinus tachycardia which could not tolerate due to low blood pressure  Patient added on midodrine 5 milligram p o  T i d  Thyroid disorder  Assessment & Plan  Levothyroxine dose increased to 50 mcg daily    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HCC)  Assessment & Plan  Continue IV hydrocortisone 10 milligram IV daily,  -appreciate further Endocrinology recommendations  - levothyroxine dose was increased to 50 mcg daily  Patient can resume home dose hydrocortisone upon discharge    Severe protein-calorie malnutrition (HCC)  Assessment & Plan  Malnutrition Findings:   Adult Malnutrition type: Chronic illness(Severe protein calorie malnutrition due to inadequate energy intake as evidenced by hollow orbits, depressed temples and protruding clavicles  Treated with Regular diet and supplements) cachexia, no subcutaneous fat, BMI 14  Adult Degree of Malnutrition: Other severe protein calorie malnutrition  BMI Findings:  Adult BMI Classifications: Underweight < 18 5  Body mass index is 15 26 kg/m²  Nutrition consult appreciated   Continue diet with nutritional supplementation    Constipation  Assessment & Plan  Patient did not have a bowel movement in 1 week  Continue MiraLax 17 grams p o  Daily  Patient had a large brown bowel movement with enema on 02/05    FTT (failure to thrive) in adult  Assessment & Plan  Patient has a history of anorexia nervosa  Continue thiamine, phosphate supplements  PT/OT  Patient will be discharged to short-term rehabilitation          VTE Pharmacologic Prophylaxis:   Pharmacologic: Enoxaparin (Lovenox)  Mechanical VTE Prophylaxis in Place: Yes    Patient Centered Rounds: I have performed bedside rounds with nursing staff today  Discussions with Specialists or Other Care Team Provider: Dr Marcia Burkitt    Education and Discussions with Family / Patient: yes    Time Spent for Care: 45 minutes  More than 50% of total time spent on counseling and coordination of care as described above  Current Length of Stay: 4 day(s)    Current Patient Status: Inpatient   Certification Statement: The patient will continue to require additional inpatient hospital stay due to Tachycardia    Discharge Plan:  Short-term rehabilitation    Code Status: Level 3 - DNAR and DNI      Subjective:   Patient complaining of diffuse pain  Patient also reports some dizziness    Objective:     Vitals:   Temp (24hrs), Av 1 °F (36 7 °C), Min:97 5 °F (36 4 °C), Max:98 7 °F (37 1 °C)    Temp:  [97 5 °F (36 4 °C)-98 7 °F (37 1 °C)] 97 5 °F (36 4 °C)  HR:  [] 84  Resp:  [18-19] 19  BP: ()/(60-77) 106/67  SpO2:  [94 %-97 %] 97 %  Body mass index is 15 26 kg/m²  Input and Output Summary (last 24 hours): Intake/Output Summary (Last 24 hours) at 2021  Last data filed at 2021 1800  Gross per 24 hour   Intake 850 ml   Output 1200 ml   Net -350 ml       Physical Exam:     Physical Exam  Constitutional:       General: She is not in acute distress  Comments: Frail and emaciated   HENT:      Head: Normocephalic and atraumatic  Nose: Nose normal    Eyes:      Conjunctiva/sclera: Conjunctivae normal       Pupils: Pupils are equal, round, and reactive to light  Neck:      Musculoskeletal: Normal range of motion and neck supple  Cardiovascular:      Rate and Rhythm: Regular rhythm  Tachycardia present  Heart sounds: Normal heart sounds  Pulmonary:      Effort: Pulmonary effort is normal  No respiratory distress  Breath sounds: Normal breath sounds  No wheezing or rales     Abdominal: General: Bowel sounds are normal  There is no distension  Palpations: Abdomen is soft  Tenderness: There is no abdominal tenderness  There is no guarding or rebound  Skin:     General: Skin is warm and dry  Findings: No rash  Neurological:      Mental Status: She is alert  Cranial Nerves: No cranial nerve deficit  Additional Data:     Labs:    Results from last 7 days   Lab Units 02/04/21  0619   WBC Thousand/uL 9 61   HEMOGLOBIN g/dL 11 5   HEMATOCRIT % 34 9   PLATELETS Thousands/uL 247   NEUTROS PCT % 89*   LYMPHS PCT % 6*   MONOS PCT % 4   EOS PCT % 0     Results from last 7 days   Lab Units 02/07/21  0528  02/04/21  0619   POTASSIUM mmol/L 4 2   < > 4 2   CHLORIDE mmol/L 102   < > 99*   CO2 mmol/L 31   < > 26   BUN mg/dL 7   < > 8   CREATININE mg/dL 0 53*   < > 0 65   CALCIUM mg/dL 8 2*   < > 8 4   ALK PHOS U/L  --   --  42*   ALT U/L  --   --  25   AST U/L  --   --  32    < > = values in this interval not displayed  * I Have Reviewed All Lab Data Listed Above  * Additional Pertinent Lab Tests Reviewed:  All [facility] Admission Reviewed        Recent Cultures (last 7 days):           Last 24 Hours Medication List:   Current Facility-Administered Medications   Medication Dose Route Frequency Provider Last Rate    acetaminophen  650 mg Oral Q6H PRN Yasmin Sepulveda PA-C      ARIPiprazole  15 mg Oral BID Yasmin Sepulveda PA-C      aspirin  81 mg Oral Daily Yasmin Sepulveda PA-C      clonazePAM  0 5 mg Oral BID Yasmin Sepulveda PA-C      digoxin  125 mcg Oral Daily Toan Jordan DO      enoxaparin  30 mg Subcutaneous Q24H [address] Yasmin Sepulveda PA-C      ferrous sulfate  325 mg Oral Daily With Breakfast Yasmin Sepulveda PA-C      hydrocortisone sodium succinate  10 mg Intravenous Daily Yasmin Sepulveda PA-C      hydrocortisone sodium succinate  15 mg Intravenous Early Morning Yasmin Sepulveda PA-C      levothyroxine  50 mcg Oral Early Morning Yasmin Sepulveda PA-C  metoprolol succinate  25 mg Oral HS NANI Churchill      midodrine  5 mg Oral TID AC NANI Churchill      multivitamin-minerals  1 tablet Oral Daily Yoni Coronel      ondansetron  4 mg Intravenous Q6H PRN Sayr Solis PA-C      polyethylene glycol  17 g Oral Daily Sary Solis PA-C      potassium-sodium phosphateS  1 tablet Oral TID With Meals Sary Solis PA-C      senna-docusate sodium  1 tablet Oral HS NANI Churchill      thiamine  100 mg Oral Daily Sary Solis PA-C      traZODone  50 mg Oral HS Sary Solis PA-C          Today, Patient Was Seen By: Erich Matson MD    ** Please Note: Dictation voice to text software may have been used in the creation of this document   **

## 2021-02-09 NOTE — QUICK NOTE
Quick note  02/09/2021 1400    Patient transferred to [address] [place] [address] due to testing Covid 19 positive  Remains sinus rhythm sinus tachycardia rates 90s to low 100s  Patient initiated on midodrine 5 mg t i d  Last evening  With slight improvement in blood pressure  Will continue this dose for another 24 hours and then re-evaluate  Patient also started on low-dose Toprol XL 25 mg at bedtime    Will continue to monitor    [facility]  Cardiology

## 2021-02-09 NOTE — ASSESSMENT & PLAN NOTE
Patient's home medications were on hold due to borderline low blood pressure  Patient was started on low-dose beta-blocker due to sinus tachycardia  midodrine 5 milligram p o  T i d  was added due to low BP

## 2021-02-09 NOTE — ASSESSMENT & PLAN NOTE
Patient's home medications have been on hold due to borderline low blood pressure  Patient was to added on low-dose beta-blocker due to sinus tachycardia which could not tolerate due to low blood pressure  Patient added on midodrine 5 milligram p o  T i d

## 2021-02-09 NOTE — PROGRESS NOTES
[facility] Internal Medicine Progress Note  Patient: Lisa Cruz 72 y o  female   MRN: [id number]  PCP: Sheyla Fernández DO  Unit/Bed#: [address] [place] [address] Encounter: [id number]  Date Of Visit: 02/09/21    Problem List:    Principal Problem:    Elevated troponin  Active Problems:    Sinus tachycardia    COVID-19 virus infection    Hyponatremia    FTT (failure to thrive) in adult    Constipation    Severe protein-calorie malnutrition (HCC)    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HCC)    Thyroid disorder    Hypotension    Generalized weakness      Assessment & Plan:    * Elevated troponin  Assessment & Plan  Patient presented to the ED at [place] with an elevated troponin peaked at 3 6   Likely non MI troponin elevation in the setting of stress cardiomyopathy- patient underwent echo which showed an EF of 30%  Cardiology consult appreciated  Patient underwent nuclear stress test  which was markedly abnormal with mild diffuse decrease in uptake in apical and mid walls involving all the walls  LVEF was severely decreased with apical ballooning  Continue medical management as per Cardiology  Sinus tachycardia  Assessment & Plan  Patient was loaded with digoxin and continued on digoxin 125 micrograms p o  Daily  Patient needs digoxin level checked in 1 week  Patient on Toprol-XL 25 milligram p o  Daily at bedtime along with midodrine 5 milligram p o  T i d  Patient's orthostatic vital signs were negative  COVID-19 virus infection  Assessment & Plan  Patient tested positive for COVID, remains asymptomatic  Check and trend inflammatory markers    Generalized weakness  Assessment & Plan  Generalized weakness - multifactorial, hyponatremia, steroid, thyroid medication adjustments    Currently generalized weakness is most likely 2/2 failure to thrive, severe protein calorie malnutrition    - PT OT  Patient to be discharged to short-term rehabilitation    Hypotension  Assessment & Plan  Patient's home medications were on hold due to borderline low blood pressure  Patient was to added on low-dose beta-blocker due to sinus tachycardia  midodrine 5 milligram p o  T i d  added due to low BP    Thyroid disorder  Assessment & Plan  Levothyroxine dose increased to 50 mcg daily  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[facility] )  Assessment & Plan  Continue IV hydrocortisone  -appreciate Endocrinology recommendations  Patient can resume home dose hydrocortisone upon discharge    Severe protein-calorie malnutrition ([facility] )  Assessment & Plan  Malnutrition Findings:   Adult Malnutrition type: Chronic illness(Severe protein calorie malnutrition due to inadequate energy intake as evidenced by hollow orbits, depressed temples and protruding clavicles  Treated with Regular diet and supplements)   Adult Degree of Malnutrition: Other severe protein calorie malnutrition  BMI Findings:  Adult BMI Classifications: Underweight < 18 5  Body mass index is 16 01 kg/m²  Nutrition consult appreciated   Continue diet with nutritional supplementation    Constipation  Assessment & Plan  Patient did not have a bowel movement in 1 week  Continue MiraLax 17 grams p o  Daily  Patient had a large brown bowel movement with enema on 02/05    FTT (failure to thrive) in adult  Assessment & Plan  Patient has a history of anorexia nervosa  Continue thiamine, phosphate supplements  PT/OT  Patient will be discharged to short-term rehabilitation      Hyponatremia  Assessment & Plan  Multifactorial 2/2 poor oral intake, adrenal insufficiency, SIADH  - sodium was 120 on admission and has since improved  - IVF on hold    Results from last 7 days   Lab Units 02/09/21  0607 02/07/21  0528 02/06/21  0501 02/04/21  0619 02/03/21  0235   SODIUM mmol/L 139 138 139 133* 132*         VTE Pharmacologic Prophylaxis:   Pharmacologic: Enoxaparin (Lovenox)  Mechanical VTE Prophylaxis in Place: Yes    Patient Centered Rounds: I have performed bedside rounds with nursing staff today     Discussions with Specialists or Other Care Team Provider: yes    Education and Discussions with Family / Patient: yes    Time Spent for Care: 30 minutes  More than 50% of total time spent on counseling and coordination of care as described above  Current Length of Stay: 5 day(s)    Current Patient Status: Inpatient   Certification Statement: The patient will continue to require additional inpatient hospital stay due to Sinus tachycardia, COVID infection    Discharge Plan: Pending    Code Status: Level 3 - DNAR and DNI      Subjective:   Patient states that she cannot sit in the chair anymore and wants to get back in bed    Objective:     Vitals:   Temp (24hrs), Av 8 °F (37 1 °C), Min:97 5 °F (36 4 °C), Max:99 6 °F (37 6 °C)    Temp:  [97 5 °F (36 4 °C)-99 6 °F (37 6 °C)] 99 °F (37 2 °C)  HR:  [] 103  Resp:  [17-20] 17  BP: ()/(50-69) 104/61  SpO2:  [94 %-97 %] 94 %  Body mass index is 16 01 kg/m²  Input and Output Summary (last 24 hours): Intake/Output Summary (Last 24 hours) at 2021 1102  Last data filed at 2021  Gross per 24 hour   Intake 460 ml   Output 350 ml   Net 110 ml       Physical Exam:     Physical Exam  Constitutional:       General: She is not in acute distress  Appearance: She is not diaphoretic  Comments: Thin female   HENT:      Head: Normocephalic and atraumatic  Eyes:      General:         Right eye: No discharge  Left eye: No discharge  Cardiovascular:      Rate and Rhythm: Regular rhythm  Tachycardia present  Pulmonary:      Effort: Pulmonary effort is normal  No respiratory distress  Breath sounds: No wheezing or rales  Comments: Decreased breath sounds bilaterally  Abdominal:      General: Bowel sounds are normal  There is no distension  Palpations: Abdomen is soft  Tenderness: There is no abdominal tenderness  Neurological:      Mental Status: She is alert and oriented to person, place, and time  Additional Data:     Labs:    Results from last 7 days   Lab Units 02/09/21  0607   WBC Thousand/uL 6 35   HEMOGLOBIN g/dL 9 1*   HEMATOCRIT % 28 3*   PLATELETS Thousands/uL 265   NEUTROS PCT % 87*   LYMPHS PCT % 8*   MONOS PCT % 4   EOS PCT % 0     Results from last 7 days   Lab Units 02/09/21  0607  02/04/21  0619   POTASSIUM mmol/L 3 5   < > 4 2   CHLORIDE mmol/L 102   < > 99*   CO2 mmol/L 32   < > 26   BUN mg/dL 12   < > 8   CREATININE mg/dL 0 61   < > 0 65   CALCIUM mg/dL 8 0*   < > 8 4   ALK PHOS U/L  --   --  42*   ALT U/L  --   --  25   AST U/L  --   --  32    < > = values in this interval not displayed  * I Have Reviewed All Lab Data Listed Above  * Additional Pertinent Lab Tests Reviewed:  [facility] Admission Reviewed      Imaging:  Imaging Reports Reviewed Today Include: CXR  Imaging Personally Reviewed by Myself Includes:  N/A    Recent Cultures (last 7 days):           Last 24 Hours Medication List:   Current Facility-Administered Medications   Medication Dose Route Frequency Provider Last Rate    acetaminophen  650 mg Oral Q6H PRN Rico Washington PA-C      ARIPiprazole  15 mg Oral BID Rico Washington PA-C      aspirin  81 mg Oral Daily Rico Washington PA-C      clonazePAM  0 5 mg Oral BID Rico Washington PA-C      digoxin  125 mcg Oral Daily Toan Jordan DO      enoxaparin  30 mg Subcutaneous Q24H [address] Rico Washington PA-C      ferrous sulfate  325 mg Oral Daily With Breakfast Rico Washington PA-C      hydrocortisone sodium succinate  10 mg Intravenous Daily Rico Washington PA-C      hydrocortisone sodium succinate  15 mg Intravenous Early Morning Rico Washington PA-C      levothyroxine  50 mcg Oral Early Morning Rico Washington PA-C      metoprolol succinate  25 mg Oral HS NANI Borjas      midodrine  5 mg Oral TID AC NANI Borjas      multivitamin-minerals  1 tablet Oral Daily Rico Washington PA-C      ondansetron  4 mg Intravenous Q6H PRN Pedro Luis Alva PA-C      polyethylene glycol  17 g Oral Daily Yoni Blanton      potassium-sodium phosphateS  1 tablet Oral TID With Meals Pedro Luis Alva PA-C      senna-docusate sodium  1 tablet Oral HS NANI Langford      thiamine  100 mg Oral Daily Pedro Luis Alva PA-C      traZODone  50 mg Oral HS Pedro Luis Alva PA-C            Today, Patient Was Seen By: Edilberto He DO    ** Please Note: "This note has been constructed using a voice recognition system  Therefore there may be syntax, spelling, and/or grammatical errors   Please call if you have any questions  "**

## 2021-02-09 NOTE — ASSESSMENT & PLAN NOTE
Patient presented to the ED at [facility] with an elevated troponin peaked at 3 6  Likely non MI troponin elevation in the setting of stress cardiomyopathy- patient underwent echo which showed an EF of 30%  Patient underwent nuclear stress test  which was markedly abnormal with mild diffuse decrease in uptake in apical and mid walls involving all the walls  LVEF was severely decreased with apical ballooning    Continue medical management as per Cardiology

## 2021-02-09 NOTE — ASSESSMENT & PLAN NOTE
"EMPLOYEE’S CLAIM FOR COMPENSATION/ REPORT OF INITIAL TREATMENT  FORM C-4    EMPLOYEE’S CLAIM - PROVIDE ALL INFORMATION REQUESTED   First Name  Daron Last Name  Reshma Birthdate                    1968                Sex  male Claim Number   Home Address  [address] Age  50 y.o. 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 (if applicable)  Walking Driveway     How did this injury or occupational disease occur? (Be specific an answer in detail. Use additional sheet if necessary)  Slipped on icy driveway    If you believe that you have an occupational disease, when did you first have knowledge of the disability and it relationship to your employment?  n/a Witnesses to the Accident  n/a      Nature of Injury or Occupational Disease  Workers' Compensation  Part(s) of Body Injured or Affected  Foot (R), N/A, N/A    I certify that the above is true and correct to the best of my knowledge and that I have provided this information in order to obtain the benefits of [place] or " Patient tested positive for COVID on February 8, 2021  ·  Over the past couple of days patient's oxygen requirements have been increasing and patient is currently on high-flow oxygen at 15 liters/minute  · Patient has completed course of remdesivir on February 13, 2021  Patient refusing Decadron     · Ferritin 453-507  · -248  · Procalcitonin level 3 98  · Patient continues to get worse over the past 2 days and coordinate care discussed with patient's son that overall prognosis is guarded        Results from last 7 days   Lab Units 02/16/21  0610 02/15/21  0503 02/14/21  0524 02/13/21  0554 02/12/21  0611 02/11/21  0131 02/09/21  1559 02/09/21  1401   CRP mg/L  --  79 0* 92 9* 154 3* 229 1* 85 4*  --  109 7*   FERRITIN ng/mL  --  423* 525* 687* 503* 355 172  --    D-DIMER QUANTITATIVE ug/ml FEU 1 03*  --  0 98*  --   --  1 20* 1 14*  -- Chapter [place]).  I hereby authorize any physician, chiropractor, surgeon, practitioner, or other person, any hospital, including [facility], any medical service organization, any insurance company, or other institution or organization to release to each other, any medical or other information, including benefits paid or payable, pertinent to this injury or disease, except information relative to diagnosis, treatment and/or counseling for AIDS, psychological conditions, alcohol or controlled substances, for which I must give specific authorization.  A Photostat of this authorization shall be as valid as the original.     Date   Place   Employee’s Signature   THIS REPORT MUST BE COMPLETED AND MAILED WITHIN 3 WORKING DAYS OF TREATMENT   Place  [facility]  Name of Facility  [facility]   Date  1/22/2019 Diagnosis  (M25.571) Acute right ankle pain Is there evidence the injured employee was under the influence of alcohol and/or another controlled substance at the time of accident?   Hour  5:00 PM Description of Injury or Disease  The encounter diagnosis was Acute right ankle pain. No   Treatment   Acute right ankle fracture    X-rays were personally reviewed by myself.         Acute fracture of the posterior malleolus with dislocated tibiotalar joint.  Acute comminuted and angulated fracture of the distal fibula.      Fracture is ABOVE the mortise, and is unstable.          Patient requires a higher level of care and urgent orthopaedic consultation    Will transfer to ER        Called report to Dr. Kennedy at [facility]  Have you advised the patient to remain off work five days or more? No   X-Ray Findings      If Yes   From Date  To Date      From information given by the employee, together with medical evidence, can you directly connect this injury or occupational disease as job incurred?  Yes If No Full Duty  No Modified Duty  Yes   Is additional medical care by a  "physician indicated?  Yes If Modified Duty, Specify any Limitations / Restrictions  Restrictions per D39     Do you know of any previous injury or disease contributing to this condition or occupational disease?                            No   Date  1/22/2019 Print Doctor’s Name Quoc Menendez M.D. I certify the employer’s copy of  this form was mailed on:   Address  [address] Insurer’s Use Only     [facility]  [id number]    Provider’s Tax ID Number  [id number] Telephone  Dept: [phone]        e-QUOC Bledsoe M.D.   e-Signature: Dr. Juan Mehta, Medical Director Degree  MD        ORIGINAL-TREATING PHYSICIAN OR CHIROPRACTOR    PAGE 2-INSURER/TPA    PAGE 3-EMPLOYER    PAGE 4-EMPLOYEE             Form C-4 (rev10/07)              BRIEF DESCRIPTION OF RIGHTS AND BENEFITS  (Pursuant to [place] 616[place])    Notice of Injury or Occupational Disease (Incident Report Form C-1): If an injury or occupational disease (OD) arises out of and in the  course of employment, you must provide written notice to your employer as soon as practicable, but no later than 7 days after the accident or  OD. Your employer shall maintain a sufficient supply of the required forms.    Claim for Compensation (Form C-4): If medical treatment is sought, the form C-4 is available at the place of initial treatment. A completed  \"Claim for Compensation\" (Form C-4) must be filed within 90 days after an accident or OD. The treating physician or chiropractor must,  within 3 working days after treatment, complete and mail to the employer, the employer's insurer and third-party , the Claim for  Compensation.    Medical Treatment: If you require medical treatment for your on-the-job injury or OD, you may be required to select a physician or  chiropractor from a list provided by your workers’ compensation insurer, if it has contracted with an Organization for Managed Care (MCO) or  Preferred Provider " Organization (PPO) or providers of health care. If your employer has not entered into a contract with an MCO or PPO, you  may select a physician or chiropractor from the Panel of Physicians and Chiropractors. Any medical costs related to your industrial injury or  OD will be paid by your insurer.    Temporary Total Disability (TTD): If your doctor has certified that you are unable to work for a period of at least 5 consecutive days, or 5  cumulative days in a 20-day period, or places restrictions on you that your employer does not accommodate, you may be entitled to TTD  compensation.    Temporary Partial Disability (TPD): If the wage you receive upon reemployment is less than the compensation for TTD to which you are  entitled, the insurer may be required to pay you TPD compensation to make up the difference. TPD can only be paid for a maximum of 24  months.    Permanent Partial Disability (PPD): When your medical condition is stable and there is an indication of a PPD as a result of your injury or  OD, within 30 days, your insurer must arrange for an evaluation by a rating physician or chiropractor to determine the degree of your PPD. The  amount of your PPD award depends on the date of injury, the results of the PPD evaluation and your age and wage.    Permanent Total Disability (PTD): If you are medically certified by a treating physician or chiropractor as permanently and totally disabled  and have been granted a PTD status by your insurer, you are entitled to receive monthly benefits not to exceed 66 2/3% of your average  monthly wage. The amount of your PTD payments is subject to reduction if you previously received a PPD award.    Vocational Rehabilitation Services: You may be eligible for vocational rehabilitation services if you are unable to return to the job due to a  permanent physical impairment or permanent restrictions as a result of your injury or occupational disease.    Transportation and Per Snow  Reimbursement: You may be eligible for travel expenses and per tika associated with medical treatment.    Reopening: You may be able to reopen your claim if your condition worsens after claim closure.    Appeal Process: If you disagree with a written determination issued by the insurer or the insurer does not respond to your request, you may  appeal to the Department of Administration, , by following the instructions contained in your determination letter. You must  appeal the determination within 70 days from the date of the determination letter at [address], [address], [place], [place]  [address], or [address], [address], [place], [place] [address]. If you disagree with the  decision, you may appeal to the  Department of Administration, . You must file your appeal within 30 days from the date of the  decision  letter at [address], [address], [place], [place] [address], or [address], [facility], [place], [place] [address]. If you  disagree with a decision of an , you may file a petition for judicial review with the District Court. You must do so within 30  days of the Appeal Officer’s decision. You may be represented by an  at your own expense or you may contact the [facility] for possible  representation.    [place]  for Injured Workers (NAIW): If you disagree with a  decision, you may request that NAIW represent you  without charge at an  Hearing. For information regarding denial of benefits, you may contact the [facility] at: [address], [address], [place] [address], [phone], or [address], [facility] [address] [address], [phone]    To File a Complaint with the Division: If you wish to file a complaint with the  of the Division of Industrial Relations (DIR),  please contact the Workers’ Compensation Section, [address], [address], [place], [place] [address], telephone [phone]) [phone], or  [address], [address], [place], [place] [address], telephone [phone].    For assistance with Workers’ Compensation Issues: you may contact the Office of the [facility] Consumer Health Assistance, [address] [place] [address], [address], [place], [place] [address], Toll Free 1-218.749.6318, Web site: http://govcha.[facility].nv., E-mail  [email or web address]                                                                                                                                                                                                                                   __________________________________________________________________                                                                   _________________                Employee Name / Signature                                                                                                                                                       Date                                                                                                                                                                                                     D-2 (rev. 10/07)

## 2021-02-09 NOTE — ASSESSMENT & PLAN NOTE
Multifactorial 2/2 poor oral intake, adrenal insufficiency, SIADH  - sodium was 120 on admission and has since improved  - IVF discontinued    Results from last 7 days   Lab Units 02/16/21  0610 02/15/21  0503 02/14/21  0524 02/13/21  0554 02/12/21  0611 02/11/21  0131   SODIUM mmol/L 138 140 143 138 138 136

## 2021-02-09 NOTE — ASSESSMENT & PLAN NOTE
Patient presented to the ED at [facility] with an elevated troponin peaked at 3 6, currently trending down  Most recent was 0 05  Likely non MI troponin elevation in the setting of stress cardiomyopathy- patient underwent echo which showed an EF of 30%  Cardiology consult appreciated  Patient underwent nuclear stress test  which was markedly abnormal with mild diffuse decrease in uptake in apical and mid walls involving all the walls    LVEF was severely decreased with apical ballooning  Continue medical management as per Cardiology

## 2021-02-09 NOTE — ASSESSMENT & PLAN NOTE
Generalized weakness is multifactorial, hyponatremia has since improved since admission to [facility], and steroid, thyroid medication adjustments  Currently generalized weakness is most likely 2/2 failure to thrive, severe protein calorie malnutrition     - recommendations as below see plan for hyponatremia and hypopituitarism  - PT OT  Patient to be discharged to short-term rehabilitation

## 2021-02-09 NOTE — ASSESSMENT & PLAN NOTE
Multifactorial 2/2 poor oral intake, adrenal insufficiency, SIADH  - sodium was 120 on admission and has since improved  - IVFs currently on hold  Sodium level was slightly low but normalized over the past 2 days  Results from last 7 days   Lab Units 02/07/21  0528 02/06/21  0501 02/04/21  6603 02/03/21  0235 02/02/21  1741 02/02/21  0509 02/02/21  0123 02/01/21  2200   SODIUM mmol/L 138 139 133* 132* 130* 121* 118* 116*

## 2021-02-09 NOTE — PHYSICAL THERAPY NOTE
PT TREATMENT     02/09/21 1100   PT Last Visit   PT Visit Date 02/09/21   Note Type   Note Type Treatment   Pain Assessment   Pain Assessment Tool Pain Assessment not indicated - pt denies pain   Restrictions/Precautions   Other Precautions Airborne/isolation;Contact/isolation; Fall Risk;Bed Alarm; Chair Alarm;Cognitive   General   Chart Reviewed Yes   Subjective   Subjective "cold"   Bed Mobility   Rolling R 2  Maximal assistance   Additional items Assist x 1;Verbal cues   Rolling L 2  Maximal assistance   Additional items Assist x 1;Verbal cues   Supine to Sit 2  Maximal assistance   Additional items Assist x 1;Verbal cues   Sit to Supine 2  Maximal assistance   Additional items Assist x 1;Verbal cues   Additional Comments Pt sat on EOB x 10 mins working on sitting balance and LE exercise  Transfers   Sit to Stand 2  Maximal assistance   Additional items Assist x 1;Verbal cues   Stand to Sit 2  Maximal assistance   Additional items Assist x 1;Verbal cues   Stand pivot 2  Maximal assistance  (bed to chair)   Additional items Assist x 1;Verbal cues  (Pt sat OOB in chair w all needs in reach;CNA aware)   Balance   Static Sitting   (F-/F)   Dynamic Sitting Fair -   Static Standing Poor   Dynamic Standing Poor   Activity Tolerance   Activity Tolerance Patient limited by fatigue;Treatment limited secondary to medical complications (Comment)  (weakness and deconditioning)   Exercises   Hip Flexion 10 reps;Bilateral;Sitting   Knee AROM Long Arc Quad 10 reps;Bilateral;Sitting   Ankle Pumps 10 reps;Bilateral;Sitting   Assessment   Assessment Pt takes increased time between all tasks and mobility  Pt able to progress OOB to chair today  Pt will cont to benefit from skilled PT services to increase pt's strength and mobility  The patient's AM-PAC Basic Mobility Inpatient Short Form Low Function Raw Score 12 , Standardized Score is 18 33   A standardized score less 42 9 suggests the patient may benefit from discharge to post-acute rehab services  Please also refer to the recommendation of the Physical Therapist for safe discharge planning  Plan   Treatment/Interventions ADL retraining;Functional transfer training;LE strengthening/ROM; Therapeutic exercise; Endurance training;Cognitive reorientation;Patient/family training;Equipment eval/education; Bed mobility;Gait training; Compensatory technique education   PT Frequency 5x/wk   Recommendation   PT Discharge Recommendation Post-Acute Rehabilitation Services   AM-PAC Basic Mobility Inpatient   Turning in Bed Without Bedrails 1   Lying on Back to Sitting on Edge of Flat Bed 1   Moving Bed to Chair 1   Standing Up From Chair 1   Walk in Room 1   Climb 3-5 Stairs 1   Basic Mobility Inpatient Raw Score 6   Turning Head Towards Sound 3   Follow Simple Instructions 3   Low Function Basic Mobility Raw Score 12   Low Function Basic Mobility Standardized Score 59 84   Licensure   [place] License Number  [id number] [place] [address] [id number]

## 2021-02-09 NOTE — ASSESSMENT & PLAN NOTE
Patient did not have a bowel movement for 1 week  Continue MiraLax 17 grams p o  Daily    Patient had a large brown bowel movement with enema on 02/05

## 2021-02-09 NOTE — ASSESSMENT & PLAN NOTE
Malnutrition Findings:   Adult Malnutrition type: Chronic illness(Severe protein calorie malnutrition due to inadequate energy intake as evidenced by hollow orbits, depressed temples and protruding clavicles  Treated with Regular diet and supplements) cachexia, no subcutaneous fat, BMI 14  Adult Degree of Malnutrition: Other severe protein calorie malnutrition  BMI Findings:  Adult BMI Classifications: Underweight < 18 5  Body mass index is 15 26 kg/m²       Nutrition consult appreciated   Continue diet with nutritional supplementation

## 2021-02-09 NOTE — ASSESSMENT & PLAN NOTE
Continue IV hydrocortisone - 15 mg in the morning,10 mg at 4:00 p m  on d/c  -appreciate Endocrinology recommendations

## 2021-02-09 NOTE — ASSESSMENT & PLAN NOTE
Patient has a history of anorexia nervosa  Continue thiamine, phosphate supplements  PT/OT  Patient will be discharged to short-term rehabilitation

## 2021-02-09 NOTE — ASSESSMENT & PLAN NOTE
Malnutrition Findings:   Adult Malnutrition type: Chronic illness(Severe protein calorie malnutrition due to inadequate energy intake as evidenced by hollow orbits, depressed temples and protruding clavicles  Treated with Regular diet and supplements)   Adult Degree of Malnutrition: Other severe protein calorie malnutrition  BMI Findings:  Adult BMI Classifications: Underweight < 18 5  Body mass index is 16 26 kg/m²  Nutrition consult appreciated    Continue diet with nutritional supplementation

## 2021-02-09 NOTE — ASSESSMENT & PLAN NOTE
Patient has a history of anorexia nervosa    Continue thiamine, phosphate supplements  Patient was coughing with all the food and patient was seen by speech therapy  Patient was started on dysphagia 3 diet with nectar thick liquids

## 2021-02-09 NOTE — PLAN OF CARE
Problem: Potential for Falls  Goal: Patient will remain free of falls  Description: INTERVENTIONS:  - Assess patient frequently for physical needs  -  Identify cognitive and physical deficits and behaviors that affect risk of falls    -  [place] fall precautions as indicated by assessment   - Educate patient/family on patient safety including physical limitations  - Instruct patient to call for assistance with activity based on assessment  - Modify environment to reduce risk of injury  - Consider OT/PT consult to assist with strengthening/mobility  Outcome: Progressing     Problem: Prexisting or High Potential for Compromised Skin Integrity  Goal: Skin integrity is maintained or improved  Description: INTERVENTIONS:  - Identify patients at risk for skin breakdown  - Assess and monitor skin integrity  - Assess and monitor nutrition and hydration status  - Monitor labs   - Assess for incontinence   - Turn and reposition patient  - Assist with mobility/ambulation  - Relieve pressure over bony prominences  - Avoid friction and shearing  - Provide appropriate hygiene as needed including keeping skin clean and dry  - Evaluate need for skin moisturizer/barrier cream  - Collaborate with interdisciplinary team   - Patient/family teaching  - Consider wound care consult   Outcome: Progressing     Problem: PAIN - ADULT  Goal: Verbalizes/displays adequate comfort level or baseline comfort level  Description: Interventions:  - Encourage patient to monitor pain and request assistance  - Assess pain using appropriate pain scale  - Administer analgesics based on type and severity of pain and evaluate response  - Implement non-pharmacological measures as appropriate and evaluate response  - Consider cultural and social influences on pain and pain management  - Notify physician/advanced practitioner if interventions unsuccessful or patient reports new pain  Outcome: Progressing     Problem: INFECTION - ADULT  Goal: Absence or prevention of progression during hospitalization  Description: INTERVENTIONS:  - Assess and monitor for signs and symptoms of infection  - Monitor lab/diagnostic results  - Monitor all insertion sites, i e  indwelling lines, tubes, and drains  - Monitor endotracheal if appropriate and nasal secretions for changes in amount and color  - [place] appropriate cooling/warming therapies per order  - Administer medications as ordered  - Instruct and encourage patient and family to use good hand hygiene technique  - Identify and instruct in appropriate isolation precautions for identified infection/condition  Outcome: Progressing     Problem: SAFETY ADULT  Goal: Patient will remain free of falls  Description: INTERVENTIONS:  - Assess patient frequently for physical needs  -  Identify cognitive and physical deficits and behaviors that affect risk of falls    -  [place] fall precautions as indicated by assessment   - Educate patient/family on patient safety including physical limitations  - Instruct patient to call for assistance with activity based on assessment  - Modify environment to reduce risk of injury  - Consider OT/PT consult to assist with strengthening/mobility  Outcome: Progressing  Goal: Maintain or return to baseline ADL function  Description: INTERVENTIONS:  -  Assess patient's ability to carry out ADLs; assess patient's baseline for ADL function and identify physical deficits which impact ability to perform ADLs (bathing, care of mouth/teeth, toileting, grooming, dressing, etc )  - Assess/evaluate cause of self-care deficits   - Assess range of motion  - Assess patient's mobility; develop plan if impaired  - Assess patient's need for assistive devices and provide as appropriate  - Encourage maximum independence but intervene and supervise when necessary  - Involve family in performance of ADLs  - Assess for home care needs following discharge   - Consider OT consult to assist with ADL evaluation and planning for discharge  - Provide patient education as appropriate  Outcome: Progressing  Goal: Maintain or return mobility status to optimal level  Description: INTERVENTIONS:  - Assess patient's baseline mobility status (ambulation, transfers, stairs, etc )    - Identify cognitive and physical deficits and behaviors that affect mobility  - Identify mobility aids required to assist with transfers and/or ambulation (gait belt, sit-to-stand, lift, walker, cane, etc )  - [place] fall precautions as indicated by assessment  - Record patient progress and toleration of activity level on Mobility SBAR; progress patient to next Phase/Stage  - Instruct patient to call for assistance with activity based on assessment  - Consider rehabilitation consult to assist with strengthening/weightbearing, etc   Outcome: Progressing     Problem: DISCHARGE PLANNING  Goal: Discharge to home or other facility with appropriate resources  Description: INTERVENTIONS:  - Identify barriers to discharge w/patient and caregiver  - Arrange for needed discharge resources and transportation as appropriate  - Identify discharge learning needs (meds, wound care, etc )  - Arrange for interpretive services to assist at discharge as needed  - Refer to Case Management Department for coordinating discharge planning if the patient needs post-hospital services based on physician/advanced practitioner order or complex needs related to functional status, cognitive ability, or social support system  Outcome: Progressing     Problem: Knowledge Deficit  Goal: Patient/family/caregiver demonstrates understanding of disease process, treatment plan, medications, and discharge instructions  Description: Complete learning assessment and assess knowledge base    Interventions:  - Provide teaching at level of understanding  - Provide teaching via preferred learning methods  Outcome: Progressing     Problem: CARDIOVASCULAR - ADULT  Goal: Maintains optimal cardiac output and hemodynamic stability  Description: INTERVENTIONS:  - Monitor I/O, vital signs and rhythm  - Monitor for S/S and trends of decreased cardiac output  - Administer and titrate ordered vasoactive medications to optimize hemodynamic stability  - Assess quality of pulses, skin color and temperature  - Assess for signs of decreased coronary artery perfusion  - Instruct patient to report change in severity of symptoms  Outcome: Progressing  Goal: Absence of cardiac dysrhythmias or at baseline rhythm  Description: INTERVENTIONS:  - Continuous cardiac monitoring, vital signs, obtain 12 lead EKG if ordered  - Administer antiarrhythmic and heart rate control medications as ordered  - Monitor electrolytes and administer replacement therapy as ordered  Outcome: Progressing     Problem: Nutrition/Hydration-ADULT  Goal: Nutrient/Hydration intake appropriate for improving, restoring or maintaining nutritional needs  Description: Monitor and assess patient's nutrition/hydration status for malnutrition  Collaborate with interdisciplinary team and initiate plan and interventions as ordered  Monitor patient's weight and dietary intake as ordered or per policy  Utilize nutrition screening tool and intervene as necessary  Determine patient's food preferences and provide high-protein, high-caloric foods as appropriate       INTERVENTIONS:  - Monitor oral intake, urinary output, labs, and treatment plans  - Assess nutrition and hydration status and recommend course of action  - Evaluate amount of meals eaten  - Assist patient with eating if necessary   - Allow adequate time for meals  - Recommend/ encourage appropriate diets, oral nutritional supplements, and vitamin/mineral supplements  - Order, calculate, and assess calorie counts as needed  - Recommend, monitor, and adjust tube feedings and TPN/PPN based on assessed needs  - Assess need for intravenous fluids  - Provide specific nutrition/hydration education as appropriate  - Include patient/family/caregiver in decisions related to nutrition  Outcome: Progressing

## 2021-02-09 NOTE — ASSESSMENT & PLAN NOTE
Heart rate continues to remain uncontrolled  Patient not able to tolerate beta-blocker due to low blood pressure  Patient was loaded with digoxin and continued on digoxin 125 micrograms p o  Daily  Patient needs digoxin level checked in 1 week  Patient started on Toprol-XL 25 milligram p o  Daily at bedtime along with midodrine 5 milligram p o  T i d  Patient's orthostatic vital signs were negative

## 2021-02-10 PROBLEM — I42.0 DILATED CARDIOMYOPATHY (HCC): Status: ACTIVE | Noted: 2021-01-01

## 2021-02-10 PROBLEM — E87.1 HYPONATREMIA: Status: RESOLVED | Noted: 2020-01-01 | Resolved: 2021-01-01

## 2021-02-10 NOTE — PROGRESS NOTES
[facility] Internal Medicine Progress Note  Patient: Kelli Avila 72 y o  female   MRN: [id number]  PCP: Luma Chase DO  Unit/Bed#: [address] [place] [address] Encounter: [id number]  Date Of Visit: 02/10/21    Problem List:    Principal Problem:    Elevated troponin  Active Problems:    Sinus tachycardia    COVID-19 virus infection    FTT (failure to thrive) in adult    Constipation    Severe protein-calorie malnutrition (HCC)    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[place] [id number] )    Thyroid disorder    Hypotension    Generalized weakness    Dilated cardiomyopathy ([facility] )      Assessment & Plan:    * Elevated troponin  Assessment & Plan  Patient presented to the ED at [place] with an elevated troponin peaked at 3 6   Likely non MI troponin elevation in the setting of stress cardiomyopathy- patient underwent echo which showed an EF of 30%  Cardiology consult appreciated  Patient underwent nuclear stress test  which was markedly abnormal with mild diffuse decrease in uptake in apical and mid walls involving all the walls  LVEF was severely decreased with apical ballooning  Continue medical management as per Cardiology        Sinus tachycardia  Assessment & Plan  Patient was loaded with digoxin and on digoxin 125 micrograms p o  Daily  Patient needs digoxin level checked in 1 week  Patient on Toprol-XL 25 milligram p o  Daily at bedtime along with midodrine 5 milligram p o  T i d  heart rate has improved  Patient's orthostatic vital signs were negative  COVID-19 virus infection  Assessment & Plan  Patient tested positive for COVID, remains asymptomatic   Mild disease  · Check and trend inflammatory markers, CRP, ferritin  · Patient was educated and encouraged self proing  · Increase activity and mobilization as tolerated    Results from last 7 days   Lab Units 02/08/21  1803   SARS-COV-2  Positive*     Results from last 7 days   Lab Units 02/09/21  1559 02/09/21  1401   CRP mg/L  --  109 7*   FERRITIN ng/mL 172  --    D-DIMER QUANTITATIVE ug/ml FEU 1 14*  --            Results from last 7 days   Lab Units 02/04/21  0914 02/04/21  0619   TROPONIN I ng/mL 0 25* 0 28*       Generalized weakness  Assessment & Plan  Generalized weakness - multifactorial, hyponatremia, steroid, thyroid medication adjustments  Currently generalized weakness is most likely 2/2 failure to thrive, severe protein calorie malnutrition    - PT OT  Patient to be discharged to short-term rehabilitation  Hypotension  Assessment & Plan  Patient's home medications were on hold due to borderline low blood pressure  Patient was started on low-dose beta-blocker due to sinus tachycardia  midodrine 5 milligram p o  T i d  added due to low BP    Thyroid disorder  Assessment & Plan  Levothyroxine dose increased to 50 mcg daily    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HCC)  Assessment & Plan  Continue IV hydrocortisone  -appreciate Endocrinology recommendations  Patient can resume home dose hydrocortisone upon discharge  Severe protein-calorie malnutrition ([address] )  Assessment & Plan  Malnutrition Findings:   Adult Malnutrition type: Chronic illness(Severe protein calorie malnutrition due to inadequate energy intake as evidenced by hollow orbits, depressed temples and protruding clavicles  Treated with Regular diet and supplements)   Adult Degree of Malnutrition: Other severe protein calorie malnutrition  BMI Findings:  Adult BMI Classifications: Underweight < 18 5  Body mass index is 16 01 kg/m²  Nutrition consult appreciated   Continue diet with nutritional supplementation    Constipation  Assessment & Plan  Patient did not have a bowel movement for 1 week  Continue MiraLax 17 grams p o  Daily  Patient had a large brown bowel movement with enema on 02/05    FTT (failure to thrive) in adult  Assessment & Plan  Patient has a history of anorexia nervosa  Continue thiamine, phosphate supplements    PT/OT  Patient will be discharged to short-term rehabilitation    Hyponatremia-resolved as of 2/10/2021  Assessment & Plan  Multifactorial 2/2 poor oral intake, adrenal insufficiency, SIADH  - sodium was 120 on admission and has since improved  - IVF discontinued    Results from last 7 days   Lab Units 21  0607 21  0528 21  0501 21  0619   SODIUM mmol/L 139 138 139 133*         VTE Pharmacologic Prophylaxis:   Pharmacologic: Enoxaparin (Lovenox)  Mechanical VTE Prophylaxis in Place: Yes    Patient Centered Rounds: I have performed bedside rounds with nursing staff today  Discussions with Specialists or Other Care Team Provider: yes - cardio    Education and Discussions with Family / Patient: yes - Jeff Monroy    Time Spent for Care: 25 min  More than 50% of total time spent on counseling and coordination of care as described above  Current Length of Stay: 6 day(s)    Current Patient Status: Inpatient   Certification Statement: The patient will continue to require additional inpatient hospital stay due to Sinus tachycardia, COVID infection    Discharge Plan: STR    Code Status: Level 3 - DNAR and DNI      Subjective:     States she feels anxious as she cannot go home today  Denies any shortness of breath, chest pain    Objective:     Vitals:   Temp (24hrs), Av °F (37 2 °C), Min:98 7 °F (37 1 °C), Max:99 3 °F (37 4 °C)    Temp:  [98 7 °F (37 1 °C)-99 3 °F (37 4 °C)] 98 7 °F (37 1 °C)  HR:  [65-94] 65  Resp:  [15-17] 17  BP: (101-110)/(55-63) 101/60  SpO2:  [93 %-96 %] 96 %  Body mass index is 16 01 kg/m²  Input and Output Summary (last 24 hours): Intake/Output Summary (Last 24 hours) at 2/10/2021 1009  Last data filed at 2/10/2021 0200  Gross per 24 hour   Intake --   Output 650 ml   Net -650 ml       Physical Exam:     Physical Exam  Constitutional:       General: She is not in acute distress  Appearance: She is not diaphoretic  Comments: Thin female   HENT:      Head: Normocephalic and atraumatic  Eyes:      General:         Right eye: No discharge  Left eye: No discharge  Cardiovascular:      Rate and Rhythm: Normal rate and regular rhythm  Pulmonary:      Effort: Pulmonary effort is normal  No respiratory distress  Breath sounds: No wheezing or rales  Comments: Decreased breath sounds bilaterally  Abdominal:      General: Bowel sounds are normal  There is no distension  Palpations: Abdomen is soft  Tenderness: There is no abdominal tenderness  Neurological:      Mental Status: She is alert  Comments: Oriented to self and knew what year it is  Thought that she was at [address]:         Mood and Affect: Mood is anxious  Additional Data:     Labs:    Results from last 7 days   Lab Units 02/09/21  0607   WBC Thousand/uL 6 35   HEMOGLOBIN g/dL 9 1*   HEMATOCRIT % 28 3*   PLATELETS Thousands/uL 265   NEUTROS PCT % 87*   LYMPHS PCT % 8*   MONOS PCT % 4   EOS PCT % 0     Results from last 7 days   Lab Units 02/09/21  0607  02/04/21  0619   POTASSIUM mmol/L 3 5   < > 4 2   CHLORIDE mmol/L 102   < > 99*   CO2 mmol/L 32   < > 26   BUN mg/dL 12   < > 8   CREATININE mg/dL 0 61   < > 0 65   CALCIUM mg/dL 8 0*   < > 8 4   ALK PHOS U/L  --   --  42*   ALT U/L  --   --  25   AST U/L  --   --  32    < > = values in this interval not displayed  * I Have Reviewed All Lab Data Listed Above  * Additional Pertinent Lab Tests Reviewed:  Mis 66 Admission Reviewed      Imaging:  Imaging Reports Reviewed Today Include: CXR  Imaging Personally Reviewed by Myself Includes:  N/A    Recent Cultures (last 7 days):           Last 24 Hours Medication List:   Current Facility-Administered Medications   Medication Dose Route Frequency Provider Last Rate    acetaminophen  650 mg Oral Q6H PRN Fadi Augustine PA-C      ARIPiprazole  15 mg Oral BID Fadi Augustine PA-C      ascorbic acid  1,000 mg Oral Q12H [facility] Virginia Dupree DO      aspirin 81 mg Oral Daily Rico Washington PA-C      cholecalciferol  2,000 Units Oral Daily Virginia Dupree DO      clonazePAM  0 5 mg Oral BID Rico Washington PA-C      digoxin  125 mcg Oral Daily Navtej NikkiDO      enoxaparin  30 mg Subcutaneous Q24H [address] Rico Washington PA-C      ferrous sulfate  325 mg Oral Daily With Breakfast Rico Washington PA-C      hydrocortisone sodium succinate  10 mg Intravenous Daily Rico Washington PA-C      hydrocortisone sodium succinate  15 mg Intravenous Early Morning Rico Washington PA-C      levothyroxine  50 mcg Oral Early Morning Rico Washington PA-C      metoprolol succinate  25 mg Oral HS NANI Borjas      midodrine  5 mg Oral TID AC NANI Borjas      zinc sulfate  220 mg Oral Daily Vriginia Dupree DO      Followed by   Ever Delatorre ON 2/17/2021] multivitamin-minerals  1 tablet Oral Daily Virginia Dupree DO      ondansetron  4 mg Intravenous Q6H PRN Rico Washington PA-C      polyethylene glycol  17 g Oral Daily Rico Washington PA-C      potassium-sodium phosphateS  1 tablet Oral TID With Meals Rico Washington PA-C      senna-docusate sodium  1 tablet Oral HS NANI Borjas      thiamine  100 mg Oral Daily Rico Washington PA-C      traZODone  50 mg Oral HS Rico Washington PA-C            Today, Patient Was Seen By: Cami Jara DO    ** Please Note: "This note has been constructed using a voice recognition system  Therefore there may be syntax, spelling, and/or grammatical errors   Please call if you have any questions  "**

## 2021-02-10 NOTE — PHYSICAL THERAPY NOTE
PT TREATMENT     02/10/21 1140   PT Last Visit   PT Visit Date 02/10/21   Note Type   Note Type Treatment   Pain Assessment   Pain Assessment Tool Pain Assessment not indicated - pt denies pain   Restrictions/Precautions   Other Precautions Contact/isolation; Airborne/isolation; Fall Risk;Bed Alarm; Chair Alarm   General   Chart Reviewed Yes   Subjective   Subjective "cold"   Bed Mobility   Supine to Sit   (mod/max A)   Additional items Assist x 1;Verbal cues   Additional Comments Pt sat on EOB x 10 mins working on sitting balance   Transfers   Sit to Stand   (mod/max A)   Additional items Assist x 1;Verbal cues   Stand to Sit   (mod/max A)   Additional items Assist x 1;Verbal cues   Stand pivot   (mod/max A)   Additional items Assist x 1;Verbal cues  (bed to chair)   Ambulation/Elevation   Gait pattern Narrow LILI;Shuffling; Short stride  (flexed hips and knees)   Gait Assistance   (mod/max A)   Additional items Assist x 1;Verbal cues; Tactile cues   Assistive Device None;Standard walker   Distance Pt SPT bed to chair;pt then stood with walker x 1 minute  Balance   Static Sitting   (F-/F)   Static Standing Poor  (significant posterior lean)   Dynamic Standing Poor   Activity Tolerance   Activity Tolerance Patient limited by fatigue;Treatment limited secondary to medical complications (Comment)  (weakness)   Exercises   Hip Flexion 10 reps;Bilateral;Sitting   Knee AROM Long Arc Quad 10 reps;Bilateral;Sitting   Ankle Pumps 10 reps;Bilateral;Sitting   Assessment   Assessment Pt noted to be much more alert/awake today, able to have increased participation in PT session  Pt motivated for OOB, standing and LE ex  The patient's AM-PAC Basic Mobility Inpatient Short Form Low Function Raw Score 15 , Standardized Score is 23 9  A standardized score less 42 9 suggests the patient may benefit from discharge to post-acute rehab services   Please also refer to the recommendation of the Physical Therapist for safe discharge planning  Plan   Treatment/Interventions ADL retraining;Functional transfer training;LE strengthening/ROM; Therapeutic exercise; Endurance training;Patient/family training;Equipment eval/education; Bed mobility;Gait training; Compensatory technique education   PT Frequency 5x/wk   Recommendation   PT Discharge Recommendation Post-Acute Rehabilitation Services   AM-PAC Basic Mobility Inpatient   Turning in Bed Without Bedrails 1   Lying on Back to Sitting on Edge of Flat Bed 2   Moving Bed to Chair 2   Standing Up From Chair 2   Walk in Room 1   Climb 3-5 Stairs 1   Basic Mobility Inpatient Raw Score 9   Turning Head Towards Sound 3   Follow Simple Instructions 3   Low Function Basic Mobility Raw Score 15   Low Function Basic Mobility Standardized Score 48 3   Licensure   [place] License Number  [id number] [place] [address] [id number]

## 2021-02-10 NOTE — PLAN OF CARE
Problem: Potential for Falls  Goal: Patient will remain free of falls  Description: INTERVENTIONS:  - Assess patient frequently for physical needs  -  Identify cognitive and physical deficits and behaviors that affect risk of falls    -  [place] fall precautions as indicated by assessment   - Educate patient/family on patient safety including physical limitations  - Instruct patient to call for assistance with activity based on assessment  - Modify environment to reduce risk of injury  - Consider OT/PT consult to assist with strengthening/mobility  2/9/2021 2043 by Raysa Gu RN  Outcome: Progressing  2/9/2021 1329 by Raysa Gu RN  Outcome: Progressing     Problem: Prexisting or High Potential for Compromised Skin Integrity  Goal: Skin integrity is maintained or improved  Description: INTERVENTIONS:  - Identify patients at risk for skin breakdown  - Assess and monitor skin integrity  - Assess and monitor nutrition and hydration status  - Monitor labs   - Assess for incontinence   - Turn and reposition patient  - Assist with mobility/ambulation  - Relieve pressure over bony prominences  - Avoid friction and shearing  - Provide appropriate hygiene as needed including keeping skin clean and dry  - Evaluate need for skin moisturizer/barrier cream  - Collaborate with interdisciplinary team   - Patient/family teaching  - Consider wound care consult   2/9/2021 2043 by Raysa Gu RN  Outcome: Progressing  2/9/2021 1329 by Raysa Gu RN  Outcome: Progressing     Problem: PAIN - ADULT  Goal: Verbalizes/displays adequate comfort level or baseline comfort level  Description: Interventions:  - Encourage patient to monitor pain and request assistance  - Assess pain using appropriate pain scale  - Administer analgesics based on type and severity of pain and evaluate response  - Implement non-pharmacological measures as appropriate and evaluate response  - Consider cultural and social influences on pain and pain management  - Notify physician/advanced practitioner if interventions unsuccessful or patient reports new pain  2/9/2021 2043 by Moody Benz RN  Outcome: Progressing  2/9/2021 1329 by Moody Benz RN  Outcome: Progressing     Problem: INFECTION - ADULT  Goal: Absence or prevention of progression during hospitalization  Description: INTERVENTIONS:  - Assess and monitor for signs and symptoms of infection  - Monitor lab/diagnostic results  - Monitor all insertion sites, i e  indwelling lines, tubes, and drains  - Monitor endotracheal if appropriate and nasal secretions for changes in amount and color  - Effie appropriate cooling/warming therapies per order  - Administer medications as ordered  - Instruct and encourage patient and family to use good hand hygiene technique  - Identify and instruct in appropriate isolation precautions for identified infection/condition  2/9/2021 2043 by Moody Benz RN  Outcome: Progressing  2/9/2021 1329 by Moody Benz RN  Outcome: Progressing     Problem: SAFETY ADULT  Goal: Patient will remain free of falls  Description: INTERVENTIONS:  - Assess patient frequently for physical needs  -  Identify cognitive and physical deficits and behaviors that affect risk of falls    -  [place] fall precautions as indicated by assessment   - Educate patient/family on patient safety including physical limitations  - Instruct patient to call for assistance with activity based on assessment  - Modify environment to reduce risk of injury  - Consider OT/PT consult to assist with strengthening/mobility  2/9/2021 2043 by Moody Benz RN  Outcome: Progressing  2/9/2021 1329 by Moody Benz RN  Outcome: Progressing  Goal: Maintain or return to baseline ADL function  Description: INTERVENTIONS:  -  Assess patient's ability to carry out ADLs; assess patient's baseline for ADL function and identify physical deficits which impact ability to perform ADLs (bathing, care of mouth/teeth, toileting, grooming, dressing, etc )  - Assess/evaluate cause of self-care deficits   - Assess range of motion  - Assess patient's mobility; develop plan if impaired  - Assess patient's need for assistive devices and provide as appropriate  - Encourage maximum independence but intervene and supervise when necessary  - Involve family in performance of ADLs  - Assess for home care needs following discharge   - Consider OT consult to assist with ADL evaluation and planning for discharge  - Provide patient education as appropriate  2/9/2021 2043 by Rochelle Lanes, RN  Outcome: Progressing  2/9/2021 1329 by Rochelle Lanes, RN  Outcome: Progressing  Goal: Maintain or return mobility status to optimal level  Description: INTERVENTIONS:  - Assess patient's baseline mobility status (ambulation, transfers, stairs, etc )    - Identify cognitive and physical deficits and behaviors that affect mobility  - Identify mobility aids required to assist with transfers and/or ambulation (gait belt, sit-to-stand, lift, walker, cane, etc )  - [place] fall precautions as indicated by assessment  - Record patient progress and toleration of activity level on Mobility SBAR; progress patient to next Phase/Stage  - Instruct patient to call for assistance with activity based on assessment  - Consider rehabilitation consult to assist with strengthening/weightbearing, etc   2/9/2021 2043 by Rochelle Lanes, RN  Outcome: Progressing  2/9/2021 1329 by Rochelle Lanes, RN  Outcome: Progressing     Problem: DISCHARGE PLANNING  Goal: Discharge to home or other facility with appropriate resources  Description: INTERVENTIONS:  - Identify barriers to discharge w/patient and caregiver  - Arrange for needed discharge resources and transportation as appropriate  - Identify discharge learning needs (meds, wound care, etc )  - Arrange for interpretive services to assist at discharge as needed  - Refer to Case Management Department for coordinating discharge planning if the patient needs post-hospital services based on physician/advanced practitioner order or complex needs related to functional status, cognitive ability, or social support system  2/9/2021 2043 by Lore Carrera RN  Outcome: Progressing  2/9/2021 1329 by Lore Carrera RN  Outcome: Progressing     Problem: Knowledge Deficit  Goal: Patient/family/caregiver demonstrates understanding of disease process, treatment plan, medications, and discharge instructions  Description: Complete learning assessment and assess knowledge base    Interventions:  - Provide teaching at level of understanding  - Provide teaching via preferred learning methods  2/9/2021 2043 by Lore Carrera RN  Outcome: Progressing  2/9/2021 1329 by Lore Carrera RN  Outcome: Progressing     Problem: CARDIOVASCULAR - ADULT  Goal: Maintains optimal cardiac output and hemodynamic stability  Description: INTERVENTIONS:  - Monitor I/O, vital signs and rhythm  - Monitor for S/S and trends of decreased cardiac output  - Administer and titrate ordered vasoactive medications to optimize hemodynamic stability  - Assess quality of pulses, skin color and temperature  - Assess for signs of decreased coronary artery perfusion  - Instruct patient to report change in severity of symptoms  2/9/2021 2043 by Lore Carrera RN  Outcome: Progressing  2/9/2021 1329 by Lore Carrera RN  Outcome: Progressing  Goal: Absence of cardiac dysrhythmias or at baseline rhythm  Description: INTERVENTIONS:  - Continuous cardiac monitoring, vital signs, obtain 12 lead EKG if ordered  - Administer antiarrhythmic and heart rate control medications as ordered  - Monitor electrolytes and administer replacement therapy as ordered  2/9/2021 2043 by Lore Carrera RN  Outcome: Progressing  2/9/2021 1329 by Lore Carrera RN  Outcome: Progressing     Problem: Nutrition/Hydration-ADULT  Goal: Nutrient/Hydration intake appropriate for improving, restoring or maintaining nutritional needs  Description: Monitor and assess patient's nutrition/hydration status for malnutrition  Collaborate with interdisciplinary team and initiate plan and interventions as ordered  Monitor patient's weight and dietary intake as ordered or per policy  Utilize nutrition screening tool and intervene as necessary  Determine patient's food preferences and provide high-protein, high-caloric foods as appropriate       INTERVENTIONS:  - Monitor oral intake, urinary output, labs, and treatment plans  - Assess nutrition and hydration status and recommend course of action  - Evaluate amount of meals eaten  - Assist patient with eating if necessary   - Allow adequate time for meals  - Recommend/ encourage appropriate diets, oral nutritional supplements, and vitamin/mineral supplements  - Order, calculate, and assess calorie counts as needed  - Recommend, monitor, and adjust tube feedings and TPN/PPN based on assessed needs  - Assess need for intravenous fluids  - Provide specific nutrition/hydration education as appropriate  - Include patient/family/caregiver in decisions related to nutrition  2/9/2021 2043 by Alfredo Briggs RN  Outcome: Progressing  2/9/2021 1329 by Alfredo Briggs RN  Outcome: Progressing

## 2021-02-10 NOTE — PROGRESS NOTES
Progress Note - Cardiology   75 [facility]     Mliton Guerra 72 y o  female MRN: [id number]  : 1955  Unit/Bed#: [address] [place] [address] Encounter: [id number]    Assessment and Plan:   1  New onset cardiomyopathy:  EF 25-30% concerning for takotsubo cardiomyopathy     -  appears patient has had longstanding tachycardia, concerning for component of tachycardia induced cardiomyopathy    -  midodrine 5 mg t i d  Was ordered for blood pressure support    -  patient appears to be tolerating Toprol XL 25 mg at bedtime    -  will continue beta-blocker and discontinue digoxin    -  continue monitor telemetry    -  patient will need repeat echocardiogram in 2-3 months to re-evaluate EF     2  Abnormal troponins question non MI troponin elevation due to stress, anorexia and tachycardia verses MI type 2 with coronary blockages    -  Lexiscan nuclear stress test identified area of fixed defect coordinating to the area of akinesis on her echocardiogram     -  patient able to tolerate Toprol XL 25 mg at bedtime    -  patient is not a good candidate for aggressive Rx  Condition guarded     3  Tachycardia:  Sinus   Appears to be chronic and question whether reflexive due to her anorexia and decreased cardiac output       -  patient tolerating low-dose beta-blocker given at bedtime in addition to blood pressure support with midodrine  Heart rates now in the 60s to 80s  Will discontinue digoxin     4  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      5  Anorexia and PTSD:  BMI 14 3 concerning for failure to thrive    Subjective / Objective:   Patient seen and examined  Her only complaint is of nonproductive cough  Lungs overall clear but diminished throughout  Still not well  Heart rates improved with addition of midodrine for blood pressure support and Toprol at bedtime    Will discontinue digoxin from exam confirmed continue with medication which patient's anorexia and frequently low potassium  Vitals: Blood pressure 101/60, pulse 65, temperature 98 7 °F (37 1 °C), temperature source Temporal, resp  rate 17, height 4' 10" (1 473 m), weight 34 7 kg (76 lb 9 6 oz), SpO2 96 %  Vitals:    02/08/21 0600 02/09/21 0600   Weight: 33 1 kg (73 lb) 34 7 kg (76 lb 9 6 oz)     Body mass index is 16 01 kg/m²  BP Readings from Last 3 Encounters:   02/10/21 101/60   02/03/21 97/78   09/28/20 91/60     Orthostatic Blood Pressures      Most Recent Value   Blood Pressure  101/60 filed at 02/10/2021 0749   Patient Position - Orthostatic VS  Lying filed at 02/10/2021 0749        I/O       02/08 0701 - 02/09 0700 02/09 0701 - 02/10 0700 02/10 0701 - 02/11 0700    P  O  850      I V  (mL/kg) 10 (0 3)      Total Intake(mL/kg) 860 (24 8)      Urine (mL/kg/hr) 700 (0 8) 650 (0 8)     Total Output 700 650     Net +160 -650                Invasive Devices     Peripheral Intravenous Line            Peripheral IV 02/04/21 5 days          Drain            External Urinary Catheter 1 day                  Intake/Output Summary (Last 24 hours) at 2/10/2021 1053  Last data filed at 2/10/2021 0200  Gross per 24 hour   Intake --   Output 650 ml   Net -650 ml         Physical Exam:   Physical Exam  Vitals signs and nursing note reviewed  Constitutional:       Appearance: Normal appearance  She is well-developed  HENT:      Head: Normocephalic  Right Ear: External ear normal       Left Ear: External ear normal       Nose: Nose normal    Eyes:      General: No scleral icterus  Right eye: No discharge  Left eye: No discharge  Pupils: Pupils are equal, round, and reactive to light  Neck:      Musculoskeletal: Normal range of motion and neck supple  Thyroid: No thyromegaly  Cardiovascular:      Rate and Rhythm: Normal rate and regular rhythm  Pulses: Normal pulses  Pulmonary:      Effort: Pulmonary effort is normal  No respiratory distress        Breath sounds: Examination of the right-upper field reveals decreased breath sounds  Examination of the left-upper field reveals decreased breath sounds  Examination of the right-middle field reveals decreased breath sounds  Examination of the right-lower field reveals decreased breath sounds  Examination of the left-lower field reveals decreased breath sounds  Decreased breath sounds present  No wheezing, rhonchi or rales  Abdominal:      General: Bowel sounds are normal  There is no distension  Palpations: Abdomen is soft  Musculoskeletal:      Right lower leg: No edema  Left lower leg: No edema  Skin:     General: Skin is warm and dry  Capillary Refill: Capillary refill takes less than 2 seconds  Neurological:      Mental Status: She is alert  Mental status is at baseline  Psychiatric:         Mood and Affect: Mood is anxious  Affect is flat           Speech: Speech normal                    Medications/ Allergies:     Current Facility-Administered Medications   Medication Dose Route Frequency Provider Last Rate    acetaminophen  650 mg Oral Q6H PRN Ana Mcdonough PA-C      ARIPiprazole  15 mg Oral BID Ana Mcdonough PA-C      ascorbic acid  1,000 mg Oral Q12H [facility] Virginia Revankar, DO      aspirin  81 mg Oral Daily Ana Mcdonough PA-C      cholecalciferol  2,000 Units Oral Daily [facility] Revankar, DO      clonazePAM  0 5 mg Oral BID Ana Mcdonough PA-C      digoxin  125 mcg [facility]      enoxaparin  30 mg Subcutaneous Q24H [facility] Ana Mcdonough PA-C      ferrous sulfate  325 mg Oral Daily With Breakfast Ana Mcdonough PA-C      hydrocortisone sodium succinate  10 mg Intravenous Daily Ana Mcdonough PA-C      hydrocortisone sodium succinate  15 mg Intravenous Early Morning Ana Mcdonough PA-C      levothyroxine  50 mcg Oral Early Morning Ana Mcdonough PA-C      metoprolol succinate  25 mg Oral HS NANI Bhat      midodrine  5 mg Oral TID AC NANI Bhat  zinc sulfate  220 mg Oral Daily Virginia Leia       Followed by   Rajiv Sanchez ON 2/17/2021] multivitamin-minerals  1 tablet Oral Daily Virginia Dupree DO      ondansetron  4 mg Intravenous Q6H PRN Chip Reynoso, HERNÁN      polyethylene glycol  17 g Oral Daily Lonlynn Reynoso, PA-GLENN      potassium-sodium phosphateS  1 tablet Oral TID With Meals Chip Reynoso, HERNÁN      senna-docusate sodium  1 tablet Oral HS NANI Tesfaye      thiamine  100 mg Oral Daily Lonlynn Reynoso, HERNÁN      traZODone  50 mg Oral HS Chip Reynoso, HERNÁN       acetaminophen, 650 mg, Q6H PRN  ondansetron, 4 mg, Q6H PRN      Allergies   Allergen Reactions    Codeine     Penicillins     Sulfa Antibiotics        VTE Pharmacologic Prophylaxis:   Sequential compression device (Venodyne)     Labs:   Troponins:  Results from last 7 days   Lab Units 02/04/21  0914 02/04/21  0619   TROPONIN I ng/mL 0 25* 0 28*       CBC with diff:  Results from last 7 days   Lab Units 02/09/21  0607 02/04/21  0619   WBC Thousand/uL 6 35 9 61   HEMOGLOBIN g/dL 9 1* 11 5   HEMATOCRIT % 28 3* 34 9   MCV fL 86 84   PLATELETS Thousands/uL 265 247   MCH pg 27 7 27 5   MCHC g/dL 32 2 33 0   RDW % 15 0 13 8   MPV fL 9 6 10 6   NRBC AUTO /100 WBCs 0 0       CMP:  Results from last 7 days   Lab Units 02/09/21  0607 02/07/21  0528 02/06/21  0501 02/04/21  0619   SODIUM mmol/L 139 138 139 133*   POTASSIUM mmol/L 3 5 4 2 3 3* 4 2   CHLORIDE mmol/L 102 102 101 99*   CO2 mmol/L 32 31 31 26   ANION GAP mmol/L 5 5 7 8   BUN mg/dL 12 7 7 8   CREATININE mg/dL 0 61 0 53* 0 42* 0 65   CALCIUM mg/dL 8 0* 8 2* 8 5 8 4   AST U/L  --   --   --  32   ALT U/L  --   --   --  25   ALK PHOS U/L  --   --   --  42*   TOTAL PROTEIN g/dL  --   --   --  5 8*   ALBUMIN g/dL  --   --   --  2 9*   TOTAL BILIRUBIN mg/dL  --   --   --  0 20   EGFR ml/min/1 73sq m 95 100 108 93       Magnesium:  Results from last 7 days   Lab Units 02/04/21  0619   MAGNESIUM mg/dL 2 0       Imaging & Testing   I have personally reviewed pertinent reports  Xr Chest 1 View Portable    Result Date: 2/1/2021  Narrative: CHEST INDICATION:   Weakness  Patient has suspected COVID-19  COMPARISON:  Chest radiograph from 4/3/2011  Thoracic spine CT from today which includes a large portion of the lungs  EXAM PERFORMED/VIEWS:  XR CHEST PORTABLE FINDINGS: Cardiomediastinal silhouette appears unremarkable  The lungs are clear  No pneumothorax or pleural effusion  Healed left rib fractures  Impression: No acute cardiopulmonary disease  Workstation performed: [id number]     Ct Head Without Contrast    Result Date: 2/1/2021  Narrative: CT BRAIN - WITHOUT CONTRAST INDICATION:   Trauma  COMPARISON:  4/3/2011  TECHNIQUE:  CT examination of the brain was performed  In addition to axial images, sagittal and coronal 2D reformatted images were created and submitted for interpretation  Radiation dose length product (DLP) for this visit:  843 3 mGy-cm   This examination, like all CT scans performed in the [facility], was performed utilizing techniques to minimize radiation dose exposure, including the use of iterative reconstruction and automated exposure control  IMAGE QUALITY:  Diagnostic  FINDINGS: PARENCHYMA:  No intracranial mass, mass effect or midline shift  No CT signs of acute infarction  No acute parenchymal hemorrhage  VENTRICLES AND EXTRA-AXIAL SPACES:  Normal for the patient's age  VISUALIZED ORBITS AND PARANASAL SINUSES:  Unremarkable  CALVARIUM AND EXTRACRANIAL SOFT TISSUES:  Normal      Impression: No acute intracranial abnormality  Workstation performed: [id number]     Ct Cervical Spine Without Contrast    Result Date: 2/1/2021  Narrative: CT CERVICAL SPINE - WITHOUT CONTRAST INDICATION:   Trauma  COMPARISON:  None  TECHNIQUE:  CT examination of the cervical spine was performed without intravenous contrast   Contiguous axial images were obtained  Sagittal and coronal reconstructions were performed  Radiation dose length product (DLP) for this visit:  128 5 mGy-cm   This examination, like all CT scans performed in the [facility], was performed utilizing techniques to minimize radiation dose exposure, including the use of iterative reconstruction and automated exposure control  IMAGE QUALITY:  Diagnostic  FINDINGS: ALIGNMENT:  Normal alignment of the cervical spine  No subluxation  VERTEBRAL BODIES:  No fracture  DEGENERATIVE CHANGES:  No significant cervical degenerative changes are noted  PREVERTEBRAL AND PARASPINAL SOFT TISSUES:  Unremarkable  THORACIC INLET:  Normal      Impression: No cervical spine fracture or traumatic malalignment  Workstation performed: [id number]     Ct Thoracic Spine Without Contrast    Result Date: 2/1/2021  Narrative: CT THORACIC AND LUMBAR SPINE INDICATION:   Trauma  COMPARISON:  None  TECHNIQUE:  Contiguous axial images were obtained  Sagittal and coronal reconstructions were performed  Radiation dose length product (DLP) for this visit:  252 8 mGy-cm  (accession [id number]), 0 mGy-cm  (accession [id number])  This examination, like all CT scans performed in the [facility], was performed utilizing techniques to minimize radiation dose exposure, including the use of iterative reconstruction and automated exposure control  IMAGE QUALITY:  Diagnostic  FINDINGS: ALIGNMENT:  Normal alignment of the thoracolumbar spine  No subluxation  VERTEBRAL BODIES: No fracture  DEGENERATIVE CHANGES:  No significant degenerative change is identified  PARASPINAL SOFT TISSUES: Normal      Impression: No fracture or malalignment identified  Workstation performed: [id number]     Ct Lumbar Spine Without Contrast    Result Date: 2/1/2021  Narrative: CT THORACIC AND LUMBAR SPINE INDICATION:   Trauma  COMPARISON:  None  TECHNIQUE:  Contiguous axial images were obtained  Sagittal and coronal reconstructions were performed    Radiation dose length product (DLP) for this visit: 252 8 mGy-cm  (accession [id number]), 0 mGy-cm  (accession [id number])  This examination, like all CT scans performed in the [facility], was performed utilizing techniques to minimize radiation dose exposure, including the use of iterative reconstruction and automated exposure control  IMAGE QUALITY:  Diagnostic  FINDINGS: ALIGNMENT:  Normal alignment of the thoracolumbar spine  No subluxation  VERTEBRAL BODIES: No fracture  DEGENERATIVE CHANGES:  No significant degenerative change is identified  PARASPINAL SOFT TISSUES: Normal      Impression: No fracture or malalignment identified  Workstation performed: [id number]        EKG / Monitor: Personally reviewed  Sinus rhythm heart rates improved with low-dose beta-blocker    Cardiac testing:   Results for orders placed during the hospital encounter of 21   Echo complete with contrast if indicated    Narrative [facility] [address]    Transthoracic Echocardiogram  2D, M-mode, Doppler, and Color Doppler    Study date:  2021    Patient: Lynnette Jensen  MR number: [id number]  Account number: [de-identified]  : 1955  Age: 72 years  Gender: Female  Status: Inpatient  Location: [place]  Height: 59 in  Weight: 70 lb  BP: 95/ 73 mmHg    Indications: Cardiomyopathy    Diagnoses: I42 9 - Cardiomyopathy, unspecified    Sonographer:  MARTHA Montanez  Referring Physician:  Lyla Bruner MD  Group:  [facility]  Interpreting Physician:  Velma Lockett MD    SUMMARY    LEFT VENTRICLE:  Systolic function was severely reduced by visual assessment  Ejection fraction was estimated in the range of 25 % to 30 %  There was of the mid-apical anterior, mid anteroseptal, mid inferoseptal, mid-apical inferior, apical septal, apical lateral, and apical wall(s)  This pattern is very suggestive of a stress induced cardiomyopathy      RIGHT VENTRICLE:  The size was normal   Systolic function was normal     MITRAL VALVE:  There was moderate regurgitation  AORTIC VALVE:  There was trace regurgitation  TRICUSPID VALVE:  There was mild regurgitation  AORTA:  There was dilatation of the ascending aorta at 29 mm in diameter  PERICARDIUM:  A trace pericardial effusion was identified  HISTORY: PRIOR HISTORY: DM2, HTN    PROCEDURE: The study was performed in the [facility]  This was a routine study  The transthoracic approach was used  The study included complete 2D imaging, M-mode, complete spectral Doppler, and color Doppler  The heart rate was 120 bpm, at  the start of the study  Images were obtained from the parasternal, apical, subcostal, and suprasternal notch acoustic windows  Image quality was adequate  LEFT VENTRICLE: Size was normal  Systolic function was severely reduced by visual assessment  Ejection fraction was estimated in the range of 25 % to 30 %  There was of the mid-apical anterior, mid anteroseptal, mid inferoseptal,  mid-apical inferior, apical septal, apical lateral, and apical wall(s)  This pattern is very suggestive of a stress induced cardiomyopathy  Wall thickness was normal  DOPPLER: Left ventricular diastolic function parameters were normal     RIGHT VENTRICLE: The size was normal  Systolic function was normal  Wall thickness was normal     LEFT ATRIUM: Size was normal     RIGHT ATRIUM: Size was normal     MITRAL VALVE: Valve structure was normal  There was normal leaflet separation  DOPPLER: The transmitral velocity was within the normal range  There was no evidence for stenosis  There was moderate regurgitation  AORTIC VALVE: The valve was trileaflet  Leaflets exhibited normal thickness and normal cuspal separation  DOPPLER: Transaortic velocity was within the normal range  There was no evidence for stenosis  There was trace regurgitation  TRICUSPID VALVE: The valve structure was normal  There was normal leaflet separation   DOPPLER: The transtricuspid velocity was within the normal range  There was no evidence for stenosis  There was mild regurgitation  PULMONIC VALVE: Leaflets exhibited normal thickness, no calcification, and normal cuspal separation  DOPPLER: The transpulmonic velocity was within the normal range  There was no significant regurgitation  PERICARDIUM: A trace pericardial effusion was identified  The pericardium was normal in appearance  AORTA: The root exhibited normal size  There was dilatation of the ascending aorta at 29 mm in diameter  SYSTEMIC VEINS: IVC: The inferior vena cava was normal in size  Respirophasic changes were normal     MEASUREMENT TABLES    OTHER ECHO MEASUREMENTS  (Reference normals)  Estimated CVP   15 mmHg   (--)    SYSTEM MEASUREMENT TABLES    2D  %FS: 10 39 %  Ao Diam: 2 96 cm  EDV(Teich): 52 49 ml  EF(Teich): 23 28 %  ESV(Teich): 40 27 ml  HR_2Ch_Q: 114 65 BPM  HR_4Ch_Q: 116 12 BPM  IVSd: 0 49 cm  LA Area: 11 59 cm2  LA Diam: 2 51 cm  LVCO_2Ch_Q: 1 98 L/min  LVCO_4Ch_Q: 1 62 L/min  LVCO_BiP_Q: 1 8 L/min  LVEF_2Ch_Q: 32 77 %  LVEF_4Ch_Q: 29 39 %  LVEF_BiP_Q: 31 31 %  LVIDd: 3 55 cm  LVIDs: 3 18 cm  LVLd_2Ch_Q: 6 71 cm  LVLd_4Ch_Q: 6 08 cm  LVLs_2Ch_Q: 6 23 cm  LVLs_4Ch_Q: 5 71 cm  LVPWd: 0 62 cm  LVSV_2Ch_Q: 17 25 ml  LVSV_4Ch_Q: 13 98 ml  LVSV_BiP_Q: 15 76 ml  LVVED_2Ch_Q: 52 64 ml  LVVED_4Ch_Q: 47 57 ml  LVVED_BiP_Q: 50 33 ml  LVVES_2Ch_Q: 35 39 ml  LVVES_4Ch_Q: 33 59 ml  LVVES_BiP_Q: 34 57 ml  RA Area: 8 2 cm2  RVIDd: 2 52 cm  RWT: 0 35  SV(Teich): 12 22 ml    CW  TR Vmax: 2 37 m/s  TR maxP 41 mmHg    MM  TAPSE: 1 49 cm    [facility] Accredited Echocardiography [facility]    Prepared and electronically signed by    Nacho Coburn MD  Signed 2021 14:33:59         NANI Ospina        "This note has been constructed using a voice recognition system  Therefore there may be syntax, spelling, and/or grammatical errors   Please call if you have any questions  "

## 2021-02-10 NOTE — PLAN OF CARE
Problem: Potential for Falls  Goal: Patient will remain free of falls  Description: INTERVENTIONS:  - Assess patient frequently for physical needs  -  Identify cognitive and physical deficits and behaviors that affect risk of falls    -  [place] fall precautions as indicated by assessment   - Educate patient/family on patient safety including physical limitations  - Instruct patient to call for assistance with activity based on assessment  - Modify environment to reduce risk of injury  - Consider OT/PT consult to assist with strengthening/mobility  Outcome: Progressing     Problem: Prexisting or High Potential for Compromised Skin Integrity  Goal: Skin integrity is maintained or improved  Description: INTERVENTIONS:  - Identify patients at risk for skin breakdown  - Assess and monitor skin integrity  - Assess and monitor nutrition and hydration status  - Monitor labs   - Assess for incontinence   - Turn and reposition patient  - Assist with mobility/ambulation  - Relieve pressure over bony prominences  - Avoid friction and shearing  - Provide appropriate hygiene as needed including keeping skin clean and dry  - Evaluate need for skin moisturizer/barrier cream  - Collaborate with interdisciplinary team   - Patient/family teaching  - Consider wound care consult   Outcome: Progressing     Problem: PAIN - ADULT  Goal: Verbalizes/displays adequate comfort level or baseline comfort level  Description: Interventions:  - Encourage patient to monitor pain and request assistance  - Assess pain using appropriate pain scale  - Administer analgesics based on type and severity of pain and evaluate response  - Implement non-pharmacological measures as appropriate and evaluate response  - Consider cultural and social influences on pain and pain management  - Notify physician/advanced practitioner if interventions unsuccessful or patient reports new pain  Outcome: Progressing     Problem: INFECTION - ADULT  Goal: Absence or prevention of progression during hospitalization  Description: INTERVENTIONS:  - Assess and monitor for signs and symptoms of infection  - Monitor lab/diagnostic results  - Monitor all insertion sites, i e  indwelling lines, tubes, and drains  - Monitor endotracheal if appropriate and nasal secretions for changes in amount and color  - [place] appropriate cooling/warming therapies per order  - Administer medications as ordered  - Instruct and encourage patient and family to use good hand hygiene technique  - Identify and instruct in appropriate isolation precautions for identified infection/condition  Outcome: Progressing     Problem: SAFETY ADULT  Goal: Patient will remain free of falls  Description: INTERVENTIONS:  - Assess patient frequently for physical needs  -  Identify cognitive and physical deficits and behaviors that affect risk of falls    -  [place] fall precautions as indicated by assessment   - Educate patient/family on patient safety including physical limitations  - Instruct patient to call for assistance with activity based on assessment  - Modify environment to reduce risk of injury  - Consider OT/PT consult to assist with strengthening/mobility  Outcome: Progressing  Goal: Maintain or return to baseline ADL function  Description: INTERVENTIONS:  -  Assess patient's ability to carry out ADLs; assess patient's baseline for ADL function and identify physical deficits which impact ability to perform ADLs (bathing, care of mouth/teeth, toileting, grooming, dressing, etc )  - Assess/evaluate cause of self-care deficits   - Assess range of motion  - Assess patient's mobility; develop plan if impaired  - Assess patient's need for assistive devices and provide as appropriate  - Encourage maximum independence but intervene and supervise when necessary  - Involve family in performance of ADLs  - Assess for home care needs following discharge   - Consider OT consult to assist with ADL evaluation and planning for discharge  - Provide patient education as appropriate  Outcome: Progressing  Goal: Maintain or return mobility status to optimal level  Description: INTERVENTIONS:  - Assess patient's baseline mobility status (ambulation, transfers, stairs, etc )    - Identify cognitive and physical deficits and behaviors that affect mobility  - Identify mobility aids required to assist with transfers and/or ambulation (gait belt, sit-to-stand, lift, walker, cane, etc )  - [place] fall precautions as indicated by assessment  - Record patient progress and toleration of activity level on Mobility SBAR; progress patient to next Phase/Stage  - Instruct patient to call for assistance with activity based on assessment  - Consider rehabilitation consult to assist with strengthening/weightbearing, etc   Outcome: Progressing     Problem: DISCHARGE PLANNING  Goal: Discharge to home or other facility with appropriate resources  Description: INTERVENTIONS:  - Identify barriers to discharge w/patient and caregiver  - Arrange for needed discharge resources and transportation as appropriate  - Identify discharge learning needs (meds, wound care, etc )  - Arrange for interpretive services to assist at discharge as needed  - Refer to Case Management Department for coordinating discharge planning if the patient needs post-hospital services based on physician/advanced practitioner order or complex needs related to functional status, cognitive ability, or social support system  Outcome: Progressing     Problem: Knowledge Deficit  Goal: Patient/family/caregiver demonstrates understanding of disease process, treatment plan, medications, and discharge instructions  Description: Complete learning assessment and assess knowledge base    Interventions:  - Provide teaching at level of understanding  - Provide teaching via preferred learning methods  Outcome: Progressing     Problem: CARDIOVASCULAR - ADULT  Goal: Maintains optimal cardiac output and hemodynamic stability  Description: INTERVENTIONS:  - Monitor I/O, vital signs and rhythm  - Monitor for S/S and trends of decreased cardiac output  - Administer and titrate ordered vasoactive medications to optimize hemodynamic stability  - Assess quality of pulses, skin color and temperature  - Assess for signs of decreased coronary artery perfusion  - Instruct patient to report change in severity of symptoms  Outcome: Progressing  Goal: Absence of cardiac dysrhythmias or at baseline rhythm  Description: INTERVENTIONS:  - Continuous cardiac monitoring, vital signs, obtain 12 lead EKG if ordered  - Administer antiarrhythmic and heart rate control medications as ordered  - Monitor electrolytes and administer replacement therapy as ordered  Outcome: Progressing     Problem: CARDIOVASCULAR - ADULT  Goal: Maintains optimal cardiac output and hemodynamic stability  Description: INTERVENTIONS:  - Monitor I/O, vital signs and rhythm  - Monitor for S/S and trends of decreased cardiac output  - Administer and titrate ordered vasoactive medications to optimize hemodynamic stability  - Assess quality of pulses, skin color and temperature  - Assess for signs of decreased coronary artery perfusion  - Instruct patient to report change in severity of symptoms  Outcome: Progressing  Goal: Absence of cardiac dysrhythmias or at baseline rhythm  Description: INTERVENTIONS:  - Continuous cardiac monitoring, vital signs, obtain 12 lead EKG if ordered  - Administer antiarrhythmic and heart rate control medications as ordered  - Monitor electrolytes and administer replacement therapy as ordered  Outcome: Progressing     Problem: Nutrition/Hydration-ADULT  Goal: Nutrient/Hydration intake appropriate for improving, restoring or maintaining nutritional needs  Description: Monitor and assess patient's nutrition/hydration status for malnutrition  Collaborate with interdisciplinary team and initiate plan and interventions as ordered  Monitor patient's weight and dietary intake as ordered or per policy  Utilize nutrition screening tool and intervene as necessary  Determine patient's food preferences and provide high-protein, high-caloric foods as appropriate       INTERVENTIONS:  - Monitor oral intake, urinary output, labs, and treatment plans  - Assess nutrition and hydration status and recommend course of action  - Evaluate amount of meals eaten  - Assist patient with eating if necessary   - Allow adequate time for meals  - Recommend/ encourage appropriate diets, oral nutritional supplements, and vitamin/mineral supplements  - Order, calculate, and assess calorie counts as needed  - Recommend, monitor, and adjust tube feedings and TPN/PPN based on assessed needs  - Assess need for intravenous fluids  - Provide specific nutrition/hydration education as appropriate  - Include patient/family/caregiver in decisions related to nutrition  Outcome: Progressing
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
## 2021-02-10 NOTE — NURSING NOTE
Unsuccessful with blood draw this AM x1  Patient refused any further attempts this morning  Relayed to [employer] who is taking over for this patient

## 2021-02-10 NOTE — CASE MANAGEMENT
LOS - 6 days    SW following to assist with DCP  STR placement is being recommended  Referrals had been made initially last week  Pt has now tested positive for COVID  SW spoke with pt about having to expand facility search due to COVID status  Pt agreeable with continuing to explore placement is both [place] and [place] and verbalized understanding of having to make additional referrals  SW updated referrals to [place]  In process PASRR was completed and found to be positive necessitating the Level II PASRR process be completed  SW contacted Мария Packer at [facility] to discuss need  Once all clinical is available (need psychiatric evaluation) information will be faxed to Ms Kamar Bergman to begin process  SW also updated referrals to [place] facilities  SW will continue to follow to monitor progress and assist with planning as needed

## 2021-02-11 PROBLEM — K59.00 CONSTIPATION: Status: RESOLVED | Noted: 2020-01-01 | Resolved: 2021-01-01

## 2021-02-11 NOTE — NURSING NOTE
Patient's HR noted to be elevated (120's-130's bpm)  Patient c/o nausea and lightheadedness/dizziness  Patient denied presence of chest pain or SOB  Vital signs were taken  02/11/21 0137  98 2 °F (36 8 °C)  --  --  --  --  --  --  --  --  --   02/11/21 0045  --  124Abnormal   20  133/57  --  93 %  --  --  None (Room       [facility]  CNRP came up to the bedside to evaluate  As per CRNP's orders, an EKG was completed and bloodwork was drawn  A fingerstick glucose was obtained (80 mg/dL)  Metoprolol Succinate (TOPROL-XL) 24 tablet 25 mg was administered as ordered  (Patient had previously refused bedtime dose )  Patient noted to have a decreased oxygen saturation 86% and NC 3L was applied  (Follow-up oxygen saturation was 96%)  Patient denies presence of SOB  This was communicated to GLENN Mix  Patient reports "feeling better" and reports the absence of nausea and lightheadedness/dizziness  Vitals were re-evaluated  Date/Time  Temp  Pulse  Resp  BP  MAP (mmHg)  SpO2  Calculated FIO2 (%) - Nasal Cannula  Nasal Cannula O2 Flow Rate (L/min)  O2 Device  Patient Position - Orthostatic VS   02/11/21 0358  --  83  18  101/60  --  97 %  26  1 5 L/min  Nasal cannula  Lyi     At this time, patient resting comfortably in bed  Will continue to monitor

## 2021-02-11 NOTE — PROGRESS NOTES
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[facility] )    Thyroid disorder    Hypotension    Generalized weakness    Dilated cardiomyopathy ([facility] )      Assessment & Plan:    * Elevated troponin  Assessment & Plan  Patient presented to the ED at [facility] with an elevated troponin peaked at 3 6   Likely non MI troponin elevation in the setting of stress cardiomyopathy- patient underwent echo which showed an EF of 30%  Cardiology consult appreciated  Patient underwent nuclear stress test  which was markedly abnormal with mild diffuse decrease in uptake in apical and mid walls involving all the walls  LVEF was severely decreased with apical ballooning  Continue medical management as per Cardiology  Sinus tachycardia  Assessment & Plan  Patient was loaded with digoxin and started on digoxin 125 micrograms p o  Daily which has been discontinued  Patient on Toprol-XL 25 milligram p o  Daily at bedtime along with midodrine 5 milligram p o  T i d  Patient noted to be tachycardic overnight and given Toprol-XL and heart rate has improved  Patient's orthostatic vital signs were negative  COVID-19 virus infection  Assessment & Plan  Patient tested positive for COVID, remains asymptomatic  Mild disease  · Per RN, patient noted to be hypoxic overnight and was placed on oxygen  · As patient now requiring oxygen, started on remdesivir and Decadron    This is discussed with patient and family  · Check and trend inflammatory markers, CRP, ferritin  · Patient was educated and encouraged self proing  · Increase activity and mobilization as tolerated    Results from last 7 days   Lab Units 02/08/21  1803   SARS-COV-2  Positive*     Results from last 7 days   Lab Units 02/11/21  0131 02/09/21  1559 02/09/21  1401   CRP mg/L 85 4*  --  109 7*   FERRITIN ng/mL 355 172  --    D-DIMER QUANTITATIVE ug/ml FEU 1 20* 1 14*  --                  Generalized weakness  Assessment & Plan  Generalized weakness - multifactorial, hyponatremia, steroid, thyroid medication adjustments  Currently generalized weakness is most likely 2/2 failure to thrive, severe protein calorie malnutrition    - PT OT  Patient to be discharged to short-term rehabilitation    Hypotension  Assessment & Plan  Patient's home medications were on hold due to borderline low blood pressure  Patient was started on low-dose beta-blocker due to sinus tachycardia  midodrine 5 milligram p o  T i d  was added due to low BP  Thyroid disorder  Assessment & Plan  Levothyroxine dose increased to 50 mcg daily  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[facility] )  Assessment & Plan  Continue IV hydrocortisone  -appreciate Endocrinology recommendations  Patient can resume home dose hydrocortisone upon discharge  Severe protein-calorie malnutrition ([facility] )  Assessment & Plan  Malnutrition Findings:   Adult Malnutrition type: Chronic illness(Severe protein calorie malnutrition due to inadequate energy intake as evidenced by hollow orbits, depressed temples and protruding clavicles  Treated with Regular diet and supplements)   Adult Degree of Malnutrition: Other severe protein calorie malnutrition  BMI Findings:  Adult BMI Classifications: Underweight < 18 5  Body mass index is 16 01 kg/m²  Nutrition consult appreciated   Continue diet with nutritional supplementation    FTT (failure to thrive) in adult  Assessment & Plan  Patient has a history of anorexia nervosa  Continue thiamine, phosphate supplements  PT/OT  Patient will be discharged to short-term rehabilitation      Constipation-resolved as of 2021  Assessment & Plan  Patient did not have a bowel movement for 1 week  Continue MiraLax 17 grams p o  Daily  Patient had a large brown bowel movement with enema on     Hyponatremia-resolved as of 2/10/2021  Assessment & Plan  Multifactorial  poor oral intake, adrenal insufficiency, SIADH  - sodium was 120 on admission and has since improved  - IVF discontinued    Results from last 7 days   Lab Units 21  0607 21  0528 21  0501 21  0619   SODIUM mmol/L 139 138 139 133*         VTE Pharmacologic Prophylaxis:   Pharmacologic: Enoxaparin (Lovenox)  Mechanical VTE Prophylaxis in Place: Yes    Patient Centered Rounds: I have performed bedside rounds with nursing staff today  Discussions with Specialists or Other Care Team Provider: yes - cardio    Education and Discussions with Family / Patient: yes - George Gould    Time Spent for Care: 30 min  More than 50% of total time spent on counseling and coordination of care as described above  Current Length of Stay: 7 day(s)    Current Patient Status: Inpatient   Certification Statement: The patient will continue to require additional inpatient hospital stay due to COVID infection now requiring oxygen    Discharge Plan: STR    Code Status: Level 3 - DNAR and DNI      Subjective:     Denies any shortness of breath    Objective:     Vitals:   Temp (24hrs), Av 9 °F (36 6 °C), Min:97 5 °F (36 4 °C), Max:98 2 °F (36 8 °C)    Temp:  [97 5 °F (36 4 °C)-98 2 °F (36 8 °C)] 98 2 °F (36 8 °C)  HR:  [] 94  Resp:  [13-20] 18  BP: ()/(53-82) 110/67  SpO2:  [93 %-97 %] 97 %  Body mass index is 16 01 kg/m²  Input and Output Summary (last 24 hours): Intake/Output Summary (Last 24 hours) at 2021 0957  Last data filed at 2021 0604  Gross per 24 hour   Intake --   Output 700 ml   Net -700 ml       Physical Exam:     Physical Exam  Constitutional:       General: She is not in acute distress       Appearance: She is ill-appearing  She is not diaphoretic  Comments: Thin female   HENT:      Head: Normocephalic and atraumatic  Eyes:      General:         Right eye: No discharge  Left eye: No discharge  Cardiovascular:      Rate and Rhythm: Normal rate and regular rhythm  Pulmonary:      Effort: No respiratory distress  Breath sounds: No wheezing or rales  Comments: Decreased breath sounds bilaterally although patient with poor inspiratory effort  Abdominal:      General: Bowel sounds are normal  There is no distension  Palpations: Abdomen is soft  Tenderness: There is no abdominal tenderness  Neurological:      Mental Status: She is alert  Additional Data:     Labs:    Results from last 7 days   Lab Units 02/11/21  0131 02/09/21  0607   WBC Thousand/uL 8 19 6 35   HEMOGLOBIN g/dL 11 5 9 1*   HEMATOCRIT % 37 0 28 3*   PLATELETS Thousands/uL 344 265   NEUTROS PCT %  --  87*   LYMPHS PCT %  --  8*   MONOS PCT %  --  4   EOS PCT %  --  0     Results from last 7 days   Lab Units 02/11/21  0131   POTASSIUM mmol/L 3 8   CHLORIDE mmol/L 98*   CO2 mmol/L 33*   BUN mg/dL 12   CREATININE mg/dL 0 62   CALCIUM mg/dL 8 0*   ALK PHOS U/L 55   ALT U/L 22   AST U/L 32           * I Have Reviewed All Lab Data Listed Above  * Additional Pertinent Lab Tests Reviewed:  Mis 66 Admission Reviewed      Imaging:  Imaging Reports Reviewed Today Include: CXR  Imaging Personally Reviewed by Myself Includes:  N/A    Recent Cultures (last 7 days):           Last 24 Hours Medication List:   Current Facility-Administered Medications   Medication Dose Route Frequency Provider Last Rate    acetaminophen  650 mg Oral Q6H PRN ALISHA España-GLENN      ARIPiprazole  15 mg Oral BID ALISHA España-GLENN      ascorbic acid  1,000 mg Oral Q12H [address] Virginia Revankar, DO      aspirin  81 mg Oral Daily ALISHA España-C      cholecalciferol  2,000 Units Oral Daily Virginia Revankar, DO      clonazePAM  0 5 mg Oral BID Nany Stern PA-C      dexamethasone  6 mg Intravenous Q24H Virginia Revjenniferar, DO      enoxaparin  30 mg Subcutaneous Q24H [facility] Nany Stern PA-C      ferrous sulfate  325 mg Oral Daily With Breakfast Nany Stern PA-C      hydrocortisone sodium succinate  10 mg Intravenous Daily Nany Stern PA-C      hydrocortisone sodium succinate  15 mg Intravenous Early Morning Nany Stern PA-C      levothyroxine  50 mcg Oral Early Morning Nany Stern PA-C      metoprolol succinate  25 mg Oral HS NANI Nunez      midodrine  5 mg Oral TID AC NANI Nunez      zinc sulfate  220 mg Oral Daily Virginia RevDO prabhu      Followed by   Diogo Lewis ON 2/17/2021] multivitamin-minerals  1 tablet Oral Daily Virginia Revjenniferar, DO      ondansetron  4 mg Intravenous Q6H PRN Nany Stern PA-C      polyethylene glycol  17 g Oral Daily Nany Stern PA-C      potassium-sodium phosphateS  1 tablet Oral TID With Meals Nany Stern PA-C      remdesivir  200 mg Intravenous Q24H Virginia RevjenniferarDO      Followed by   Diogo Lewis ON 2/12/2021] remdesivir  100 mg Intravenous Q24H Virginiaraquel Dupree, DO      senna-docusate sodium  1 tablet Oral HS NANI Nunez      thiamine  100 mg Oral Daily Nany Stern PA-C      traZODone  50 mg Oral HS Nany Stern PA-C            Today, Patient Was Seen By: Susanne Blanco DO    ** Please Note: "This note has been constructed using a voice recognition system  Therefore there may be syntax, spelling, and/or grammatical errors   Please call if you have any questions  "**

## 2021-02-11 NOTE — PLAN OF CARE
Problem: Potential for Falls  Goal: Patient will remain free of falls  Description: INTERVENTIONS:  - Assess patient frequently for physical needs  -  Identify cognitive and physical deficits and behaviors that affect risk of falls    -  [place] fall precautions as indicated by assessment   - Educate patient/family on patient safety including physical limitations  - Instruct patient to call for assistance with activity based on assessment  - Modify environment to reduce risk of injury  - Consider OT/PT consult to assist with strengthening/mobility  Outcome: Progressing     Problem: Prexisting or High Potential for Compromised Skin Integrity  Goal: Skin integrity is maintained or improved  Description: INTERVENTIONS:  - Identify patients at risk for skin breakdown  - Assess and monitor skin integrity  - Assess and monitor nutrition and hydration status  - Monitor labs   - Assess for incontinence   - Turn and reposition patient  - Assist with mobility/ambulation  - Relieve pressure over bony prominences  - Avoid friction and shearing  - Provide appropriate hygiene as needed including keeping skin clean and dry  - Evaluate need for skin moisturizer/barrier cream  - Collaborate with interdisciplinary team   - Patient/family teaching  - Consider wound care consult   Outcome: Progressing     Problem: PAIN - ADULT  Goal: Verbalizes/displays adequate comfort level or baseline comfort level  Description: Interventions:  - Encourage patient to monitor pain and request assistance  - Assess pain using appropriate pain scale  - Administer analgesics based on type and severity of pain and evaluate response  - Implement non-pharmacological measures as appropriate and evaluate response  - Consider cultural and social influences on pain and pain management  - Notify physician/advanced practitioner if interventions unsuccessful or patient reports new pain  Outcome: Progressing     Problem: INFECTION - ADULT  Goal: Absence or prevention of progression during hospitalization  Description: INTERVENTIONS:  - Assess and monitor for signs and symptoms of infection  - Monitor lab/diagnostic results  - Monitor all insertion sites, i e  indwelling lines, tubes, and drains  - [place] appropriate cooling/warming therapies per order  - Administer medications as ordered  - Instruct and encourage patient and family to use good hand hygiene technique  - Identify and instruct in appropriate isolation precautions for identified infection/condition  Outcome: Progressing     Problem: SAFETY ADULT  Goal: Patient will remain free of falls  Description: INTERVENTIONS:  - Assess patient frequently for physical needs  -  Identify cognitive and physical deficits and behaviors that affect risk of falls    -  [place] fall precautions as indicated by assessment   - Educate patient/family on patient safety including physical limitations  - Instruct patient to call for assistance with activity based on assessment  - Modify environment to reduce risk of injury  - Consider OT/PT consult to assist with strengthening/mobility  Outcome: Progressing  Goal: Maintain or return to baseline ADL function  Description: INTERVENTIONS:  -  Assess patient's ability to carry out ADLs; assess patient's baseline for ADL function and identify physical deficits which impact ability to perform ADLs (bathing, care of mouth/teeth, toileting, grooming, dressing, etc )  - Assess/evaluate cause of self-care deficits   - Assess range of motion  - Assess patient's mobility; develop plan if impaired  - Assess patient's need for assistive devices and provide as appropriate  - Encourage maximum independence but intervene and supervise when necessary  - Involve family in performance of ADLs  - Assess for home care needs following discharge   - Consider OT consult to assist with ADL evaluation and planning for discharge  - Provide patient education as appropriate  Outcome: Progressing  Goal: Maintain or return mobility status to optimal level  Description: INTERVENTIONS:  - Assess patient's baseline mobility status (ambulation, transfers, stairs, etc )    - Identify cognitive and physical deficits and behaviors that affect mobility  - Identify mobility aids required to assist with transfers and/or ambulation (gait belt, sit-to-stand, lift, walker, cane, etc )  - [place] fall precautions as indicated by assessment  - Record patient progress and toleration of activity level on Mobility SBAR; progress patient to next Phase/Stage  - Instruct patient to call for assistance with activity based on assessment  - Consider rehabilitation consult to assist with strengthening/weightbearing, etc   Outcome: Progressing     Problem: DISCHARGE PLANNING  Goal: Discharge to home or other facility with appropriate resources  Description: INTERVENTIONS:  - Identify barriers to discharge w/patient and caregiver  - Arrange for needed discharge resources and transportation as appropriate  - Identify discharge learning needs (meds, wound care, etc )  - Arrange for interpretive services to assist at discharge as needed  - Refer to Case Management Department for coordinating discharge planning if the patient needs post-hospital services based on physician/advanced practitioner order or complex needs related to functional status, cognitive ability, or social support system  Outcome: Progressing     Problem: Knowledge Deficit  Goal: Patient/family/caregiver demonstrates understanding of disease process, treatment plan, medications, and discharge instructions  Description: Complete learning assessment and assess knowledge base    Interventions:  - Provide teaching at level of understanding  - Provide teaching via preferred learning methods  Outcome: Progressing     Problem: CARDIOVASCULAR - ADULT  Goal: Maintains optimal cardiac output and hemodynamic stability  Description: INTERVENTIONS:  - Monitor I/O, vital signs and rhythm  - Monitor for S/S and trends of decreased cardiac output  - Administer and titrate ordered vasoactive medications to optimize hemodynamic stability  - Assess quality of pulses, skin color and temperature  - Assess for signs of decreased coronary artery perfusion  - Instruct patient to report change in severity of symptoms  Outcome: Progressing  Goal: Absence of cardiac dysrhythmias or at baseline rhythm  Description: INTERVENTIONS:  - Continuous cardiac monitoring, vital signs, obtain 12 lead EKG if ordered  - Administer antiarrhythmic and heart rate control medications as ordered  - Monitor electrolytes and administer replacement therapy as ordered  Outcome: Progressing     Problem: Nutrition/Hydration-ADULT  Goal: Nutrient/Hydration intake appropriate for improving, restoring or maintaining nutritional needs  Description: Monitor and assess patient's nutrition/hydration status for malnutrition  Collaborate with interdisciplinary team and initiate plan and interventions as ordered  Monitor patient's weight and dietary intake as ordered or per policy  Utilize nutrition screening tool and intervene as necessary  Determine patient's food preferences and provide high-protein, high-caloric foods as appropriate       INTERVENTIONS:  - Monitor oral intake, urinary output, labs, and treatment plans  - Assess nutrition and hydration status and recommend course of action  - Evaluate amount of meals eaten  - Assist patient with eating if necessary   - Allow adequate time for meals  - Recommend/ encourage appropriate diets, oral nutritional supplements, and vitamin/mineral supplements  - Order, calculate, and assess calorie counts as needed  - Recommend, monitor, and adjust tube feedings and TPN/PPN based on assessed needs  - Assess need for intravenous fluids  - Provide specific nutrition/hydration education as appropriate  - Include patient/family/caregiver in decisions related to nutrition  Outcome: Progressing

## 2021-02-12 NOTE — PROGRESS NOTES
[facility] Internal Medicine Progress Note  Patient: Sasha Posada 72 y o  female   MRN: [id number]  PCP: Marry Blevins DO  Unit/Bed#: [address] [place] [address] Encounter: [id number]  Date Of Visit: 02/12/21    Problem List:    Principal Problem:    Elevated troponin  Active Problems:    Sinus tachycardia    COVID-19 virus infection    FTT (failure to thrive) in adult    Severe protein-calorie malnutrition (HCC)    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HCC)    Thyroid disorder    Hypotension    Generalized weakness      Assessment & Plan:    * Elevated troponin  Assessment & Plan  Patient presented to the ED at [place] with an elevated troponin peaked at 3 6  Likely non MI troponin elevation in the setting of stress cardiomyopathy- patient underwent echo which showed an EF of 30%  Cardiology consult appreciated  Patient underwent nuclear stress test  which was markedly abnormal with mild diffuse decrease in uptake in apical and mid walls involving all the walls  LVEF was severely decreased with apical ballooning  Continue medical management as per Cardiology  Sinus tachycardia  Assessment & Plan  Patient was loaded with digoxin and started on digoxin 125 micrograms p o  Daily which has been discontinued  Patient on Toprol-XL 25 milligram p o  Daily at bedtime along with midodrine 5 milligram p o  T i d    heart rate has improved overall compared to before  Patient's orthostatic vital signs were negative  COVID-19 virus infection  Assessment & Plan  Patient tested positive for COVID, Mild disease  · Per RN, patient noted to be hypoxic and was placed on oxygen  Spoke with RN to titrate off oxygen as tolerated  · As patient requiring oxygen, she has been started on remdesivir and Decadron    This is discussed with patient and family  · Check and trend inflammatory markers, CRP, ferritin  · Patient was educated and encouraged self proing  · Increase activity and mobilization as tolerated    Results from last 7 days Lab Units 02/08/21  1803   SARS-COV-2  Positive*     Results from last 7 days   Lab Units 02/12/21  0611 02/11/21  0131 02/09/21  1559 02/09/21  1401   CRP mg/L 229 1* 85 4*  --  109 7*   FERRITIN ng/mL  --  355 172  --    D-DIMER QUANTITATIVE ug/ml FEU  --  1 20* 1 14*  --                  Generalized weakness  Assessment & Plan  Generalized weakness - multifactorial, hyponatremia, steroid, thyroid medication adjustments  Currently generalized weakness is most likely 2/2 failure to thrive, severe protein calorie malnutrition    - PT OT  Patient to be discharged to short-term rehabilitation  Hypotension  Assessment & Plan  Patient's home medications were on hold due to borderline low blood pressure  Patient was started on low-dose beta-blocker due to sinus tachycardia   midodrine 5 milligram p o  T i d  was added due to low BP  Thyroid disorder  Assessment & Plan  Levothyroxine dose increased to 50 mcg daily    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HCC)  Assessment & Plan  Continue IV hydrocortisone   -appreciate Endocrinology recommendations  Patient can resume home dose hydrocortisone upon discharge  Severe protein-calorie malnutrition ([address] )  Assessment & Plan  Malnutrition Findings:   Adult Malnutrition type: Chronic illness(Severe protein calorie malnutrition due to inadequate energy intake as evidenced by hollow orbits, depressed temples and protruding clavicles  Treated with Regular diet and supplements)   Adult Degree of Malnutrition: Other severe protein calorie malnutrition  BMI Findings:  Adult BMI Classifications: Underweight < 18 5  Body mass index is 16 36 kg/m²  Nutrition consult appreciated   Continue diet with nutritional supplementation    FTT (failure to thrive) in adult  Assessment & Plan  Patient has a history of anorexia nervosa  Continue thiamine, phosphate supplements  PT/OT  Patient will be discharged to short-term rehabilitation      Constipation-resolved as of 2021  Assessment & Plan  Patient did not have a bowel movement for 1 week  Continue MiraLax 17 grams p o  Daily  Patient had a large brown bowel movement with enema on     Hyponatremia-resolved as of 2/10/2021  Assessment & Plan  Multifactorial 2 poor oral intake, adrenal insufficiency, SIADH  - sodium was 120 on admission and has since improved  - IVF discontinued    Results from last 7 days   Lab Units 21  0607 21  0528 21  0501 21  0619   SODIUM mmol/L 139 138 139 133*         VTE Pharmacologic Prophylaxis:   Pharmacologic: Enoxaparin (Lovenox)  Mechanical VTE Prophylaxis in Place: Yes    Patient Centered Rounds: I have performed bedside rounds with nursing staff today  Discussions with Specialists or Other Care Team Provider: yes - cardio    Education and Discussions with Family / Patient: kiera Burnett    Time Spent for Care: 25 min  More than 50% of total time spent on counseling and coordination of care as described above  Current Length of Stay: 8 day(s)    Current Patient Status: Inpatient   Certification Statement: The patient will continue to require additional inpatient hospital stay due to COVID infection    Discharge Plan: STR    Code Status: Level 3 - DNAR and DNI      Subjective:     Patient states she is doing okay  Does not want IV steroids    Objective:     Vitals:   Temp (24hrs), Av 5 °F (37 5 °C), Min:98 2 °F (36 8 °C), Max:101 4 °F (38 6 °C)    Temp:  [98 2 °F (36 8 °C)-101 4 °F (38 6 °C)] 98 2 °F (36 8 °C)  HR:  [67-92] 79  Resp:  [14-19] 14  BP: ()/(48-68) 101/58  SpO2:  [88 %-100 %] 100 %  Body mass index is 16 36 kg/m²  Input and Output Summary (last 24 hours):     No intake or output data in the 24 hours ending 21 0934    Physical Exam:     Physical Exam  Constitutional:       General: She is not in acute distress  Appearance: She is ill-appearing  She is not diaphoretic        Comments: cachectic HENT:      Head: Normocephalic and atraumatic  Cardiovascular:      Rate and Rhythm: Normal rate and regular rhythm  Pulmonary:      Effort: Pulmonary effort is normal  No respiratory distress  Breath sounds: No wheezing or rales  Comments: Decreased breath sounds bilaterally  Abdominal:      General: Bowel sounds are normal  There is no distension  Palpations: Abdomen is soft  Tenderness: There is no abdominal tenderness  Musculoskeletal:      Right lower leg: No edema  Left lower leg: No edema  Neurological:      Mental Status: She is alert  Comments: Oriented to self and time  Thought that she is at [facility]         Additional Data:     Labs:    Results from last 7 days   Lab Units 02/12/21  0611  02/09/21  0607   WBC Thousand/uL 9 81   < > 6 35   HEMOGLOBIN g/dL 9 7*   < > 9 1*   HEMATOCRIT % 30 0*   < > 28 3*   PLATELETS Thousands/uL 250   < > 265   NEUTROS PCT %  --   --  87*   LYMPHS PCT %  --   --  8*   MONOS PCT %  --   --  4   EOS PCT %  --   --  0    < > = values in this interval not displayed  Results from last 7 days   Lab Units 02/12/21  0611   POTASSIUM mmol/L 3 2*   CHLORIDE mmol/L 101   CO2 mmol/L 32   BUN mg/dL 14   CREATININE mg/dL 0 50*   CALCIUM mg/dL 7 8*   ALK PHOS U/L 41*   ALT U/L 17   AST U/L 37           * I Have Reviewed All Lab Data Listed Above  * Additional Pertinent Lab Tests Reviewed:  Mis 66 Admission Reviewed      Imaging:  Imaging Reports Reviewed Today Include: CXR  Imaging Personally Reviewed by Myself Includes:  N/A    Recent Cultures (last 7 days):           Last 24 Hours Medication List:   Current Facility-Administered Medications   Medication Dose Route Frequency Provider Last Rate    acetaminophen  650 mg Oral Q6H PRN Elizabeth Lynne PA-C      ARIPiprazole  15 mg Oral BID Elizabeth Lynne PA-C      ascorbic acid  1,000 mg Oral Q12H [facility] Virginia Dupree DO      aspirin  81 mg Oral Daily Candance Brooklyn Jarrell Ga PA-C      cholecalciferol  2,000 Units Oral Daily Virginia Revankar, DO      clonazePAM  0 5 mg Oral BID Sary Solis PA-C      dexamethasone  6 mg Intravenous Q24H Virginia Revankar, DO      enoxaparin  30 mg Subcutaneous Q24H [address] Sary Solis PA-C      ferrous sulfate  325 mg Oral Daily With Breakfast Sary Solis PA-C      hydrocortisone sodium succinate  10 mg Intravenous Daily Sary Solis PA-C      hydrocortisone sodium succinate  15 mg Intravenous Early Morning Sary Solis PA-C      levothyroxine  50 mcg Oral Early Morning Sary Solis PA-C      metoprolol succinate  25 mg Oral HS NANI Churchill      midodrine  5 mg Oral TID AC NANI Churchill      zinc sulfate  220 mg Oral Daily Virginiaraquel Dupree DO      Followed by   Courtney Echevarria ON 2/17/2021] multivitamin-minerals  1 tablet Oral Daily Virginia Revankar, DO      ondansetron  4 mg Intravenous Q6H PRN Sary Solis PA-C      polyethylene glycol  17 g Oral Daily Sary Solis PA-C      potassium chloride  40 mEq Oral Once Virginia Revankar, DO      potassium-sodium phosphateS  1 tablet Oral TID With Meals Sary Solis PA-C      remdesivir  100 mg Intravenous Q24H Virginia Revankar, DO      senna-docusate sodium  1 tablet Oral HS NANI Churchill      thiamine  100 mg Oral Daily Sary Solis PA-C      traZODone  50 mg Oral HS Sary Solis PA-C            Today, Patient Was Seen By: Jose Antonio Regalado DO    ** Please Note: "This note has been constructed using a voice recognition system  Therefore there may be syntax, spelling, and/or grammatical errors   Please call if you have any questions  "**

## 2021-02-12 NOTE — PLAN OF CARE
Problem: Potential for Falls  Goal: Patient will remain free of falls  Description: INTERVENTIONS:  - Assess patient frequently for physical needs  -  Identify cognitive and physical deficits and behaviors that affect risk of falls    -  [place] fall precautions as indicated by assessment   - Educate patient/family on patient safety including physical limitations  - Instruct patient to call for assistance with activity based on assessment  - Modify environment to reduce risk of injury  - Consider OT/PT consult to assist with strengthening/mobility  Outcome: Progressing     Problem: Prexisting or High Potential for Compromised Skin Integrity  Goal: Skin integrity is maintained or improved  Description: INTERVENTIONS:  - Identify patients at risk for skin breakdown  - Assess and monitor skin integrity  - Assess and monitor nutrition and hydration status  - Monitor labs   - Assess for incontinence   - Turn and reposition patient  - Assist with mobility/ambulation  - Relieve pressure over bony prominences  - Avoid friction and shearing  - Provide appropriate hygiene as needed including keeping skin clean and dry  - Evaluate need for skin moisturizer/barrier cream  - Collaborate with interdisciplinary team   - Patient/family teaching  - Consider wound care consult   Outcome: Progressing     Problem: PAIN - ADULT  Goal: Verbalizes/displays adequate comfort level or baseline comfort level  Description: Interventions:  - Encourage patient to monitor pain and request assistance  - Assess pain using appropriate pain scale  - Administer analgesics based on type and severity of pain and evaluate response  - Implement non-pharmacological measures as appropriate and evaluate response  - Consider cultural and social influences on pain and pain management  - Notify physician/advanced practitioner if interventions unsuccessful or patient reports new pain  Outcome: Progressing     Problem: INFECTION - ADULT  Goal: Absence or prevention of progression during hospitalization  Description: INTERVENTIONS:  - Assess and monitor for signs and symptoms of infection  - Monitor lab/diagnostic results  - Monitor all insertion sites, i e  indwelling lines, tubes, and drains  - [place] appropriate cooling/warming therapies per order  - Administer medications as ordered  - Instruct and encourage patient and family to use good hand hygiene technique  - Identify and instruct in appropriate isolation precautions for identified infection/condition  Outcome: Progressing     Problem: SAFETY ADULT  Goal: Patient will remain free of falls  Description: INTERVENTIONS:  - Assess patient frequently for physical needs  -  Identify cognitive and physical deficits and behaviors that affect risk of falls    -  [place] fall precautions as indicated by assessment   - Educate patient/family on patient safety including physical limitations  - Instruct patient to call for assistance with activity based on assessment  - Modify environment to reduce risk of injury  - Consider OT/PT consult to assist with strengthening/mobility  Outcome: Progressing  Goal: Maintain or return to baseline ADL function  Description: INTERVENTIONS:  -  Assess patient's ability to carry out ADLs; assess patient's baseline for ADL function and identify physical deficits which impact ability to perform ADLs (bathing, care of mouth/teeth, toileting, grooming, dressing, etc )  - Assess/evaluate cause of self-care deficits   - Assess range of motion  - Assess patient's mobility; develop plan if impaired  - Assess patient's need for assistive devices and provide as appropriate  - Encourage maximum independence but intervene and supervise when necessary  - Involve family in performance of ADLs  - Assess for home care needs following discharge   - Consider OT consult to assist with ADL evaluation and planning for discharge  - Provide patient education as appropriate  Outcome: Progressing  Goal: Maintain or return mobility status to optimal level  Description: INTERVENTIONS:  - Assess patient's baseline mobility status (ambulation, transfers, stairs, etc )    - Identify cognitive and physical deficits and behaviors that affect mobility  - Identify mobility aids required to assist with transfers and/or ambulation (gait belt, sit-to-stand, lift, walker, cane, etc )  - [place] fall precautions as indicated by assessment  - Record patient progress and toleration of activity level on Mobility SBAR; progress patient to next Phase/Stage  - Instruct patient to call for assistance with activity based on assessment  - Consider rehabilitation consult to assist with strengthening/weightbearing, etc   Outcome: Progressing     Problem: DISCHARGE PLANNING  Goal: Discharge to home or other facility with appropriate resources  Description: INTERVENTIONS:  - Identify barriers to discharge w/patient and caregiver  - Arrange for needed discharge resources and transportation as appropriate  - Identify discharge learning needs (meds, wound care, etc )  - Arrange for interpretive services to assist at discharge as needed  - Refer to Case Management Department for coordinating discharge planning if the patient needs post-hospital services based on physician/advanced practitioner order or complex needs related to functional status, cognitive ability, or social support system  Outcome: Progressing     Problem: Knowledge Deficit  Goal: Patient/family/caregiver demonstrates understanding of disease process, treatment plan, medications, and discharge instructions  Description: Complete learning assessment and assess knowledge base    Interventions:  - Provide teaching at level of understanding  - Provide teaching via preferred learning methods  Outcome: Progressing     Problem: CARDIOVASCULAR - ADULT  Goal: Maintains optimal cardiac output and hemodynamic stability  Description: INTERVENTIONS:  - Monitor I/O, vital signs and rhythm  - Monitor for S/S and trends of decreased cardiac output  - Administer and titrate ordered vasoactive medications to optimize hemodynamic stability  - Assess quality of pulses, skin color and temperature  - Assess for signs of decreased coronary artery perfusion  - Instruct patient to report change in severity of symptoms  Outcome: Progressing  Goal: Absence of cardiac dysrhythmias or at baseline rhythm  Description: INTERVENTIONS:  - Continuous cardiac monitoring, vital signs, obtain 12 lead EKG if ordered  - Administer antiarrhythmic and heart rate control medications as ordered  - Monitor electrolytes and administer replacement therapy as ordered  Outcome: Progressing     Problem: Nutrition/Hydration-ADULT  Goal: Nutrient/Hydration intake appropriate for improving, restoring or maintaining nutritional needs  Description: Monitor and assess patient's nutrition/hydration status for malnutrition  Collaborate with interdisciplinary team and initiate plan and interventions as ordered  Monitor patient's weight and dietary intake as ordered or per policy  Utilize nutrition screening tool and intervene as necessary  Determine patient's food preferences and provide high-protein, high-caloric foods as appropriate       INTERVENTIONS:  - Monitor oral intake, urinary output, labs, and treatment plans  - Assess nutrition and hydration status and recommend course of action  - Evaluate amount of meals eaten  - Assist patient with eating if necessary   - Allow adequate time for meals  - Recommend/ encourage appropriate diets, oral nutritional supplements, and vitamin/mineral supplements  - Order, calculate, and assess calorie counts as needed  - Recommend, monitor, and adjust tube feedings and TPN/PPN based on assessed needs  - Assess need for intravenous fluids  - Provide specific nutrition/hydration education as appropriate  - Include patient/family/caregiver in decisions related to nutrition  Outcome: Progressing

## 2021-02-13 NOTE — PLAN OF CARE
Problem: Potential for Falls  Goal: Patient will remain free of falls  Description: INTERVENTIONS:  - Assess patient frequently for physical needs  -  Identify cognitive and physical deficits and behaviors that affect risk of falls    -  [place] fall precautions as indicated by assessment   - Educate patient/family on patient safety including physical limitations  - Instruct patient to call for assistance with activity based on assessment  - Modify environment to reduce risk of injury  - Consider OT/PT consult to assist with strengthening/mobility  Outcome: Progressing     Problem: Prexisting or High Potential for Compromised Skin Integrity  Goal: Skin integrity is maintained or improved  Description: INTERVENTIONS:  - Identify patients at risk for skin breakdown  - Assess and monitor skin integrity  - Assess and monitor nutrition and hydration status  - Monitor labs   - Assess for incontinence   - Turn and reposition patient  - Assist with mobility/ambulation  - Relieve pressure over bony prominences  - Avoid friction and shearing  - Provide appropriate hygiene as needed including keeping skin clean and dry  - Evaluate need for skin moisturizer/barrier cream  - Collaborate with interdisciplinary team   - Patient/family teaching  - Consider wound care consult   Outcome: Progressing     Problem: PAIN - ADULT  Goal: Verbalizes/displays adequate comfort level or baseline comfort level  Description: Interventions:  - Encourage patient to monitor pain and request assistance  - Assess pain using appropriate pain scale  - Administer analgesics based on type and severity of pain and evaluate response  - Implement non-pharmacological measures as appropriate and evaluate response  - Consider cultural and social influences on pain and pain management  - Notify physician/advanced practitioner if interventions unsuccessful or patient reports new pain  Outcome: Progressing     Problem: INFECTION - ADULT  Goal: Absence or prevention of progression during hospitalization  Description: INTERVENTIONS:  - Assess and monitor for signs and symptoms of infection  - Monitor lab/diagnostic results  - Monitor all insertion sites, i e  indwelling lines, tubes, and drains  - [place] appropriate cooling/warming therapies per order  - Administer medications as ordered  - Instruct and encourage patient and family to use good hand hygiene technique  - Identify and instruct in appropriate isolation precautions for identified infection/condition  Outcome: Progressing     Problem: SAFETY ADULT  Goal: Patient will remain free of falls  Description: INTERVENTIONS:  - Assess patient frequently for physical needs  -  Identify cognitive and physical deficits and behaviors that affect risk of falls    -  [place] fall precautions as indicated by assessment   - Educate patient/family on patient safety including physical limitations  - Instruct patient to call for assistance with activity based on assessment  - Modify environment to reduce risk of injury  - Consider OT/PT consult to assist with strengthening/mobility  Outcome: Progressing  Goal: Maintain or return to baseline ADL function  Description: INTERVENTIONS:  -  Assess patient's ability to carry out ADLs; assess patient's baseline for ADL function and identify physical deficits which impact ability to perform ADLs (bathing, care of mouth/teeth, toileting, grooming, dressing, etc )  - Assess/evaluate cause of self-care deficits   - Assess range of motion  - Assess patient's mobility; develop plan if impaired  - Assess patient's need for assistive devices and provide as appropriate  - Encourage maximum independence but intervene and supervise when necessary  - Involve family in performance of ADLs  - Assess for home care needs following discharge   - Consider OT consult to assist with ADL evaluation and planning for discharge  - Provide patient education as appropriate  Outcome: Progressing  Goal: Maintain or return mobility status to optimal level  Description: INTERVENTIONS:  - Assess patient's baseline mobility status (ambulation, transfers, stairs, etc )    - Identify cognitive and physical deficits and behaviors that affect mobility  - Identify mobility aids required to assist with transfers and/or ambulation (gait belt, sit-to-stand, lift, walker, cane, etc )  - [place] fall precautions as indicated by assessment  - Record patient progress and toleration of activity level on Mobility SBAR; progress patient to next Phase/Stage  - Instruct patient to call for assistance with activity based on assessment  - Consider rehabilitation consult to assist with strengthening/weightbearing, etc   Outcome: Progressing     Problem: DISCHARGE PLANNING  Goal: Discharge to home or other facility with appropriate resources  Description: INTERVENTIONS:  - Identify barriers to discharge w/patient and caregiver  - Arrange for needed discharge resources and transportation as appropriate  - Identify discharge learning needs (meds, wound care, etc )  - Arrange for interpretive services to assist at discharge as needed  - Refer to Case Management Department for coordinating discharge planning if the patient needs post-hospital services based on physician/advanced practitioner order or complex needs related to functional status, cognitive ability, or social support system  Outcome: Progressing     Problem: Knowledge Deficit  Goal: Patient/family/caregiver demonstrates understanding of disease process, treatment plan, medications, and discharge instructions  Description: Complete learning assessment and assess knowledge base    Interventions:  - Provide teaching at level of understanding  - Provide teaching via preferred learning methods  Outcome: Progressing     Problem: CARDIOVASCULAR - ADULT  Goal: Maintains optimal cardiac output and hemodynamic stability  Description: INTERVENTIONS:  - Monitor I/O, vital signs and rhythm  - Monitor for S/S and trends of decreased cardiac output  - Administer and titrate ordered vasoactive medications to optimize hemodynamic stability  - Assess quality of pulses, skin color and temperature  - Assess for signs of decreased coronary artery perfusion  - Instruct patient to report change in severity of symptoms  Outcome: Progressing  Goal: Absence of cardiac dysrhythmias or at baseline rhythm  Description: INTERVENTIONS:  - Continuous cardiac monitoring, vital signs, obtain 12 lead EKG if ordered  - Administer antiarrhythmic and heart rate control medications as ordered  - Monitor electrolytes and administer replacement therapy as ordered  Outcome: Progressing     Problem: Nutrition/Hydration-ADULT  Goal: Nutrient/Hydration intake appropriate for improving, restoring or maintaining nutritional needs  Description: Monitor and assess patient's nutrition/hydration status for malnutrition  Collaborate with interdisciplinary team and initiate plan and interventions as ordered  Monitor patient's weight and dietary intake as ordered or per policy  Utilize nutrition screening tool and intervene as necessary  Determine patient's food preferences and provide high-protein, high-caloric foods as appropriate       INTERVENTIONS:  - Monitor oral intake, urinary output, labs, and treatment plans  - Assess nutrition and hydration status and recommend course of action  - Evaluate amount of meals eaten  - Assist patient with eating if necessary   - Allow adequate time for meals  - Recommend/ encourage appropriate diets, oral nutritional supplements, and vitamin/mineral supplements  - Order, calculate, and assess calorie counts as needed  - Assess need for intravenous fluids  - Provide specific nutrition/hydration education as appropriate  - Include patient/family/caregiver in decisions related to nutrition  Outcome: Progressing

## 2021-02-14 PROBLEM — Z71.89 GOALS OF CARE, COUNSELING/DISCUSSION: Status: ACTIVE | Noted: 2021-01-01

## 2021-02-14 NOTE — PROGRESS NOTES
[facility] Internal Medicine Progress Note  Patient: Milton Flank 72 y o  female   MRN: [id number]  PCP: Merissa Manuel DO  Unit/Bed#: [address] [place] [address] Encounter: [id number]  Date Of Visit: 02/14/21    Problem List:    Principal Problem:    Elevated troponin  Active Problems:    Sinus tachycardia    COVID-19 virus infection    Goals of care, counseling/discussion    FTT (failure to thrive) in adult    Severe protein-calorie malnutrition (HCC)    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HCC)    Thyroid disorder    Hypotension    Generalized weakness      Assessment & Plan:    * Elevated troponin  Assessment & Plan  Patient presented to the ED at [place] with an elevated troponin peaked at 3 6  Likely non MI troponin elevation in the setting of stress cardiomyopathy- patient underwent echo which showed an EF of 30%  Cardiology consult appreciated  Patient underwent nuclear stress test  which was markedly abnormal with mild diffuse decrease in uptake in apical and mid walls involving all the walls  LVEF was severely decreased with apical ballooning  Continue medical management as per Cardiology  Sinus tachycardia  Assessment & Plan  Patient was loaded with digoxin and started on digoxin 125 micrograms p o  Daily which has been discontinued  Patient on Toprol-XL 25 milligram p o  Daily at bedtime along with midodrine 5 milligram p o  T i d    heart rate has improved overall compared to before  Per Cardiology, patient with history of sinus tachycardia over the last 2-3 years  Patient's orthostatic vital signs were negative  Goals of care, counseling/discussion  Assessment & Plan  Spoke with patient's son Vern Thomas and discussed patient's multiple comorbidities including cardiac issues, failure to thrive, COVID infection etc   Patient's mental status also seems to wax and wane  Spoke with him that her overall prognosis is poor and unclear how much she will recover from this if any  Patient also with decreased p o  Intake and at times refuses treatments  Son stated that he does not want any aggressive measures and that the patient has been declining even at baseline  Will continue current medical management  Repeat CTA head was done as patient appeared confused yesterday evening and it was negative for acute pathology    COVID-19 virus infection  Assessment & Plan  Patient tested positive for COVID, Mild disease  · Per RN, patient noted to be hypoxic and was placed on oxygen but currently stable on room air  · As patient was requiring oxygen, she has been started on remdesivir and Decadron  This was discussed with patient and family  · Check and trend inflammatory markers, CRP, ferritin  · Patient was educated and encouraged self proing  · Increase activity and mobilization as tolerated  Results from last 7 days   Lab Units 02/08/21  1803   SARS-COV-2  Positive*     Results from last 7 days   Lab Units 02/14/21  0524 02/13/21  0554 02/12/21  0611 02/11/21  0131 02/09/21  1559 02/09/21  1401   CRP mg/L 92 9* 154 3* 229 1* 85 4*  --  109 7*   FERRITIN ng/mL 525* 687* 503* 355 172  --    D-DIMER QUANTITATIVE ug/ml FEU 0 98*  --   --  1 20* 1 14*  --                  Generalized weakness  Assessment & Plan  Generalized weakness - multifactorial, hyponatremia, steroid, thyroid medication adjustments  Currently generalized weakness is most likely 2/2 failure to thrive, severe protein calorie malnutrition, COVID infection  - PT OT  Patient to be discharged to short-term rehabilitation      Hypotension  Assessment & Plan  Patient's home medications were on hold due to borderline low blood pressure  Patient was started on low-dose beta-blocker due to sinus tachycardia  midodrine 5 milligram p o  T i d  was added due to low BP    Thyroid disorder  Assessment & Plan  Levothyroxine dose increased to 50 mcg daily    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HCC)  Assessment & Plan  Continue IV hydrocortisone  -appreciate Endocrinology recommendations  Patient can resume home dose hydrocortisone upon discharge  Severe protein-calorie malnutrition ([address] )  Assessment & Plan  Malnutrition Findings:   Adult Malnutrition type: Chronic illness(Severe protein calorie malnutrition due to inadequate energy intake as evidenced by hollow orbits, depressed temples and protruding clavicles  Treated with Regular diet and supplements)   Adult Degree of Malnutrition: Other severe protein calorie malnutrition  BMI Findings:  Adult BMI Classifications: Underweight < 18 5  Body mass index is 16 17 kg/m²  Nutrition consult appreciated   Continue diet with nutritional supplementation    FTT (failure to thrive) in adult  Assessment & Plan  Patient has a history of anorexia nervosa  Continue thiamine, phosphate supplements  PT/OT  Patient will be discharged to short-term rehabilitation  Constipation-resolved as of 2/11/2021  Assessment & Plan  Patient did not have a bowel movement for 1 week  Continue MiraLax 17 grams p o  Daily  Patient had a large brown bowel movement with enema on 02/05    Hyponatremia-resolved as of 2/10/2021  Assessment & Plan  Multifactorial 2/2 poor oral intake, adrenal insufficiency, SIADH  - sodium was 120 on admission and has since improved  - IVF discontinued    Results from last 7 days   Lab Units 02/14/21  0524 02/13/21  0554 02/12/21  0611 02/11/21  0131 02/09/21  0607   SODIUM mmol/L 143 138 138 136 139         VTE Pharmacologic Prophylaxis:   Pharmacologic: Enoxaparin (Lovenox)  Mechanical VTE Prophylaxis in Place: Yes    Patient Centered Rounds: I have performed bedside rounds with nursing staff today  Discussions with Specialists or Other Care Team Provider: yes - cardio    Education and Discussions with Family / Patient: Jeff Monroy    Time Spent for Care: 35 min  More than 50% of total time spent on counseling and coordination of care as described above      Current Length of Stay: 10 day(s)    Current Patient Status: Inpatient   Certification Statement: The patient will continue to require additional inpatient hospital stay due to COVID infection    Discharge Plan: STR    Code Status: Level 3 - DNAR and DNI      Subjective:     Per nursing staff, patient has been refusing medical treatment at times  Patient denies any shortness of breath  Objective:     Vitals:   Temp (24hrs), Av 8 °F (36 6 °C), Min:97 3 °F (36 3 °C), Max:98 3 °F (36 8 °C)    Temp:  [97 3 °F (36 3 °C)-98 3 °F (36 8 °C)] 97 3 °F (36 3 °C)  HR:  [65-97] 65  Resp:  [16-22] 16  BP: (114-127)/(78-83) 114/79  SpO2:  [94 %-98 %] 94 %  Body mass index is 16 17 kg/m²  Input and Output Summary (last 24 hours): Intake/Output Summary (Last 24 hours) at 2021 0954  Last data filed at 2021 0748  Gross per 24 hour   Intake --   Output 550 ml   Net -550 ml       Physical Exam:     Physical Exam  Constitutional:       General: She is not in acute distress  Appearance: She is ill-appearing  She is not diaphoretic  Comments: Cachectic   HENT:      Head: Normocephalic and atraumatic  Eyes:      General: No scleral icterus  Cardiovascular:      Rate and Rhythm: Normal rate and regular rhythm  Pulmonary:      Effort: Pulmonary effort is normal  No respiratory distress  Breath sounds: Normal breath sounds  No wheezing or rales  Abdominal:      General: Bowel sounds are normal  There is no distension  Palpations: Abdomen is soft  Tenderness: There is no abdominal tenderness  Musculoskeletal:      Right lower leg: No edema  Left lower leg: No edema  Neurological:      Mental Status: She is alert and oriented to person, place, and time           Additional Data:     Labs:    Results from last 7 days   Lab Units 21  0524  21  0607   WBC Thousand/uL 7 18   < > 6 35   HEMOGLOBIN g/dL 8 7*   < > 9 1*   HEMATOCRIT % 26 7*   < > 28 3*   PLATELETS Thousands/uL 273   < > 265   NEUTROS PCT %  --   --  87* LYMPHS PCT %  --   --  8*   MONOS PCT %  --   --  4   EOS PCT %  --   --  0    < > = values in this interval not displayed  Results from last 7 days   Lab Units 02/14/21  0524   POTASSIUM mmol/L 3 7   CHLORIDE mmol/L 107   CO2 mmol/L 32   BUN mg/dL 13   CREATININE mg/dL 0 40*   CALCIUM mg/dL 7 8*   ALK PHOS U/L 44*   ALT U/L 17   AST U/L 31           * I Have Reviewed All Lab Data Listed Above  * Additional Pertinent Lab Tests Reviewed:  Mis 66 Admission Reviewed      Imaging:  Imaging Reports Reviewed Today Include: CXR  Imaging Personally Reviewed by Myself Includes:  N/A    Recent Cultures (last 7 days):           Last 24 Hours Medication List:   Current Facility-Administered Medications   Medication Dose Route Frequency Provider Last Rate    acetaminophen  650 mg Oral Q6H PRN Festus Bloom PA-C      ARIPiprazole  15 mg Oral BID Festus Bloom PA-C      ascorbic acid  1,000 mg Oral Q12H [address], DO      aspirin  81 mg Oral Daily Festus Bloom PA-C      cholecalciferol  2,000 Units Oral Daily Virginia Revankar, DO      clonazePAM  0 5 mg Oral BID Festus Bloom PA-C      dexamethasone  6 mg Intravenous Q24H Virginia Revankar, DO      enoxaparin  30 mg Subcutaneous Q24H [facility] Festus Bloom PA-C      ferrous sulfate  325 mg Oral Daily With Breakfast Festus Bloom PA-C      hydrocortisone sodium succinate  10 mg Intravenous Daily Festus Bloom PA-C      hydrocortisone sodium succinate  15 mg Intravenous Early Morning Festus Bloom PA-C      levothyroxine  50 mcg Oral Early Morning Festus Bloom PA-C      metoprolol succinate  25 mg Oral HS Marella Cargo, CRNP      midodrine  5 mg Oral TID AC Marella Cargo, CRNP      zinc sulfate  220 mg Oral Daily Virginia Revankar, DO      Followed by   Nolberto Mayer ON 2/17/2021] multivitamin-minerals  1 tablet Oral Daily Virginia Revankar, DO      ondansetron  4 mg Intravenous Q6H PRN Festus Bloom PA-C      polyethylene glycol 17 g Oral Daily Elizabeth Lynne PA-C      potassium-sodium phosphateS  1 tablet Oral TID With Meals Elizabeth Lynne PA-C      remdesivir  100 mg Intravenous Q24H Virginia Dupree  mg (02/13/21 1037)    senna-docusate sodium  1 tablet Oral HS NANI Chowdhury      thiamine  100 mg Oral Daily Elizabeth Lynne PA-C      traZODone  50 mg Oral HS Elizabeth Lynne PA-C            Today, Patient Was Seen By: [place] DO    ** Please Note: "This note has been constructed using a voice recognition system  Therefore there may be syntax, spelling, and/or grammatical errors   Please call if you have any questions  "**

## 2021-02-14 NOTE — PHYSICAL THERAPY NOTE
PT TREATMENT     02/14/21 1610   PT Last Visit   PT Visit Date 02/14/21   Note Type   Note Type Treatment   Pain Assessment   Pain Assessment Tool 0-10   Pain Score No Pain   Restrictions/Precautions   Weight Bearing Precautions Per Order No   Other Precautions Contact/isolation; Airborne/isolation; Chair Alarm; Bed Alarm; Fall Risk   General   Chart Reviewed Yes   Family/Caregiver Present No   Cognition   Overall Cognitive Status WFL   Arousal/Participation Cooperative   Attention Attends with cues to redirect   Following Commands Follows one step commands with increased time or repetition   Subjective   Subjective "I'm on a bedpan but I don't think I went"   Bed Mobility   Supine to Sit   (Mod/Max A of 1)   Additional items Assist x 1;Verbal cues   Additional Comments Pt presents on left side on a bedpan   Transfers   Sit to Stand   (Mod / Max A )   Additional items Assist x 1;Verbal cues   Stand to Sit   (Mod / Max A )   Additional items Assist x 1;Verbal cues   Stand pivot   (Mod/Max A of 1 )   Additional items Assist x 1;Verbal cues  (bed>chair)   Additional Comments Attempted sit > stand to RW , only tolerated for a few seconds  did not get feet flat on floor, knees remained flexed, unable to fully extend  Ambulation/Elevation   Gait Assistance Not tested   Activity Tolerance   Activity Tolerance Patient limited by fatigue;Treatment limited secondary to medical complications (Comment)  (limited by severe weakness)   Nurse Made Aware yes: [place]   Assessment   Prognosis Good   Problem List Decreased strength;Decreased endurance; Impaired balance;Decreased mobility; Decreased coordination;Decreased skin integrity   Assessment Pt agreeable to PT; remains severly weak but wants to particpate  Pt in chair at end of session   RN and CNA aware  cont  PT  The patient's AM-PAC Basic Mobility Inpatient Short Form Raw Score is 9, Standardized Score is 30 25   A standardized score less than 42 9 suggests the patient may benefit from discharge to post-acute rehabilitation services  Please also refer to the recommendation of the Physical Therapist for safe discharge planning  Goals   Patient Goals to get stronger   Plan   Treatment/Interventions ADL retraining;Functional transfer training;LE strengthening/ROM; Therapeutic exercise; Endurance training;Patient/family training;Equipment eval/education; Bed mobility;Gait training;Spoke to nursing   Progress Slow progress, decreased activity tolerance   PT Frequency 5x/wk   Recommendation   PT Discharge Recommendation Post-Acute Rehabilitation Services   AM-PAC Basic Mobility Inpatient   Turning in Bed Without Bedrails 1   Lying on Back to Sitting on Edge of Flat Bed 2   Moving Bed to Chair 2   Standing Up From Chair 2   Walk in Room 1   Climb 3-5 Stairs 1   Basic Mobility Inpatient Raw Score 9   Turning Head Towards Sound 3   Follow Simple Instructions 3   Low Function Basic Mobility Raw Score 15   Low Function Basic Mobility Standardized Score 49 3   Licensure   [place] License Number  Alonza Dawson Villanueva PT  [id number]

## 2021-02-14 NOTE — ASSESSMENT & PLAN NOTE
Sanjana Espino -- Had discussed with patient's son Rohith Dailey and discussed patient's multiple comorbidities including cardiac issues, failure to thrive, COVID infection etc   Patient's mental status also seems to wax and wane  Spoke with him that her overall prognosis is poor and unclear how much she will recover from this if any  Patient also with decreased p o  Intake and at times refuses treatments  Son stated that he does not want any aggressive measures and that the patient has been declining even at baseline  Discussed with son again due to worsening oxygen requirement and patient being noncompliant with medical treatment here  He understands that she can get progressively worse if she continues to refuse treatment    This was also discussed with patient  Repeat CTA head was done as patient appeared confused previously and it was negative for acute pathology  Patient continues to have increasing oxygen requirements with poor oral intake and difficulty swallowing  Prolonged discussion again held with the patient regarding overall prognosis in coordination with pulmonology

## 2021-02-14 NOTE — PLAN OF CARE
Problem: Potential for Falls  Goal: Patient will remain free of falls  Description: INTERVENTIONS:  - Assess patient frequently for physical needs  -  Identify cognitive and physical deficits and behaviors that affect risk of falls    -  [place] fall precautions as indicated by assessment   - Educate patient/family on patient safety including physical limitations  - Instruct patient to call for assistance with activity based on assessment  - Modify environment to reduce risk of injury  - Consider OT/PT consult to assist with strengthening/mobility  Outcome: Progressing     Problem: Prexisting or High Potential for Compromised Skin Integrity  Goal: Skin integrity is maintained or improved  Description: INTERVENTIONS:  - Identify patients at risk for skin breakdown  - Assess and monitor skin integrity  - Assess and monitor nutrition and hydration status  - Monitor labs   - Assess for incontinence   - Turn and reposition patient  - Assist with mobility/ambulation  - Relieve pressure over bony prominences  - Avoid friction and shearing  - Provide appropriate hygiene as needed including keeping skin clean and dry  - Evaluate need for skin moisturizer/barrier cream  - Collaborate with interdisciplinary team   - Patient/family teaching  - Consider wound care consult   Outcome: Progressing     Problem: PAIN - ADULT  Goal: Verbalizes/displays adequate comfort level or baseline comfort level  Description: Interventions:  - Encourage patient to monitor pain and request assistance  - Assess pain using appropriate pain scale  - Administer analgesics based on type and severity of pain and evaluate response  - Implement non-pharmacological measures as appropriate and evaluate response  - Consider cultural and social influences on pain and pain management  - Notify physician/advanced practitioner if interventions unsuccessful or patient reports new pain  Outcome: Progressing     Problem: INFECTION - ADULT  Goal: Absence or prevention of progression during hospitalization  Description: INTERVENTIONS:  - Assess and monitor for signs and symptoms of infection  - Monitor lab/diagnostic results  - Monitor all insertion sites, i e  indwelling lines, tubes, and drains  - Erick appropriate cooling/warming therapies per order  - Administer medications as ordered  - Instruct and encourage patient and family to use good hand hygiene technique  - Identify and instruct in appropriate isolation precautions for identified infection/condition  Outcome: Progressing

## 2021-02-14 NOTE — PROGRESS NOTES
[facility] Internal Medicine Progress Note  Patient: Nicholas Kennedy 72 y o  female   MRN: [id number]  PCP: Philip Boland DO  Unit/Bed#: [address] [place] [address] Encounter: [id number]  Date Of Visit: 02/13/21    Problem List:    Principal Problem:    Elevated troponin  Active Problems:    Sinus tachycardia    COVID-19 virus infection    FTT (failure to thrive) in adult    Severe protein-calorie malnutrition (HCC)    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HCC)    Thyroid disorder    Hypotension    Generalized weakness      Assessment & Plan:    * Elevated troponin  Assessment & Plan  Patient presented to the ED at [facility] with an elevated troponin peaked at 3 6  Likely non MI troponin elevation in the setting of stress cardiomyopathy- patient underwent echo which showed an EF of 30%  Cardiology consult appreciated  Patient underwent nuclear stress test  which was markedly abnormal with mild diffuse decrease in uptake in apical and mid walls involving all the walls  LVEF was severely decreased with apical ballooning  Continue medical management as per Cardiology        Sinus tachycardia  Assessment & Plan  Patient was loaded with digoxin and started on digoxin 125 micrograms p o  Daily which has been discontinued  Patient on Toprol-XL 25 milligram p o  Daily at bedtime along with midodrine 5 milligram p o  T i d    heart rate has improved overall compared to before  Per Cardiology, patient with history of sinus tachycardia over the last 2-3 years  Patient's orthostatic vital signs were negative  COVID-19 virus infection  Assessment & Plan  Patient tested positive for COVID, Mild disease  · Per RN, patient noted to be hypoxic and was placed on oxygen but currently stable on room air  · As patient was requiring oxygen, she has been started on remdesivir and Decadron    This was discussed with patient and family  · Check and trend inflammatory markers, CRP, ferritin  · Patient was educated and encouraged self proing  · Increase activity and mobilization as tolerated    Results from last 7 days   Lab Units 02/08/21  1803   SARS-COV-2  Positive*     Results from last 7 days   Lab Units 02/13/21  0554 02/12/21  0611 02/11/21  0131 02/09/21  1559 02/09/21  1401   CRP mg/L 154 3* 229 1* 85 4*  --  109 7*   FERRITIN ng/mL 687* 503* 355 172  --    D-DIMER QUANTITATIVE ug/ml FEU  --   --  1 20* 1 14*  --                  Generalized weakness  Assessment & Plan  Generalized weakness - multifactorial, hyponatremia, steroid, thyroid medication adjustments  Currently generalized weakness is most likely 2/2 failure to thrive, severe protein calorie malnutrition, COVID infection    - PT OT  Patient to be discharged to short-term rehabilitation  Hypotension  Assessment & Plan  Patient's home medications were on hold due to borderline low blood pressure  Patient was started on low-dose beta-blocker due to sinus tachycardia  midodrine 5 milligram p o  T i d  was added due to low BP  Thyroid disorder  Assessment & Plan  Levothyroxine dose increased to 50 mcg daily  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[facility] )  Assessment & Plan  Continue IV hydrocortisone   -appreciate Endocrinology recommendations  Patient can resume home dose hydrocortisone upon discharge  Severe protein-calorie malnutrition ([facility] )  Assessment & Plan  Malnutrition Findings:   Adult Malnutrition type: Chronic illness(Severe protein calorie malnutrition due to inadequate energy intake as evidenced by hollow orbits, depressed temples and protruding clavicles  Treated with Regular diet and supplements)   Adult Degree of Malnutrition: Other severe protein calorie malnutrition  BMI Findings:  Adult BMI Classifications: Underweight < 18 5  Body mass index is 16 17 kg/m²  Nutrition consult appreciated   Continue diet with nutritional supplementation    FTT (failure to thrive) in adult  Assessment & Plan  Patient has a history of anorexia nervosa    Continue thiamine, phosphate supplements  PT/OT  Patient will be discharged to short-term rehabilitation  Constipation-resolved as of 2021  Assessment & Plan  Patient did not have a bowel movement for 1 week  Continue MiraLax 17 grams p o  Daily  Patient had a large brown bowel movement with enema on     Hyponatremia-resolved as of 2/10/2021  Assessment & Plan  Multifactorial 2/2 poor oral intake, adrenal insufficiency, SIADH  - sodium was 120 on admission and has since improved  - IVF discontinued    Results from last 7 days   Lab Units 21  0554 21  0611 21  0131 21  0607 21  0528   SODIUM mmol/L 138 138 136 139 138         VTE Pharmacologic Prophylaxis:   Pharmacologic: Enoxaparin (Lovenox)  Mechanical VTE Prophylaxis in Place: Yes    Patient Centered Rounds: I have performed bedside rounds with nursing staff today  Discussions with Specialists or Other Care Team Provider: yes - cardio    Education and Discussions with Family / Patient: George Gould    Time Spent for Care: 35 min  More than 50% of total time spent on counseling and coordination of care as described above  Current Length of Stay: 9 day(s)    Current Patient Status: Inpatient   Certification Statement: The patient will continue to require additional inpatient hospital stay due to COVID infection    Discharge Plan: STR    Code Status: Level 3 - DNAR and DNI      Subjective:     Patient denies any shortness of breath but keeps repeating herself that she wants her mouth cleaned    Objective:     Vitals:   Temp (24hrs), Av 3 °F (36 8 °C), Min:97 9 °F (36 6 °C), Max:98 8 °F (37 1 °C)    Temp:  [97 9 °F (36 6 °C)-98 8 °F (37 1 °C)] 97 9 °F (36 6 °C)  HR:  [85-97] 97  Resp:  [19-22] 22  BP: (119-127)/(70-78) 127/78  SpO2:  [94 %-97 %] 97 %  Body mass index is 16 17 kg/m²  Input and Output Summary (last 24 hours):        Intake/Output Summary (Last 24 hours) at 2021  Last data filed at 2021  Gross per 24 hour   Intake --   Output 600 ml   Net -600 ml       Physical Exam:     Physical Exam  Constitutional:       General: She is not in acute distress  Appearance: She is ill-appearing  She is not diaphoretic  Comments: Cachectic   HENT:      Head: Normocephalic and atraumatic  Eyes:      General:         Right eye: No discharge  Left eye: No discharge  Cardiovascular:      Rate and Rhythm: Normal rate and regular rhythm  Pulmonary:      Effort: No respiratory distress  Breath sounds: No wheezing or rales  Comments: Decreased breath sounds bilaterally although patient with poor inspiratory effort  Abdominal:      General: Bowel sounds are normal  There is no distension  Palpations: Abdomen is soft  Tenderness: There is no abdominal tenderness  Musculoskeletal:      Right lower leg: No edema  Left lower leg: No edema  Neurological:      Mental Status: She is alert  Additional Data:     Labs:    Results from last 7 days   Lab Units 02/13/21  0554  02/09/21  0607   WBC Thousand/uL 9 26   < > 6 35   HEMOGLOBIN g/dL 9 8*   < > 9 1*   HEMATOCRIT % 29 8*   < > 28 3*   PLATELETS Thousands/uL 263   < > 265   NEUTROS PCT %  --   --  87*   LYMPHS PCT %  --   --  8*   MONOS PCT %  --   --  4   EOS PCT %  --   --  0    < > = values in this interval not displayed  Results from last 7 days   Lab Units 02/13/21  0554   POTASSIUM mmol/L 3 0*   CHLORIDE mmol/L 102   CO2 mmol/L 32   BUN mg/dL 12   CREATININE mg/dL 0 45*   CALCIUM mg/dL 7 9*   ALK PHOS U/L 46   ALT U/L 17   AST U/L 32           * I Have Reviewed All Lab Data Listed Above  * Additional Pertinent Lab Tests Reviewed:  Mis 66 Admission Reviewed      Imaging:  Imaging Reports Reviewed Today Include: CXR  Imaging Personally Reviewed by Myself Includes:  N/A    Recent Cultures (last 7 days):           Last 24 Hours Medication List:   Current Facility-Administered Medications   Medication Dose Route Frequency Provider Last Rate    acetaminophen  650 mg Oral Q6H PRN Fadi Augustine PA-C      ARIPiprazole  15 mg Oral BID Fadi Augustine PA-C      ascorbic acid  1,000 mg Oral Q12H [facility] Virginia Revankar, DO      aspirin  81 mg Oral Daily Fadi Augustine PA-C      cholecalciferol  2,000 Units Oral Daily Virginia Revankar, DO      clonazePAM  0 5 mg Oral BID Fadi Augustine PA-C      dexamethasone  6 mg Intravenous Q24H Virginia Revankar, DO      enoxaparin  30 mg Subcutaneous Q24H [facility] Fadi Augustine PA-C      ferrous sulfate  325 mg Oral Daily With Breakfast Fadi Augustine PA-C      hydrocortisone sodium succinate  10 mg Intravenous Daily Fadi Augustine PA-C      hydrocortisone sodium succinate  15 mg Intravenous Early Morning Fadi Augustine PA-C      levothyroxine  50 mcg Oral Early Morning Fadi Augustine PA-C      metoprolol succinate  25 mg Oral HS NANI Whelan      midodrine  5 mg Oral TID AC NANI Whelan      zinc sulfate  220 mg Oral Daily Virginia Revankar, DO      Followed by   Ely Hemphill ON 2/17/2021] multivitamin-minerals  1 tablet Oral Daily Virginia Revankar, DO      ondansetron  4 mg Intravenous Q6H PRN Fadi Augustine PA-C      polyethylene glycol  17 g Oral Daily Fadi Augustine PA-C      potassium chloride  40 mEq Oral BID Virginia Revankar, DO      potassium-sodium phosphateS  1 tablet Oral TID With Meals Fadi Augustine PA-C      remdesivir  100 mg Intravenous Q24H Virginia Revankar,  mg (02/13/21 1037)    senna-docusate sodium  1 tablet Oral HS NANI Whelan      thiamine  100 mg Oral Daily Fadi Augustine PA-C      traZODone  50 mg Oral HS Fadi Augustine PA-C            Today, Patient Was Seen By: Alexandria Perez DO    ** Please Note: "This note has been constructed using a voice recognition system  Therefore there may be syntax, spelling, and/or grammatical errors   Please call if you have any questions  "**

## 2021-02-15 NOTE — PHYSICAL THERAPY NOTE
PT TREATMENT     02/15/21 0907   PT Last Visit   PT Visit Date 02/15/21   Note Type   Note Type Treatment   Pain Assessment   Pain Assessment Tool Pain Assessment not indicated - pt denies pain   Restrictions/Precautions   Other Precautions Fall Risk;Contact/isolation; Airborne/isolation;Cognitive; Bed Alarm; Chair Alarm   General   Chart Reviewed Yes   Subjective   Subjective "I'm having a panic attack and I have to go to the bathroom" RN made aware of pt's comment re: panic attack  Bed Mobility   Supine to Sit 2  Maximal assistance   Additional items Assist x 1;Verbal cues   Transfers   Sit to Stand 2  Maximal assistance   Additional items Assist x 1;Verbal cues   Stand to Sit 2  Maximal assistance   Additional items Assist x 1;Verbal cues   Stand pivot 2  Maximal assistance   Additional items Assist x 1;Verbal cues   Additional Comments Pt SPT bed to commode for BM;pt did not have a BM;SPT commode to chair   Balance   Static Sitting Fair   Static Standing Poor   Dynamic Standing Poor   Activity Tolerance   Activity Tolerance Patient limited by fatigue;Treatment limited secondary to medical complications (Comment)  (impaired cognition)   Assessment   Assessment Pt trans to EOB with max assist and pt sat on EOB increased time working on sitting balance;pt then trans to commode and spent increased time on commode feeling like she needed to move her bowels, but did not;pt the SPT to chair and sat OOB in chair with all needs in reach;RN aware and (+) chair alarm  Pt with good tolerance to multiple SPT and sitting balance  The patient's AM-PAC Basic Mobility Inpatient Short Form Raw Score is 6, Standardized Score is 30 25  A standardized score less than 42 9 suggests the patient may benefit from discharge to post-acute rehabilitation services  Please also refer to the recommendation of the Physical Therapist for safe discharge planning       Plan   Treatment/Interventions ADL retraining;Functional transfer training;LE strengthening/ROM; Therapeutic exercise; Endurance training;Cognitive reorientation;Patient/family training;Equipment eval/education; Bed mobility;Gait training; Compensatory technique education   PT Frequency 5x/wk   Recommendation   PT Discharge Recommendation Post-Acute Rehabilitation Services   AM-PAC Basic Mobility Inpatient   Turning in Bed Without Bedrails 1   Lying on Back to Sitting on Edge of Flat Bed 1   Moving Bed to Chair 1   Standing Up From Chair 1   Walk in Room 1   Climb 3-5 Stairs 1   Basic Mobility Inpatient Raw Score 6   Turning Head Towards Sound 3   Follow Simple Instructions 3   Low Function Basic Mobility Raw Score 12   Low Function Basic Mobility Standardized Score 13 30   Licensure   [place] License Number  [employer] [place], [place] [id number]

## 2021-02-15 NOTE — CASE MANAGEMENT
LOS - 11 days    Bundle-AMI    SW following to monitor needs and assist with DCP  STR placement is planned  SW spoke with Destini Starr at [address]  Level II PASRR process is ongoing and placement is PA is pending completion of process  Currently [facility] [place] [facility] [place] [facility] [place]  SW reviewed plans with pt and son, Nataliia Rosen  Neither are familiar with accepting facility or area where it is located  Explained that at this time it is the only accepting facility  IMM reviewed with patient  Notified Dr Jim Lockett of above  Discharge is anticipated tomorrow  SW will follow to assist with transfer when ordered

## 2021-02-16 PROBLEM — J96.01 ACUTE HYPOXEMIC RESPIRATORY FAILURE (HCC): Status: ACTIVE | Noted: 2021-01-01

## 2021-02-16 NOTE — CONSULTS
Consultation - Pulmonary Medicine  Haydee Monterey 72 y o  female MRN: [id number]  Unit/Bed#: [address] [place] [address] Encounter: [id number]  Code Status: Level 3 - DNAR and DNI    Assessment/Plan:  Acute hypoxemic respiratory failure ([address] )  Assessment & Plan  Acutely worsened hypoxemia  Pt was on 15 L NC in room today, albeit 100% SP02  Tolerated down titration to 8 L overall   Remained at 95%   Likely COVID related  R/O PE given prolonged hospitalization and acutely worsening hypoxemia and elevated DDimer    Goals of care, counseling/discussion  Assessment & Plan  Patient would like to continue medical therapy but not have advanced interventions / therapies    COVID-19 virus infection  Assessment & Plan  Tested + 2/8  Completed 5 day course on 2/13 of Remdesivir  Day # 6 of Scheduled Dexamethasone (refused 1 dose yesterday) Day 5 of effective steroids today       Lab Results   Component Value Date    SARSCOV2 Positive (A) 02/08/2021    SARSCOV2 Negative 02/01/2021    SARSCOV2 Negative 09/24/2020     Results from last 7 days   Lab Units 02/16/21  0610 02/15/21  0503 02/14/21  0524 02/13/21  0554 02/12/21  0611 02/11/21  0131 02/09/21  1559 02/09/21  1401   D-DIMER QUANTITATIVE ug/ml FEU 1 03*  --  0 98*  --   --  1 20* 1 14*  --    CRP mg/L  --  79 0* 92 9* 154 3* 229 1* 85 4*  --  109 7*   FERRITIN ng/mL  --  423* 525* 687* 503* 355 172  --                FTT (failure to thrive) in adult  Assessment & Plan  FTT   Weak and deconditioned  Malnourished and Cachectic  Poor overall general condition / stamina        Thank you for this consultation; we will be happy to follow with you     ______________________________________________________________________      History of Present Illness   Physician Requesting Consult: Sydney Baker DO  Reason for Consult / Principal Problem:  Hypoxemia  HX and PE limited by:     HPI:  Haydee Denis is a 72 y o  female  has a past medical history of Anxiety, Disease of thyroid gland, History of OCD (obsessive compulsive disorder), Hypertension, and 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[address] )  who presents with Chief Complaint of : "I'm so tired  I don't want to do this anymore  "    73 y/o F w/ PMHX [place] then w/ Hypopotuitarism s/p w/ adenomal resection 1981 w/ chronic hypothyroid and hydrocortisone dependence / PTSD / Anorexia COVID 19  Adm to [facility] 2/1 / [facility] for both COVID 19 w/ significant HYPONATREMIA [place] 120 improved to 130s prior to transfer to [facility] [place] [facility]  She did develop new onset cardiomyopathy while in inpatient setting w/ EF 20-30% and underwent NMST and fixed large perfusion defects  Has T2 NSTEMI but overall determined to have stress cardiomyopathy  Was hypotense and placed on midodrine  Has been in inpatient setting for a long period of time  Now HD # 12    Now post treatment for COVID w/ remdesivir  She remains on steroid therapy but in contemplation of not receiving further medication therapy  Refused decadron yesterday  She has worsening hypoxemia acutely over last 24 hours  Pulmonary consulted in regard to her worsening hypoxemia  Smoking history:  Remote smoker /   Occupational history: various "odd jobs"  Environmental History: no significant occupational exposures   Travel history: no recent travel   Respiratory History: no Hx asthma / COPD   Oxygen Therapy: no prior 02 therapy   PAP Therapy: no prior PAP therapy   DME Company: deferred   Rx Insurance: deferred   PFT: NA    PSG: NA    Labs Reviewed: yes   Imaging Reviewed: yes / CTA PE study ordered   Echo Reviewed: yes / NMST reviewed   Consults Reviewed: cardiology and nephrology consults reviewed   Collaborative Discussion: discussed case w/ Dr Bell Daly of  team     Review of Systems   Constitutional: Positive for fatigue  Negative for activity change, appetite change, chills and fever  HENT: Negative for postnasal drip, rhinorrhea, sinus pressure and sinus pain      Eyes: Negative for visual disturbance  Respiratory: Positive for shortness of breath  Negative for apnea, cough, chest tightness, wheezing and stridor  Cardiovascular: Negative for chest pain and leg swelling  Gastrointestinal: Negative for diarrhea, nausea and vomiting  Endocrine: Negative for cold intolerance and heat intolerance  Musculoskeletal: Negative for arthralgias, back pain, joint swelling and myalgias  Skin: Negative for color change  Neurological: Negative for syncope and light-headedness  Hematological: Negative for adenopathy  Psychiatric/Behavioral: Negative for confusion and sleep disturbance  Past Medical/Surgical History  Past Medical History:   Diagnosis Date    Anxiety     Disease of thyroid gland     History of OCD (obsessive compulsive disorder)     Hypertension     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[address] )      Past Surgical History:   Procedure Laterality Date    ABDOMINAL SURGERY      BRAIN SURGERY      HYSTERECTOMY         Social History  Social History     Substance and Sexual Activity   Alcohol Use Not Currently    Frequency: Never     Social History     Substance and Sexual Activity   Drug Use Never     Social History     Tobacco Use   Smoking Status Former Smoker    Packs/day: 0 25    Years: 20 00    Pack years: 5 00    Types: Cigarettes   Smokeless Tobacco Never Used   Tobacco Comment    pt reports smoking 1 cigarette a day in the summer       Family History  History reviewed  No pertinent family history      Allergies  Allergies   Allergen Reactions    Codeine     Penicillins     Sulfa Antibiotics        Home Meds:   Medications Prior to Admission   Medication Sig Dispense Refill Last Dose    aspirin 81 mg chewable tablet Chew 81 mg daily   2/4/2021 at Unknown time    clonazePAM (KlonoPIN) 0 5 mg tablet Take 0 5 mg by mouth 2 (two) times a day   2/4/2021 at Unknown time    cloNIDine (CATAPRES) 0 1 mg tablet Take 0 5 tablets (0 05 mg total) by mouth 2 (two) times a day 30 tablet 0 2/4/2021 at Unknown time    hydrocortisone (CORTEF) 5 mg tablet Take 1 tablet (5 mg total) by mouth 2 (two) times a day 60 tablet 0 2/4/2021 at Unknown time    levothyroxine 25 mcg tablet Take 1 tablet (25 mcg total) by mouth daily in the early morning 30 tablet 0 2/4/2021 at Unknown time    traZODone (DESYREL) 50 mg tablet Take 1 tablet (50 mg total) by mouth daily at bedtime 30 tablet 0 2/3/2021 at Unknown time    ARIPiprazole (ABILIFY) 15 mg tablet Take 0 5 tablets (7 5 mg total) by mouth 2 (two) times a day 30 tablet 0     ferrous sulfate 325 (65 Fe) mg tablet Take 1 tablet (325 mg total) by mouth every other day (Patient not taking: Reported on 2/4/2021) 15 tablet 0 Not Taking at Unknown time     Current Meds:   Scheduled Meds:  Current Facility-Administered Medications   Medication Dose Route Frequency Provider Last Rate    acetaminophen  650 mg Oral Q6H PRN Jacquelyn Quinteros PA-C      ARIPiprazole  15 mg Oral BID Jacquelyn Quinteros PA-C      ascorbic acid  1,000 mg Oral Q12H [address] Virginia Revjenniferar, DO      aspirin  81 mg Oral Daily Jacquelyn Quinteros PA-C      cholecalciferol  2,000 Units Oral Daily Virginia Revjenniferar, DO      clonazePAM  0 5 mg Oral BID Jacquelyn Quinteros PA-C      dexamethasone  6 mg Intravenous Q24H Virginia Revjenniferar, DO      enoxaparin  30 mg Subcutaneous Q24H [address] Jacquelyn Quinteros PA-C      ferrous sulfate  325 mg Oral Daily With Breakfast Jacquelyn Quinteros PA-C      hydrocortisone sodium succinate  10 mg Intravenous Daily Jacquelyn Quinteros PA-C      hydrocortisone sodium succinate  15 mg Intravenous Early Morning Jacquelyn Quinteros PA-C      levothyroxine  50 mcg Oral Early Morning Jacquelyn Quinteros PA-C      metoprolol succinate  25 mg Oral HS Nieves Scrape, CRNP      midodrine  5 mg Oral TID AC Nieves Scrape, CRNP      zinc sulfate  220 mg Oral Daily Virginia Dupree DO      Followed by   Deneen Hylton ON 2/17/2021] multivitamin-minerals  1 tablet Oral Daily Virginia Dupree, DO      ondansetron  4 mg Intravenous Q6H PRN Gabriella Mcmullen PA-C      polyethylene glycol  17 g Oral Daily Gabriella Mcmullen PA-C      potassium chloride  20 mEq Oral Daily Virginia Revankar, DO      potassium-sodium phosphateS  1 tablet Oral TID With Meals Gabriella Mcmullen PA-C      senna-docusate sodium  1 tablet Oral HS NANI Camargo      thiamine  100 mg Oral Daily Gabriella Mcmullen PA-C      traZODone  50 mg Oral HS Gabriella Mcmullen PA-C       PRN Meds:acetaminophen, 650 mg, Q6H PRN  ondansetron, 4 mg, Q6H PRN        ____________________________________________________________________    Objective   Vitals:   Temp:  [97 5 °F (36 4 °C)-100 8 °F (38 2 °C)] 97 5 °F (36 4 °C)  HR:  [] 79  Resp:  [20-22] 20  BP: (100-118)/(59-80) 100/59  Weight (last 2 days)     Date/Time   Weight    21 0600   35 3 (77 82)    02/15/21 0549   35 2 (77 6)    02/15/21 0510   35 2 (77 6)            Vitals:    21 0600 21 0608 21 0803 21 0833   BP:   100/59    BP Location:   Right arm    Pulse:   79    Resp:   20    Temp:   97 5 °F (36 4 °C)    TempSrc:   Oral    SpO2:  91% 100% 92%   Weight: 35 3 kg (77 lb 13 2 oz)      Height:         Temp (24hrs), Av 6 °F (37 6 °C), Min:97 5 °F (36 4 °C), Max:100 8 °F (38 2 °C)  Current: Temperature: 97 5 °F (36 4 °C)        SpO2: SpO2: 100 %, SpO2 Activity: SpO2 Activity: At Rest, SpO2 Device: O2 Device: Mid flow nasal cannula      IV Infusions:        Nutrition:        Diet Orders   (From admission, onward)             Start     Ordered    02/10/21 1031  Diet Regular; Regular House  Diet effective now     Question Answer Comment   Diet Type Regular    Regular Regular House    Special Instructions Disposable Meal    RD to adjust diet per protocol?  Yes        02/10/21 1030    21 1258  Room Service  Once     Question:  Type of Service  Answer:  Room Service-Appropriate    21 1258    21 0152  Dietary nutrition supplements  Once     Question Answer Comment   Select Supplement: Ensure Pudding-Chocolate    Frequency Breakfast, Lunch, Dinner        02/04/21 0151                  Ins/Outs:   I/O       02/14 0701 - 02/15 0700 02/15 0701 - 02/16 0700 02/16 0701 - 02/17 0700    P  O  200      Total Intake(mL/kg) 200 (5 7)      Urine (mL/kg/hr) 850 (1) 1100 (1 3)     Total Output 850 1100     Net -650 -1100            Unmeasured Urine Occurrence 2 x              Lines/Drains:  Invasive Devices     Peripheral Intravenous Line            Peripheral IV 02/15/21 Left; Lower Arm less than 1 day          Drain            External Urinary Catheter 7 days                ____________________________________________________________________      Physical Exam  Constitutional:       Appearance: She is well-developed  She is cachectic  Comments: weak   HENT:      Head: Normocephalic and atraumatic  Eyes:      Conjunctiva/sclera: Conjunctivae normal       Pupils: Pupils are equal, round, and reactive to light  Neck:      Musculoskeletal: Normal range of motion and neck supple  Cardiovascular:      Rate and Rhythm: Normal rate and regular rhythm  Heart sounds: Normal heart sounds  No murmur  No friction rub  Pulmonary:      Effort: Pulmonary effort is normal  No respiratory distress  Breath sounds: Normal breath sounds  No stridor  No wheezing or rales  Comments: 100% on 15 L     Titrated to 8 L and remains 95%   Chest:      Chest wall: No tenderness  Abdominal:      General: Bowel sounds are normal       Palpations: Abdomen is soft  Musculoskeletal: Normal range of motion  Right lower leg: No edema  Left lower leg: No edema  Comments: Bony and fail appearing    Bony prominences throughout body , clavicles, temples, arms, legs, etc     Emaciated   Skin:     General: Skin is warm and dry  Neurological:      Mental Status: She is alert and oriented to person, place, and time  ____________________________________________________________________    Invasive/non-invasive ventilation settings   Respiratory    Lab Data (Last 4 hours)    None         O2/Vent Data (Last 4 hours)    None                Laboratory and Diagnostics:  Results from last 7 days   Lab Units 02/16/21  0610 02/15/21  0503 02/14/21  0524 02/13/21  0554 02/12/21  0611 02/11/21  0131   WBC Thousand/uL 6 99 10 73* 7 18 9 26 9 81 8 19   HEMOGLOBIN g/dL 8 3* 8 8* 8 7* 9 8* 9 7* 11 5   HEMATOCRIT % 25 9* 27 9* 26 7* 29 8* 30 0* 37 0   PLATELETS Thousands/uL 295 292 273 263 250 344     Results from last 7 days   Lab Units 02/16/21  0744 02/16/21  0610 02/15/21  0503 02/14/21  0524 02/13/21  0554 02/12/21  0611 02/11/21  0131   SODIUM mmol/L  --  138 140 143 138 138 136   POTASSIUM mmol/L  --  3 8 3 2* 3 7 3 0* 3 2* 3 8   CHLORIDE mmol/L  --  103 102 107 102 101 98*   CO2 mmol/L  --  28 32 32 32 32 33*   ANION GAP mmol/L  --  7 6 4 4 5 5   BUN mg/dL  --  11 10 13 12 14 12   CREATININE mg/dL  --  0 52* 0 49* 0 40* 0 45* 0 50* 0 62   CALCIUM mg/dL  --  7 3* 8 2* 7 8* 7 9* 7 8* 8 0*   GLUCOSE RANDOM mg/dL 24* 30* 62* 77 96 86 82   ALT U/L  --  19 22 17 17 17 22   AST U/L  --  35 40 31 32 37 32   ALK PHOS U/L  --  53 51 44* 46 41* 55   ALBUMIN g/dL  --  2 1* 2 5* 2 1* 2 3* 2 3* 2 3*   TOTAL BILIRUBIN mg/dL  --  0 90 0 60 0 40 0 40 0 30 0 30     Results from last 7 days   Lab Units 02/16/21  0610 02/14/21  0524   MAGNESIUM mg/dL 1 9 2 1                   ABG:    VBG:          Micro        Imaging:   XR chest portable   Final Result by Paul Dasilva MD (02/16 9805)      No acute cardiopulmonary disease  Workstation performed: [id number]         CT head wo contrast   Final Result by Meghann Wilson MD (02/13 3418)      No acute intracranial hemorrhage, midline shift, or mass effect                    Workstation performed: [id number]               Micro:   Lab Results   Component Value Date    BLOODCX No Growth After 5 Days  02/01/2021    BLOODCX No Growth After 5 Days   02/01/2021        ____________________________________________________________________

## 2021-02-16 NOTE — PHYSICAL THERAPY NOTE
02/16/21 1330   PT Last Visit   PT Visit Date 02/16/21   Note Type   Note Type Treatment   Cancel Reasons Other  (pt refused)   Licensure   [place] License Number  [address] [place] [address] [id number]

## 2021-02-16 NOTE — ASSESSMENT & PLAN NOTE
Tested + 2/8  Completed 5 day course on 2/13 of Remdesivir  Day # 7 of steroids    Lab Results   Component Value Date    SARSCOV2 Positive (A) 02/08/2021    SARSCOV2 Negative 02/01/2021    SARSCOV2 Negative 09/24/2020     Results from last 7 days   Lab Units 02/18/21  0537 02/17/21  0500 02/16/21  1737 02/16/21  0610 02/15/21  0503 02/14/21  0524 02/13/21  0554 02/12/21  9119   D-DIMER QUANTITATIVE ug/ml FEU  --   --   --  1 03*  --  0 98*  --   --    CRP mg/L 152 6* 248 5* 207 8*  --  79 0* 92 9* 154 3* 229 1*   FERRITIN ng/mL  --  507* 453*  --  423* 525* 687* 503*

## 2021-02-16 NOTE — PROGRESS NOTES
[facility] Internal Medicine Progress Note  Patient: Vikki Johnson 72 y o  female   MRN: [id number]  PCP: Connor Diamond DO  Unit/Bed#: [address] [place] [address] Encounter: [id number]  Date Of Visit: 02/15/21    Problem List:    Principal Problem:    Elevated troponin  Active Problems:    Sinus tachycardia    COVID-19 virus infection    Goals of care, counseling/discussion    FTT (failure to thrive) in adult    Severe protein-calorie malnutrition (HCC)    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HCC)    Thyroid disorder    Hypotension    Generalized weakness      Assessment & Plan:    * Elevated troponin  Assessment & Plan  Patient presented to the ED at [place] with an elevated troponin peaked at 3 6  Likely non MI troponin elevation in the setting of stress cardiomyopathy- patient underwent echo which showed an EF of 30%  Cardiology consult appreciated  Patient underwent nuclear stress test  which was markedly abnormal with mild diffuse decrease in uptake in apical and mid walls involving all the walls  LVEF was severely decreased with apical ballooning  Continue medical management as per Cardiology        Sinus tachycardia  Assessment & Plan  Patient was loaded with digoxin and started on digoxin 125 micrograms p o  Daily which has been discontinued  Patient on Toprol-XL 25 milligram p o  Daily at bedtime along with midodrine 5 milligram p o  T i d    heart rate has improved overall compared to before  Per Cardiology, patient with history of sinus tachycardia over the last 2-3 years  Patient's orthostatic vital signs were negative  Goals of care, counseling/discussion  Assessment & Plan  Had discussed with patient's son Tianna Bhagat and discussed patient's multiple comorbidities including cardiac issues, failure to thrive, COVID infection etc   Patient's mental status also seems to wax and wane  Spoke with him that her overall prognosis is poor and unclear how much she will recover from this if any    Patient also with decreased p o  Intake and at times refuses treatments  Son stated that he does not want any aggressive measures and that the patient has been declining even at baseline  Will continue current medical management  Repeat CTA head was done as patient appeared confused previously and it was negative for acute pathology    COVID-19 virus infection  Assessment & Plan  Patient tested positive for COVID, Mild disease  · Per RN, patient noted to be hypoxic and was placed on oxygen but currently stable on room air  · As patient was requiring oxygen, she was started on remdesivir and Decadron  This was discussed with patient and family  Patient refusing Decadron  · Check and trend inflammatory markers, CRP, ferritin  · Patient was educated and encouraged self proing  · Increase activity and mobilization as tolerated  Results from last 7 days   Lab Units 02/15/21  0503 02/14/21  0524 02/13/21  0554 02/12/21  0611 02/11/21  0131 02/09/21  1559 02/09/21  1401   CRP mg/L 79 0* 92 9* 154 3* 229 1* 85 4*  --  109 7*   FERRITIN ng/mL 423* 525* 687* 503* 355 172  --    D-DIMER QUANTITATIVE ug/ml FEU  --  0 98*  --   --  1 20* 1 14*  --                  Generalized weakness  Assessment & Plan  Generalized weakness - multifactorial, hyponatremia, steroid, thyroid medication adjustments  Currently generalized weakness is most likely 2/2 failure to thrive, severe protein calorie malnutrition, COVID infection  - PT OT  Patient to be discharged to short-term rehabilitation    Hypotension  Assessment & Plan  Patient's home medications were on hold due to borderline low blood pressure  Patient was started on low-dose beta-blocker due to sinus tachycardia  midodrine 5 milligram p o  T i d  was added due to low BP  Thyroid disorder  Assessment & Plan  Levothyroxine dose increased to 50 mcg daily  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HCC)  Assessment & Plan  Continue IV hydrocortisone - 15 mg in the morning,10 mg at 4:00 p m  on d/c  -appreciate Endocrinology recommendations    Severe protein-calorie malnutrition ([address] )  Assessment & Plan  Malnutrition Findings:   Adult Malnutrition type: Chronic illness(Severe protein calorie malnutrition due to inadequate energy intake as evidenced by hollow orbits, depressed temples and protruding clavicles  Treated with Regular diet and supplements)   Adult Degree of Malnutrition: Other severe protein calorie malnutrition  BMI Findings:  Adult BMI Classifications: Underweight < 18 5  Body mass index is 16 22 kg/m²  Nutrition consult appreciated   Continue diet with nutritional supplementation    FTT (failure to thrive) in adult  Assessment & Plan  Patient has a history of anorexia nervosa  Continue thiamine, phosphate supplements  PT/OT  Patient will be discharged to short-term rehabilitation  Constipation-resolved as of 2/11/2021  Assessment & Plan  Patient did not have a bowel movement for 1 week  Continue MiraLax 17 grams p o  Daily  Patient had a large brown bowel movement with enema on 02/05    Hyponatremia-resolved as of 2/10/2021  Assessment & Plan  Multifactorial 2/2 poor oral intake, adrenal insufficiency, SIADH  - sodium was 120 on admission and has since improved  - IVF discontinued    Results from last 7 days   Lab Units 02/15/21  0503 02/14/21  0524 02/13/21  0554 02/12/21  0611 02/11/21  0131 02/09/21  0607   SODIUM mmol/L 140 143 138 138 136 139         VTE Pharmacologic Prophylaxis:   Pharmacologic: Enoxaparin (Lovenox)  Mechanical VTE Prophylaxis in Place: Yes    Patient Centered Rounds: I have performed bedside rounds with nursing staff today  Discussions with Specialists or Other Care Team Provider: yes    Education and Discussions with Family / Patient: yes - Dipesh Tracy    Time Spent for Care: 30 min  More than 50% of total time spent on counseling and coordination of care as described above      Current Length of Stay: 11 day(s)    Current Patient Status: Inpatient   Certification Statement: The patient will continue to require additional inpatient hospital stay due to COVID infection    Discharge Plan: STR    Code Status: Level 3 - DNAR and DNI      Subjective:     Patient denies any shortness of breath  Does not want any steroids    Objective:     Vitals:   Temp (24hrs), Av 8 °F (37 1 °C), Min:97 8 °F (36 6 °C), Max:100 °F (37 8 °C)    Temp:  [97 8 °F (36 6 °C)-100 °F (37 8 °C)] 100 °F (37 8 °C)  HR:  [80-94] 90  Resp:  [20-21] 20  BP: (118-138)/(65-79) 118/65  SpO2:  [91 %-98 %] 92 %  Body mass index is 16 22 kg/m²  Input and Output Summary (last 24 hours): Intake/Output Summary (Last 24 hours) at 2/15/2021 1921  Last data filed at 2/15/2021 6361  Gross per 24 hour   Intake --   Output 300 ml   Net -300 ml       Physical Exam:     Physical Exam  Constitutional:       General: She is not in acute distress  Appearance: She is ill-appearing  She is not diaphoretic  Comments: cachectic   HENT:      Head: Normocephalic and atraumatic  Eyes:      General: No scleral icterus  Right eye: No discharge  Left eye: No discharge  Cardiovascular:      Rate and Rhythm: Normal rate and regular rhythm  Pulmonary:      Effort: Pulmonary effort is normal  No respiratory distress  Breath sounds: Normal breath sounds  No wheezing or rales  Abdominal:      General: Bowel sounds are normal  There is no distension  Palpations: Abdomen is soft  Tenderness: There is no abdominal tenderness  Neurological:      Mental Status: She is alert           Additional Data:     Labs:    Results from last 7 days   Lab Units 02/15/21  0503  21  0607   WBC Thousand/uL 10 73*   < > 6 35   HEMOGLOBIN g/dL 8 8*   < > 9 1*   HEMATOCRIT % 27 9*   < > 28 3*   PLATELETS Thousands/uL 292   < > 265   NEUTROS PCT %  --   --  87*   LYMPHS PCT %  --   --  8*   MONOS PCT %  --   --  4   EOS PCT %  --   --  0    < > = values in this interval not displayed  Results from last 7 days   Lab Units 02/15/21  0503   POTASSIUM mmol/L 3 2*   CHLORIDE mmol/L 102   CO2 mmol/L 32   BUN mg/dL 10   CREATININE mg/dL 0 49*   CALCIUM mg/dL 8 2*   ALK PHOS U/L 51   ALT U/L 22   AST U/L 40           * I Have Reviewed All Lab Data Listed Above  * Additional Pertinent Lab Tests Reviewed:  Mis 66 Admission Reviewed      Imaging:  Imaging Reports Reviewed Today Include: CXR  Imaging Personally Reviewed by Myself Includes:  N/A    Recent Cultures (last 7 days):           Last 24 Hours Medication List:   Current Facility-Administered Medications   Medication Dose Route Frequency Provider Last Rate    acetaminophen  650 mg Oral Q6H PRN Paul Fuel, PA-C      ARIPiprazole  15 mg Oral BID Paul Fuel, PA-C      ascorbic acid  1,000 mg Oral Q12H [place] 1960, DO      aspirin  81 mg Oral Daily Paul Fuel, PA-C      cholecalciferol  2,000 Units Oral Daily Virginia Revankar, DO      clonazePAM  0 5 mg Oral BID Paul Fuel, PA-C      dexamethasone  6 mg Intravenous Q24H Virginia Revankar, DO      enoxaparin  30 mg Subcutaneous Q24H [facility] Paul Fuel, PA-C      ferrous sulfate  325 mg Oral Daily With Breakfast Paul Fuel, PA-C      hydrocortisone sodium succinate  10 mg Intravenous Daily Paul Fuel, PA-C      hydrocortisone sodium succinate  15 mg Intravenous Early Morning Paul Fuel, PA-C      levothyroxine  50 mcg Oral Early Morning Paul Fuel, PA-C      metoprolol succinate  25 mg Oral HS Lemmie Regulus, CRNP      midodrine  5 mg Oral TID AC Lemmie Regulus, CRNP      zinc sulfate  220 mg Oral Daily Virginia Revankar, DO      Followed by   Rosaura Bui ON 2/17/2021] multivitamin-minerals  1 tablet Oral Daily Virginia Revankar, DO      ondansetron  4 mg Intravenous Q6H PRN Paul Fuel, PA-C      polyethylene glycol  17 g Oral Daily Paul Fuel, PA-C      [START ON 2/16/2021] potassium chloride  20 mEq Oral Daily Virginia Revankar, DO      potassium chloride  20 mEq Oral Once Virginia Revankar, DO      potassium-sodium phosphateS  1 tablet Oral TID With Meals Boris Mcdaniel PA-C      senna-docusate sodium  1 tablet Oral HS NANI Newton      thiamine  100 mg Oral Daily Boris Mcdaniel PA-C      traZODone  50 mg Oral HS Boris Mcdaniel PA-C            Today, Patient Was Seen By: Anne Munoz DO    ** Please Note: "This note has been constructed using a voice recognition system  Therefore there may be syntax, spelling, and/or grammatical errors   Please call if you have any questions  "**

## 2021-02-16 NOTE — ASSESSMENT & PLAN NOTE
Patient would like to continue medical therapy but not have advanced interventions / therapies  Discussed both yesterday and again this morning with son Hernesto Stewartte  He agrees with comfort level of care and understands her prognosis is dismal and will result in her demise  He agrees to change to comfort care for her  Conversation discussed between myself, Dr Juan Trinh, and w/ Son Hernestotaqueria Myles

## 2021-02-16 NOTE — PLAN OF CARE
Problem: Potential for Falls  Goal: Patient will remain free of falls  Description: INTERVENTIONS:  - Assess patient frequently for physical needs  -  Identify cognitive and physical deficits and behaviors that affect risk of falls    -  [place] fall precautions as indicated by assessment   - Educate patient/family on patient safety including physical limitations  - Instruct patient to call for assistance with activity based on assessment  - Modify environment to reduce risk of injury  - Consider OT/PT consult to assist with strengthening/mobility  Outcome: Progressing     Problem: Prexisting or High Potential for Compromised Skin Integrity  Goal: Skin integrity is maintained or improved  Description: INTERVENTIONS:  - Identify patients at risk for skin breakdown  - Assess and monitor skin integrity  - Assess and monitor nutrition and hydration status  - Monitor labs   - Assess for incontinence   - Turn and reposition patient  - Assist with mobility/ambulation  - Relieve pressure over bony prominences  - Avoid friction and shearing  - Provide appropriate hygiene as needed including keeping skin clean and dry  - Evaluate need for skin moisturizer/barrier cream  - Collaborate with interdisciplinary team   - Patient/family teaching  - Consider wound care consult   Outcome: Progressing     Problem: PAIN - ADULT  Goal: Verbalizes/displays adequate comfort level or baseline comfort level  Description: Interventions:  - Encourage patient to monitor pain and request assistance  - Assess pain using appropriate pain scale  - Administer analgesics based on type and severity of pain and evaluate response  - Implement non-pharmacological measures as appropriate and evaluate response  - Consider cultural and social influences on pain and pain management  - Notify physician/advanced practitioner if interventions unsuccessful or patient reports new pain  Outcome: Progressing     Problem: INFECTION - ADULT  Goal: Absence or prevention of progression during hospitalization  Description: INTERVENTIONS:  - Assess and monitor for signs and symptoms of infection  - Monitor lab/diagnostic results  - Monitor all insertion sites, i e  indwelling lines, tubes, and drains  - [place] appropriate cooling/warming therapies per order  - Administer medications as ordered  - Instruct and encourage patient and family to use good hand hygiene technique  - Identify and instruct in appropriate isolation precautions for identified infection/condition  Outcome: Progressing     Problem: SAFETY ADULT  Goal: Patient will remain free of falls  Description: INTERVENTIONS:  - Assess patient frequently for physical needs  -  Identify cognitive and physical deficits and behaviors that affect risk of falls    -  [place] fall precautions as indicated by assessment   - Educate patient/family on patient safety including physical limitations  - Instruct patient to call for assistance with activity based on assessment  - Modify environment to reduce risk of injury  - Consider OT/PT consult to assist with strengthening/mobility  Outcome: Progressing  Goal: Maintain or return to baseline ADL function  Description: INTERVENTIONS:  -  Assess patient's ability to carry out ADLs; assess patient's baseline for ADL function and identify physical deficits which impact ability to perform ADLs (bathing, care of mouth/teeth, toileting, grooming, dressing, etc )  - Assess/evaluate cause of self-care deficits   - Assess range of motion  - Assess patient's mobility; develop plan if impaired  - Assess patient's need for assistive devices and provide as appropriate  - Encourage maximum independence but intervene and supervise when necessary  - Involve family in performance of ADLs  - Assess for home care needs following discharge   - Consider OT consult to assist with ADL evaluation and planning for discharge  - Provide patient education as appropriate  Outcome: Progressing  Goal: Maintain or return mobility status to optimal level  Description: INTERVENTIONS:  - Assess patient's baseline mobility status (ambulation, transfers, stairs, etc )    - Identify cognitive and physical deficits and behaviors that affect mobility  - Identify mobility aids required to assist with transfers and/or ambulation (gait belt, sit-to-stand, lift, walker, cane, etc )  - [place] fall precautions as indicated by assessment  - Record patient progress and toleration of activity level on Mobility SBAR; progress patient to next Phase/Stage  - Instruct patient to call for assistance with activity based on assessment  - Consider rehabilitation consult to assist with strengthening/weightbearing, etc   Outcome: Progressing     Problem: DISCHARGE PLANNING  Goal: Discharge to home or other facility with appropriate resources  Description: INTERVENTIONS:  - Identify barriers to discharge w/patient and caregiver  - Arrange for needed discharge resources and transportation as appropriate  - Identify discharge learning needs (meds, wound care, etc )  - Arrange for interpretive services to assist at discharge as needed  - Refer to Case Management Department for coordinating discharge planning if the patient needs post-hospital services based on physician/advanced practitioner order or complex needs related to functional status, cognitive ability, or social support system  Outcome: Progressing     Problem: Knowledge Deficit  Goal: Patient/family/caregiver demonstrates understanding of disease process, treatment plan, medications, and discharge instructions  Description: Complete learning assessment and assess knowledge base    Interventions:  - Provide teaching at level of understanding  - Provide teaching via preferred learning methods  Outcome: Progressing     Problem: CARDIOVASCULAR - ADULT  Goal: Maintains optimal cardiac output and hemodynamic stability  Description: INTERVENTIONS:  - Monitor I/O, vital signs and rhythm  - Monitor for S/S and trends of decreased cardiac output  - Administer and titrate ordered vasoactive medications to optimize hemodynamic stability  - Assess quality of pulses, skin color and temperature  - Assess for signs of decreased coronary artery perfusion  - Instruct patient to report change in severity of symptoms  Outcome: Progressing  Goal: Absence of cardiac dysrhythmias or at baseline rhythm  Description: INTERVENTIONS:  - Continuous cardiac monitoring, vital signs, obtain 12 lead EKG if ordered  - Administer antiarrhythmic and heart rate control medications as ordered  - Monitor electrolytes and administer replacement therapy as ordered  Outcome: Progressing     Problem: Nutrition/Hydration-ADULT  Goal: Nutrient/Hydration intake appropriate for improving, restoring or maintaining nutritional needs  Description: Monitor and assess patient's nutrition/hydration status for malnutrition  Collaborate with interdisciplinary team and initiate plan and interventions as ordered  Monitor patient's weight and dietary intake as ordered or per policy  Utilize nutrition screening tool and intervene as necessary  Determine patient's food preferences and provide high-protein, high-caloric foods as appropriate       INTERVENTIONS:  - Monitor oral intake, urinary output, labs, and treatment plans  - Assess nutrition and hydration status and recommend course of action  - Evaluate amount of meals eaten  - Assist patient with eating if necessary   - Allow adequate time for meals  - Recommend/ encourage appropriate diets, oral nutritional supplements, and vitamin/mineral supplements  - Order, calculate, and assess calorie counts as needed  - Assess need for intravenous fluids  - Provide specific nutrition/hydration education as appropriate  - Include patient/family/caregiver in decisions related to nutrition  Outcome: Progressing

## 2021-02-16 NOTE — ASSESSMENT & PLAN NOTE
Again with significantly worsened hypoxemia  Now on HFNC 100% 02   Prior desaturations to 70s% range > slowly improved to 95% on 100 % FIO2   COVID related  Nursing was unable to get viable venous access large enough to get CTA  Unable to obtain CTA, however, this is very unlikely to change outcome regardless given her severe FTT / COVID 19/ sever malnourishment

## 2021-02-16 NOTE — ASSESSMENT & PLAN NOTE
FTT   Weak and deconditioned  Malnourished and Cachectic  Poor overall general condition / stamina  Worsening clinical status

## 2021-02-16 NOTE — CASE MANAGEMENT
LOS - 12 days    SW following to assist with DCP  Skilled rehab facility placement planned once medically stable  Case discussed with Dr Tod Quigley  Pt's condition declined overnight  Pt not ready for discharge at this time  Notice received from [address] [facility] that Level II PASRR process was completed and pt has been approved for skilled nursing level of care  Approval letter emailed to SW and SW sent letter to PA referral facilities  At this time all the PA facilities are not accepting pt despite Level II PASRR approval   There remains availability at [place] [facility] will continue to follow to monitor progress and assist with planning as needed

## 2021-02-16 NOTE — OCCUPATIONAL THERAPY NOTE
OT TREATMENT       02/16/21 1551   Note Type   Note Type Treatment   Cancel Reasons Medical status  (R/O PE)   Deb Mix MS OTR/L [id number]

## 2021-02-16 NOTE — PROGRESS NOTES
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[facility] )    Thyroid disorder    Hypotension    Generalized weakness    Acute hypoxemic respiratory failure ([facility] )      Assessment & Plan:    * Elevated troponin  Assessment & Plan  Patient presented to the ED at [place] with an elevated troponin peaked at 3 6  Likely non MI troponin elevation in the setting of stress cardiomyopathy- patient underwent echo which showed an EF of 30%  Patient underwent nuclear stress test  which was markedly abnormal with mild diffuse decrease in uptake in apical and mid walls involving all the walls  LVEF was severely decreased with apical ballooning  Continue medical management as per Cardiology        Sinus tachycardia  Assessment & Plan  Patient was loaded with digoxin and started on digoxin 125 micrograms p o  Daily which has been discontinued  Patient on Toprol-XL 25 milligram p o  Daily at bedtime along with midodrine 5 milligram p o  T i d    heart rate has improved overall compared to before  Per Cardiology, patient with history of sinus tachycardia over the last 2-3 years  Patient's orthostatic vital signs were negative  Goals of care, counseling/discussion  Assessment & Plan  Had discussed with patient's son Lisa Granger and discussed patient's multiple comorbidities including cardiac issues, failure to thrive, COVID infection etc   Patient's mental status also seems to wax and wane  Spoke with him that her overall prognosis is poor and unclear how much she will recover from this if any    Patient also with decreased p o  Intake and at times refuses treatments  Son stated that he does not want any aggressive measures and that the patient has been declining even at baseline  Discussed with son again due to worsening oxygen requirement and patient being noncompliant with medical treatment here  He understands that she can get progressively worse if she continues to refuse treatment  This was also discussed with patient  Repeat CTA head was done as patient appeared confused previously and it was negative for acute pathology    COVID-19 virus infection  Assessment & Plan  Patient tested positive for COVID  · Per RN, patient noted to be hypoxic and was placed on oxygen and then transitioned to room air  Overnight oxygen requirements has worsened and patient now on 13 L mid flow  · Patient has completed course of remdesivir  Patient refusing Decadron  This was discussed with patient and family  Importance of being compliant with medical treatment was discussed with patient  · Check and trend inflammatory markers, CRP, ferritin  · Patient was educated and encouraged self proing  · Increase activity and mobilization as tolerated  Results from last 7 days   Lab Units 02/16/21  0610 02/15/21  0503 02/14/21  0524 02/13/21  0554 02/12/21  0611 02/11/21  0131 02/09/21  1559 02/09/21  1401   CRP mg/L  --  79 0* 92 9* 154 3* 229 1* 85 4*  --  109 7*   FERRITIN ng/mL  --  423* 525* 687* 503* 355 172  --    D-DIMER QUANTITATIVE ug/ml FEU 1 03*  --  0 98*  --   --  1 20* 1 14*  --                  Generalized weakness  Assessment & Plan  Generalized weakness - multifactorial, hyponatremia, steroid, thyroid medication adjustments  Currently generalized weakness is most likely 2/2 failure to thrive, severe protein calorie malnutrition, COVID infection  - PT OT  Patient to be discharged to short-term rehabilitation      Hypotension  Assessment & Plan  Patient's home medications were on hold due to borderline low blood pressure  Patient was started on low-dose beta-blocker due to sinus tachycardia  midodrine 5 milligram p o  T i d  was added due to low BP    Thyroid disorder  Assessment & Plan  Levothyroxine dose increased to 50 mcg daily    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HCC)  Assessment & Plan  Continue IV hydrocortisone - 15 mg in the morning,10 mg at 4:00 p m  on d/c  -appreciate Endocrinology recommendations  Severe protein-calorie malnutrition ([address] )  Assessment & Plan  Malnutrition Findings:   Adult Malnutrition type: Chronic illness(Severe protein calorie malnutrition due to inadequate energy intake as evidenced by hollow orbits, depressed temples and protruding clavicles  Treated with Regular diet and supplements)   Adult Degree of Malnutrition: Other severe protein calorie malnutrition  BMI Findings:  Adult BMI Classifications: Underweight < 18 5  Body mass index is 16 26 kg/m²  Nutrition consult appreciated  Continue diet with nutritional supplementation    FTT (failure to thrive) in adult  Assessment & Plan  Patient has a history of anorexia nervosa  Continue thiamine, phosphate supplements  PT/OT  Patient will be discharged to short-term rehabilitation  Constipation-resolved as of 2/11/2021  Assessment & Plan  Patient did not have a bowel movement for 1 week  Continue MiraLax 17 grams p o  Daily    Patient had a large brown bowel movement with enema on 02/05    Hyponatremia-resolved as of 2/10/2021  Assessment & Plan  Multifactorial 2/2 poor oral intake, adrenal insufficiency, SIADH  - sodium was 120 on admission and has since improved  - IVF discontinued    Results from last 7 days   Lab Units 02/16/21  0610 02/15/21  0503 02/14/21  0524 02/13/21  0554 02/12/21  0611 02/11/21  0131   SODIUM mmol/L 138 140 143 138 138 136         VTE Pharmacologic Prophylaxis:   Pharmacologic: Enoxaparin (Lovenox)  Mechanical VTE Prophylaxis in Place: Yes    Patient Centered Rounds: I have performed bedside rounds with nursing staff today  Discussions with Specialists or Other Care Team Provider: yes    Education and Discussions with Family / Patient: yes - Whitney Jordan    Time Spent for Care: 35 min  More than 50% of total time spent on counseling and coordination of care as described above  Current Length of Stay: 12 day(s)    Current Patient Status: Inpatient   Certification Statement: The patient will continue to require additional inpatient hospital stay due to COVID infection with worsening oxygen requirement    Discharge Plan: STR    Code Status: Level 3 - DNAR and DNI      Subjective:     Patient states she is not doing so well today in terms of her breathing    Objective:     Vitals:   Temp (24hrs), Av 6 °F (37 6 °C), Min:97 5 °F (36 4 °C), Max:100 8 °F (38 2 °C)    Temp:  [97 5 °F (36 4 °C)-100 8 °F (38 2 °C)] 97 5 °F (36 4 °C)  HR:  [] 79  Resp:  [20-22] 20  BP: (100-118)/(59-80) 100/59  SpO2:  [88 %-100 %] 100 %  Body mass index is 16 26 kg/m²  Input and Output Summary (last 24 hours): Intake/Output Summary (Last 24 hours) at 2021 0967  Last data filed at 2021 0557  Gross per 24 hour   Intake --   Output 1100 ml   Net -1100 ml       Physical Exam:     Physical Exam  Constitutional:       General: She is not in acute distress  Appearance: She is ill-appearing  She is not diaphoretic  Comments: Cachectic   HENT:      Head: Normocephalic and atraumatic  Eyes:      General:         Right eye: No discharge  Left eye: No discharge  Cardiovascular:      Rate and Rhythm: Normal rate and regular rhythm  Pulmonary:      Effort: Pulmonary effort is normal  No respiratory distress  Breath sounds: No wheezing or rales  Comments: Decreased breath sounds bilaterally  Abdominal:      General: Bowel sounds are normal  There is no distension  Palpations: Abdomen is soft  Tenderness: There is no abdominal tenderness     Neurological: Mental Status: She is alert and oriented to person, place, and time  Additional Data:     Labs:    Results from last 7 days   Lab Units 02/16/21  0610   WBC Thousand/uL 6 99   HEMOGLOBIN g/dL 8 3*   HEMATOCRIT % 25 9*   PLATELETS Thousands/uL 295     Results from last 7 days   Lab Units 02/16/21  0610   POTASSIUM mmol/L 3 8   CHLORIDE mmol/L 103   CO2 mmol/L 28   BUN mg/dL 11   CREATININE mg/dL 0 52*   CALCIUM mg/dL 7 3*   ALK PHOS U/L 53   ALT U/L 19   AST U/L 35           * I Have Reviewed All Lab Data Listed Above  * Additional Pertinent Lab Tests Reviewed:  Mis 66 Admission Reviewed      Imaging:  Imaging Reports Reviewed Today Include: CXR  Imaging Personally Reviewed by Myself Includes:  N/A    Recent Cultures (last 7 days):           Last 24 Hours Medication List:   Current Facility-Administered Medications   Medication Dose Route Frequency Provider Last Rate    acetaminophen  650 mg Oral Q6H PRN ALISHA Torres-C      ARIPiprazole  15 mg Oral BID ALISHA Torres-C      ascorbic acid  1,000 mg Oral Q12H [place], DO      aspirin  81 mg Oral Daily ALISHA Torres-C      cholecalciferol  2,000 Units Oral Daily Virginia Revankar, DO      clonazePAM  0 5 mg Oral BID ALISHA Torres-GLENN      dexamethasone  6 mg Intravenous Q24H Virginia Revankar, DO      enoxaparin  30 mg Subcutaneous Q24H [facility] ALISHA Torres-C      ferrous sulfate  325 mg Oral Daily With Breakfast ALISHA Torres-C      hydrocortisone sodium succinate  10 mg Intravenous Daily Susu Echevarria PA-C      hydrocortisone sodium succinate  15 mg Intravenous Early Morning Susu Echevarria PA-C      levothyroxine  50 mcg Oral Early Morning Susu Echevarria PA-C      metoprolol succinate  25 mg Oral HS Dorothe Ours, CRNP      midodrine  5 mg Oral TID AC Dorothe Ours, CRNP      zinc sulfate  220 mg Oral Daily Virginia Revankar, DO      Followed by   Phill Chandler ON 2/17/2021] multivitamin-minerals  1 tablet Oral Daily Virginia Revankar, DO      ondansetron  4 mg Intravenous Q6H PRN Jacquelyn Quinteros PA-C      polyethylene glycol  17 g Oral Daily Jacquelyn Quinteros PA-C      potassium chloride  20 mEq Oral Daily Virginia Revankar, DO      potassium-sodium phosphateS  1 tablet Oral TID With Meals Jacquelyn Quinteros PA-C      senna-docusate sodium  1 tablet Oral HS NANI Butler      thiamine  100 mg Oral Daily Jacquelyn Quinteros PA-C      traZODone  50 mg Oral HS Jacquelyn Quinteros PA-C            Today, Patient Was Seen By: Cata Thompson DO    ** Please Note: "This note has been constructed using a voice recognition system  Therefore there may be syntax, spelling, and/or grammatical errors   Please call if you have any questions  "**

## 2021-02-17 NOTE — OCCUPATIONAL THERAPY NOTE
Occupational Therapy Treatment Note       02/17/21 0935   Note Type   Note Type Treatment   Restrictions/Precautions   Other Precautions Airborne/isolation;Droplet precautions; Chair Alarm; Bed Alarm  (High Flow nasal cannula)   Pain Assessment   Pain Assessment Tool 0-10   Pain Score 5   Pain Location/Orientation Location: Generalized   ADL   Eating Assistance 2  Maximal Assistance   Grooming Assistance 2  Maximal Assistance   UB Bathing Assistance 1  Total Assistance   LB Bathing Assistance 1  Total Assistance   UB Dressing Assistance 1  Total Assistance   LB Dressing Assistance 1  Total [address]  1  Total Assistance   Bed Mobility   Rolling R 2  Maximal assistance   Rolling L 2  Maximal assistance   Additional Comments Patient limited today by increased pain, generalized weakness   Therapeutic Exercise - ROM   UE-ROM Yes   ROM- Right Upper Extremities   R Shoulder AAROM; Flexion   R Elbow AAROM;Elbow flexion;Elbow extension   R Wrist AAROM;Wrist flexion;Wrist extension   R Position Supine   R Weight/Reps/Sets 10 reps each   RUE ROM Comment Active assist needed due to profound weakness   ROM - Left Upper Extremities    L Shoulder AAROM; Flexion   L Elbow AAROM;Elbow flexion;Elbow extension   L Wrist AAROM;Wrist flexion;Wrist extension   L Position Supine   L Weight/Reps/Sets 10 reps each   LUE ROM Comment Active assist needed due to profound weakness   Cognition   Overall Cognitive Status Impaired   Arousal/Participation Alert; Cooperative  (Forgetful)   Attention Attends with cues to redirect   Orientation Level Oriented to person;Disoriented to time;Disoriented to situation   Following Commands Follows one step commands with increased time or repetition   Activity Tolerance   Activity Tolerance Patient limited by fatigue;Treatment limited secondary to medical complications (Comment)  (LImited by weakness)   Assessment   Assessment Patient seen for OT treatment this AM  Patient limited by profound weakness, prolonged hospitalization, on high flow O2  The patient's raw score on the AM-PAC Daily Activity inpatient short form low function score is 15, standardized score is Low Function Daily Activity Standardized Score: 26 28  Patients with a standardized score less than 39 4 are likely to benefit from DC to post-acute rehab services  Patient will continue to benefit from skilled inpatient OT services in order to maximize functional independence and prepare for safe discharge  Continue OT per POC     Plan   OT Frequency 3-5x/wk   Recommendation   OT Discharge Recommendation Post-Acute Rehabilitation Services   AM-PAC Daily Activity Inpatient   Lower Body Dressing 1   Bathing 1   Toileting 1   Upper Body Dressing 1   Grooming 2   Eating 2   Daily Activity Raw Score 8   Turning Head Towards Sound 4   Follow Simple Instructions 3   Low Function Daily Activity Raw Score 15   Low Function Daily Activity Standardized Score 26 28   AM-PAC Applied Cognition Inpatient   Following a Speech/Presentation 3   Understanding Ordinary Conversation 4   Taking Medications 2   Remembering Where Things Are Placed or Put Away 3   Remembering List of 4-5 Errands 2   Taking Care of Complicated Tasks 2   Applied Cognition Raw Score 16   Applied Cognition Standardized Score 18 17   Licensure   [place] License Number  SUSANA Mark[place]/KYRA [id number]

## 2021-02-17 NOTE — SPEECH THERAPY NOTE
Speech Language/Pathology  Speech Language/Pathology  Speech-Language Pathology Bedside Swallow Evaluation        Patient Name: Fer Guzman    ERLWY'W Date: 2/17/2021     Problem List  Principal Problem:    Elevated troponin  Active Problems:    FTT (failure to thrive) in adult    Severe protein-calorie malnutrition (HCC)    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HCC)    Thyroid disorder    Hypotension    Generalized weakness    Sinus tachycardia    COVID-19 virus infection    Goals of care, counseling/discussion    Acute hypoxemic respiratory failure ([facility] )           Past Medical History  Past Medical History:   Diagnosis Date    Anxiety     Disease of thyroid gland     History of OCD (obsessive compulsive disorder)     Hypertension     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[facility] )        Past Surgical History  Past Surgical History:   Procedure Laterality Date    ABDOMINAL SURGERY      BRAIN SURGERY      HYSTERECTOMY         Summary    Pt presents with at least oropharyngeal swallowing skills based on the consistencies assessed during this evaluation  Recommendations:   Diet: regular diet and honey thick liquids VIA SPOON  Meds: crushed with puree   Frequent Oral care: 4x/day  Aspiration precautions and compensatory swallowing strategies: upright posture, only feed when fully alert, slow rate of feeding, small bites/sips, no straws and alternating bites and sips  Other Recommendations/ considerations: dietary- not eating the pudding supplement       Current Medical Status  Pt is a 72 y o  female who presented to [address] [place] [address] as a transfer from [facility], coming to [facility] for further cardiac workup  She initially presented to the [address] [place] [address],[address] due to worsening generalized weakness  She did have mechanical falls at home  Her initial workup showed acute on chronic hyponatremia, elevated troponin and borderline blood pressures, tachycardia    She was followed by Cardiology, Nephrology, endocrinology for the above  Her generalized weakness was suspected to be multifactorial 2/2 hypernatremia, adrenal insufficiency, hypothyroidism, severe malnourishment  Her hyponatremia subsequently improved and her troponins trended downward  She did undergo an echo which showed 25-30% EF dissected to be 2/2 tachycardia, concern for possible takotsubo cardiomyopathy  It was recommended she undergo a nuclear stress test for further ischemic workup which will be completed at [address] [place] [address]  On arrival, patient currently denies all complaints besides generalized weakness  She is concerned about whether not she will still be discharged to a short-term rehab facility as was discussed as a possibility [facility]  Patient denies any chest pain, palpitations, SOB         Past medical history:   Please see H&P for details    Special Studies:  2/13/21: CT head wo contrast:No acute intracranial hemorrhage, midline shift, or mass effect  2/16/21: Chest Xray:    No acute cardiopulmonary disease  Social/Education/Vocational Hx:  Pt lives alone    Swallow Information   Current Risks for Dysphagia & Aspiration: MI  Current Symptoms/Concerns: coughing with po  Current Diet: regular diet and thin liquids   Baseline Diet: regular diet and thin liquids    Baseline Assessment   Behavior/Cognition: alert  Speech/Language Status: able to participate in conversation and able to follow commands  Patient Positioning: upright in bed     Swallow Mechanism Exam   Facial: symmetrical  Labial: WFL  Lingual: WFL  Velum: unable to visualize  Mandible: adequate ROM  Dentition: limited dentition  Vocal quality:clear/adequate   Volitional Cough: weak   Respiratory: HFNC    Consistencies Assessed and Performance   Consistencies Administered: thin liquids, nectar thick, honey thick, puree and hard solids    Oral Stage: grossly WFL  Orientation, mastication, bolus formation, and transfer were adequate and timely with materials trialed today   No significant oral residue was noted  No pocketing was noted  No overt s/s of reduced oral control  Pharyngeal Stage: at least a mild impairment  Swallow initiation appeared prompt  Laryngeal rise was palpated and judged to be within functional limits  Pt  Noted to cough with thin liquids via cup, straw and spoon  Also noted to cough with NTL  HTL via spoon limited s/s of aspiration and assisted in better bolus size control  Pt  Stated that she preferred the HTL because "it soothed her throat'  Ate a small amount of puree however she stated that her anorexia interferes with appetite and overall intake         Esophageal Concerns: none reported      Results Reviewed with: patient and MD   Dysphagia Goals: pt will tolerate least restrictive diet with least restrictive liquids without s/s of aspiration x3-4 sessions  Discharge recommendation: dependent on progress    Speech Therapy Prognosis   Prognosis: deferred    Prognosis Considerations: age, medical status and prior medical history     Camelia Miramontes MS CCC-SLP  Speech Language Pathologist  Available via [facility] License # [id number]  [address] [place] [address] St # [id number]

## 2021-02-17 NOTE — PLAN OF CARE
Problem: Potential for Falls  Goal: Patient will remain free of falls  Description: INTERVENTIONS:  - Assess patient frequently for physical needs  -  Identify cognitive and physical deficits and behaviors that affect risk of falls    -  [place] fall precautions as indicated by assessment   - Educate patient/family on patient safety including physical limitations  - Instruct patient to call for assistance with activity based on assessment  - Modify environment to reduce risk of injury  - Consider OT/PT consult to assist with strengthening/mobility  Outcome: Progressing     Problem: Prexisting or High Potential for Compromised Skin Integrity  Goal: Skin integrity is maintained or improved  Description: INTERVENTIONS:  - Identify patients at risk for skin breakdown  - Assess and monitor skin integrity  - Assess and monitor nutrition and hydration status  - Monitor labs   - Assess for incontinence   - Turn and reposition patient  - Assist with mobility/ambulation  - Relieve pressure over bony prominences  - Avoid friction and shearing  - Provide appropriate hygiene as needed including keeping skin clean and dry  - Evaluate need for skin moisturizer/barrier cream  - Collaborate with interdisciplinary team   - Patient/family teaching  - Consider wound care consult   Outcome: Progressing     Problem: PAIN - ADULT  Goal: Verbalizes/displays adequate comfort level or baseline comfort level  Description: Interventions:  - Encourage patient to monitor pain and request assistance  - Assess pain using appropriate pain scale  - Administer analgesics based on type and severity of pain and evaluate response  - Implement non-pharmacological measures as appropriate and evaluate response  - Consider cultural and social influences on pain and pain management  - Notify physician/advanced practitioner if interventions unsuccessful or patient reports new pain  Outcome: Progressing     Problem: INFECTION - ADULT  Goal: Absence or prevention of progression during hospitalization  Description: INTERVENTIONS:  - Assess and monitor for signs and symptoms of infection  - Monitor lab/diagnostic results  - Monitor all insertion sites, i e  indwelling lines, tubes, and drains  - [place] appropriate cooling/warming therapies per order  - Administer medications as ordered  - Instruct and encourage patient and family to use good hand hygiene technique  - Identify and instruct in appropriate isolation precautions for identified infection/condition  Outcome: Progressing     Problem: SAFETY ADULT  Goal: Patient will remain free of falls  Description: INTERVENTIONS:  - Assess patient frequently for physical needs  -  Identify cognitive and physical deficits and behaviors that affect risk of falls    -  [place] fall precautions as indicated by assessment   - Educate patient/family on patient safety including physical limitations  - Instruct patient to call for assistance with activity based on assessment  - Modify environment to reduce risk of injury  - Consider OT/PT consult to assist with strengthening/mobility  Outcome: Progressing  Goal: Maintain or return to baseline ADL function  Description: INTERVENTIONS:  -  Assess patient's ability to carry out ADLs; assess patient's baseline for ADL function and identify physical deficits which impact ability to perform ADLs (bathing, care of mouth/teeth, toileting, grooming, dressing, etc )  - Assess/evaluate cause of self-care deficits   - Assess range of motion  - Assess patient's mobility; develop plan if impaired  - Assess patient's need for assistive devices and provide as appropriate  - Encourage maximum independence but intervene and supervise when necessary  - Involve family in performance of ADLs  - Assess for home care needs following discharge   - Consider OT consult to assist with ADL evaluation and planning for discharge  - Provide patient education as appropriate  Outcome: Progressing  Goal: Maintain or return mobility status to optimal level  Description: INTERVENTIONS:  - Assess patient's baseline mobility status (ambulation, transfers, stairs, etc )    - Identify cognitive and physical deficits and behaviors that affect mobility  - Identify mobility aids required to assist with transfers and/or ambulation (gait belt, sit-to-stand, lift, walker, cane, etc )  - [place] fall precautions as indicated by assessment  - Record patient progress and toleration of activity level on Mobility SBAR; progress patient to next Phase/Stage  - Instruct patient to call for assistance with activity based on assessment  - Consider rehabilitation consult to assist with strengthening/weightbearing, etc   Outcome: Progressing     Problem: DISCHARGE PLANNING  Goal: Discharge to home or other facility with appropriate resources  Description: INTERVENTIONS:  - Identify barriers to discharge w/patient and caregiver  - Arrange for needed discharge resources and transportation as appropriate  - Identify discharge learning needs (meds, wound care, etc )  - Arrange for interpretive services to assist at discharge as needed  - Refer to Case Management Department for coordinating discharge planning if the patient needs post-hospital services based on physician/advanced practitioner order or complex needs related to functional status, cognitive ability, or social support system  Outcome: Progressing     Problem: Knowledge Deficit  Goal: Patient/family/caregiver demonstrates understanding of disease process, treatment plan, medications, and discharge instructions  Description: Complete learning assessment and assess knowledge base    Interventions:  - Provide teaching at level of understanding  - Provide teaching via preferred learning methods  Outcome: Progressing     Problem: CARDIOVASCULAR - ADULT  Goal: Maintains optimal cardiac output and hemodynamic stability  Description: INTERVENTIONS:  - Monitor I/O, vital signs and rhythm  - Monitor for S/S and trends of decreased cardiac output  - Administer and titrate ordered vasoactive medications to optimize hemodynamic stability  - Assess quality of pulses, skin color and temperature  - Assess for signs of decreased coronary artery perfusion  - Instruct patient to report change in severity of symptoms  Outcome: Progressing  Goal: Absence of cardiac dysrhythmias or at baseline rhythm  Description: INTERVENTIONS:  - Continuous cardiac monitoring, vital signs, obtain 12 lead EKG if ordered  - Administer antiarrhythmic and heart rate control medications as ordered  - Monitor electrolytes and administer replacement therapy as ordered  Outcome: Progressing     Problem: Nutrition/Hydration-ADULT  Goal: Nutrient/Hydration intake appropriate for improving, restoring or maintaining nutritional needs  Description: Monitor and assess patient's nutrition/hydration status for malnutrition  Collaborate with interdisciplinary team and initiate plan and interventions as ordered  Monitor patient's weight and dietary intake as ordered or per policy  Utilize nutrition screening tool and intervene as necessary  Determine patient's food preferences and provide high-protein, high-caloric foods as appropriate       INTERVENTIONS:  - Monitor oral intake, urinary output, labs, and treatment plans  - Assess nutrition and hydration status and recommend course of action  - Evaluate amount of meals eaten  - Assist patient with eating if necessary   - Allow adequate time for meals  - Recommend/ encourage appropriate diets, oral nutritional supplements, and vitamin/mineral supplements  - Order, calculate, and assess calorie counts as needed  - Assess need for intravenous fluids  - Provide specific nutrition/hydration education as appropriate  - Include patient/family/caregiver in decisions related to nutrition  Outcome: Progressing

## 2021-02-17 NOTE — PLAN OF CARE
Problem: OCCUPATIONAL THERAPY ADULT  Goal: Performs self-care activities at highest level of function for planned discharge setting  See evaluation for individualized goals  Outcome: Progressing  Note: Limitation: Decreased ADL status, Decreased UE strength, Decreased Safe judgement during ADL, Decreased endurance, Decreased self-care trans, Decreased high-level ADLs  Prognosis: Good  Assessment: Patient seen for OT treatment this AM  Patient limited by profound weakness, prolonged hospitalization, on high flow O2  The patient's raw score on the AM-PAC Daily Activity inpatient short form low function score is 15, standardized score is Low Function Daily Activity Standardized Score: 26 28  Patients with a standardized score less than 39 4 are likely to benefit from DC to post-acute rehab services  Patient will continue to benefit from skilled inpatient OT services in order to maximize functional independence and prepare for safe discharge  Continue OT per POC       OT Discharge Recommendation: Post-Acute Rehabilitation Services

## 2021-02-17 NOTE — PHYSICAL THERAPY NOTE
PT RE-EVALUATION     02/17/21 1320   PT Last Visit   PT Visit Date 02/17/21   Note Type   Note type Re-Evaluation   Pain Assessment   Pain Assessment Tool Perez-Baker FACES   Perez-Baker FACES Pain Rating 4   Pain Location/Orientation Location: Generalized   Restrictions/Precautions   Other Precautions Airborne/isolation;Contact/isolation; Fall Risk;Bed Alarm; Chair Alarm;O2;Pain  (HFNC)   General   Additional Pertinent History Pt seen for PT re-evaluation due to extended hospital stay due to COVID-19 and ARF with hypoxia  Cognition   Overall Cognitive Status Impaired   Arousal/Participation Cooperative   Attention Attends with cues to redirect   Orientation Level Oriented to person;Disoriented to time;Disoriented to situation   Following Commands Follows one step commands with increased time or repetition   RLE Assessment   RLE Assessment   (Limitations at hip, knee and ankle;3- to 3/5)   LLE Assessment   LLE Assessment   (Limitations at hip, knee and ankle;3- to 3/5)   Bed Mobility   Supine to Sit 2  Maximal assistance   Additional items Assist x 1;Verbal cues   Sit to Supine 2  Maximal assistance   Additional items Assist x 1;Verbal cues   Additional Comments Pt sat on EOB x 10 mins while taking meds from nurse and simutaneously working on sitting balance with PT  Pt declined OOB to chair due to pain  Transfers   Additional Comments Pt declined transfers today;transfers have been with max A + 1   Ambulation/Elevation   Gait pattern   (pt declined standing;standing has been w max A + 1)   Balance   Static Sitting   (F-/F)   Dynamic Sitting Fair -   Activity Tolerance   Activity Tolerance Patient limited by fatigue;Patient limited by pain;Treatment limited secondary to medical complications (Comment)  (weakness;deconditioning)   Assessment   Assessment Pt seen for PT re-evaluation today due to extended hospital stay secondary to COVID-19 and ARF w hypoxia   Pt with good tolerance to functional tasks, bed mob, and sitting EOB/balance  SAO2 on HFNC stayed in the mid to high 90s throughout  However, since initial evaluation, pt's overall function has declined and pt will cont to benefit from skilled PT services to increase pt's strength, balance, endurance and mobility  The patient's AM-PAC Basic Mobility Inpatient Short Form Low Function Raw Score 12 , Standardized Score is 18 33  A standardized score less 42 9 suggests the patient may benefit from discharge to post-acute rehab services  Please also refer to the recommendation of the Physical Therapist for safe discharge planning  Goals   STG Expiration Date 02/24/21   Short Term Goal #1 bed mob - mod A; trans - mod A   Short Term Goal #2 pt will amb with RW and mod A short, functional distances   LTG Expiration Date 03/03/21   Long Term Goal #1 bed mob - mod/min A; trans - mod/min A   Long Term Goal #2 pt will amb with RW with mod/min A functional distances; strength LEs - 3 to 3+/5   Plan   Treatment/Interventions ADL retraining;Functional transfer training;LE strengthening/ROM; Therapeutic exercise; Endurance training;Patient/family training;Equipment eval/education; Bed mobility;Gait training; Compensatory technique education   PT Frequency 5x/wk   Recommendation   PT Discharge Recommendation Post-Acute Rehabilitation Services   Additional Comments While pt supine in bed, PT assisted pt to wash her face/mouth from lunch and assisted pt to hold her container of thickened water with a straw;pt takes increased time for these tasks and needs min A and frequent rest breaks     AM-PAC Basic Mobility Inpatient   Turning in Bed Without Bedrails 1   Lying on Back to Sitting on Edge of Flat Bed 1   Moving Bed to Chair 1   Standing Up From Chair 1   Walk in Room 1   Climb 3-5 Stairs 1   Basic Mobility Inpatient Raw Score 6   Turning Head Towards Sound 3   Follow Simple Instructions 3   Low Function Basic Mobility Raw Score 12   Low Function Basic Mobility Standardized Score 18 33 Barthel Index   Feeding 5   Bathing 0   Grooming Score 0   Dressing Score 0   Bladder Score 0   Bowels Score 5   Toilet Use Score 5   Transfers (Bed/Chair) Score 5   Mobility (Level Surface) Score 0   Stairs Score 0   Barthel Index Score 21   Licensure   [place] License Number  Codey [place], [place] [id number]

## 2021-02-17 NOTE — PLAN OF CARE
Problem: Potential for Falls  Goal: Patient will remain free of falls  Description: INTERVENTIONS:  - Assess patient frequently for physical needs  -  Identify cognitive and physical deficits and behaviors that affect risk of falls    -  [place] fall precautions as indicated by assessment   - Educate patient/family on patient safety including physical limitations  - Instruct patient to call for assistance with activity based on assessment  - Modify environment to reduce risk of injury  - Consider OT/PT consult to assist with strengthening/mobility  Outcome: Progressing     Problem: Prexisting or High Potential for Compromised Skin Integrity  Goal: Skin integrity is maintained or improved  Description: INTERVENTIONS:  - Identify patients at risk for skin breakdown  - Assess and monitor skin integrity  - Assess and monitor nutrition and hydration status  - Monitor labs   - Assess for incontinence   - Turn and reposition patient  - Assist with mobility/ambulation  - Relieve pressure over bony prominences  - Avoid friction and shearing  - Provide appropriate hygiene as needed including keeping skin clean and dry  - Evaluate need for skin moisturizer/barrier cream  - Collaborate with interdisciplinary team   - Patient/family teaching  - Consider wound care consult   Outcome: Progressing     Problem: PAIN - ADULT  Goal: Verbalizes/displays adequate comfort level or baseline comfort level  Description: Interventions:  - Encourage patient to monitor pain and request assistance  - Assess pain using appropriate pain scale  - Administer analgesics based on type and severity of pain and evaluate response  - Implement non-pharmacological measures as appropriate and evaluate response  - Consider cultural and social influences on pain and pain management  - Notify physician/advanced practitioner if interventions unsuccessful or patient reports new pain  Outcome: Progressing     Problem: INFECTION - ADULT  Goal: Absence or prevention of progression during hospitalization  Description: INTERVENTIONS:  - Assess and monitor for signs and symptoms of infection  - Monitor lab/diagnostic results  - Monitor all insertion sites, i e  indwelling lines, tubes, and drains  - [place] appropriate cooling/warming therapies per order  - Administer medications as ordered  - Instruct and encourage patient and family to use good hand hygiene technique  - Identify and instruct in appropriate isolation precautions for identified infection/condition  Outcome: Progressing     Problem: SAFETY ADULT  Goal: Patient will remain free of falls  Description: INTERVENTIONS:  - Assess patient frequently for physical needs  -  Identify cognitive and physical deficits and behaviors that affect risk of falls    -  [place] fall precautions as indicated by assessment   - Educate patient/family on patient safety including physical limitations  - Instruct patient to call for assistance with activity based on assessment  - Modify environment to reduce risk of injury  - Consider OT/PT consult to assist with strengthening/mobility  Outcome: Progressing  Goal: Maintain or return to baseline ADL function  Description: INTERVENTIONS:  -  Assess patient's ability to carry out ADLs; assess patient's baseline for ADL function and identify physical deficits which impact ability to perform ADLs (bathing, care of mouth/teeth, toileting, grooming, dressing, etc )  - Assess/evaluate cause of self-care deficits   - Assess range of motion  - Assess patient's mobility; develop plan if impaired  - Assess patient's need for assistive devices and provide as appropriate  - Encourage maximum independence but intervene and supervise when necessary  - Involve family in performance of ADLs  - Assess for home care needs following discharge   - Consider OT consult to assist with ADL evaluation and planning for discharge  - Provide patient education as appropriate  Outcome: Progressing  Goal: Maintain or return mobility status to optimal level  Description: INTERVENTIONS:  - Assess patient's baseline mobility status (ambulation, transfers, stairs, etc )    - Identify cognitive and physical deficits and behaviors that affect mobility  - Identify mobility aids required to assist with transfers and/or ambulation (gait belt, sit-to-stand, lift, walker, cane, etc )  - [place] fall precautions as indicated by assessment  - Record patient progress and toleration of activity level on Mobility SBAR; progress patient to next Phase/Stage  - Instruct patient to call for assistance with activity based on assessment  - Consider rehabilitation consult to assist with strengthening/weightbearing, etc   Outcome: Progressing     Problem: DISCHARGE PLANNING  Goal: Discharge to home or other facility with appropriate resources  Description: INTERVENTIONS:  - Identify barriers to discharge w/patient and caregiver  - Arrange for needed discharge resources and transportation as appropriate  - Identify discharge learning needs (meds, wound care, etc )  - Arrange for interpretive services to assist at discharge as needed  - Refer to Case Management Department for coordinating discharge planning if the patient needs post-hospital services based on physician/advanced practitioner order or complex needs related to functional status, cognitive ability, or social support system  Outcome: Progressing     Problem: Knowledge Deficit  Goal: Patient/family/caregiver demonstrates understanding of disease process, treatment plan, medications, and discharge instructions  Description: Complete learning assessment and assess knowledge base    Interventions:  - Provide teaching at level of understanding  - Provide teaching via preferred learning methods  Outcome: Progressing     Problem: Nutrition/Hydration-ADULT  Goal: Nutrient/Hydration intake appropriate for improving, restoring or maintaining nutritional needs  Description: Monitor and assess patient's nutrition/hydration status for malnutrition  Collaborate with interdisciplinary team and initiate plan and interventions as ordered  Monitor patient's weight and dietary intake as ordered or per policy  Utilize nutrition screening tool and intervene as necessary  Determine patient's food preferences and provide high-protein, high-caloric foods as appropriate       INTERVENTIONS:  - Monitor oral intake, urinary output, labs, and treatment plans  - Assess nutrition and hydration status and recommend course of action  - Evaluate amount of meals eaten  - Assist patient with eating if necessary   - Allow adequate time for meals  - Recommend/ encourage appropriate diets, oral nutritional supplements, and vitamin/mineral supplements  - Order, calculate, and assess calorie counts as needed  - Assess need for intravenous fluids  - Provide specific nutrition/hydration education as appropriate  - Include patient/family/caregiver in decisions related to nutrition  Outcome: Progressing

## 2021-02-17 NOTE — PROGRESS NOTES
Progress Note Barbara Fraga 1955, 72 y o  female MRN: [id number]    Unit/Bed#: [address] [place] [address] Encounter: [id number]    Primary Care Provider: Bereket Luke DO   Date and time admitted to hospital: 2/4/2021  1:25 AM        COVID-19 virus infection  Assessment & Plan  Patient tested positive for COVID on February 8, 2021  ·  Over the past couple of days patient's oxygen requirements have been increasing and patient is currently on high-flow oxygen at 15 liters/minute  · Patient has completed course of remdesivir on February 13, 2021  Patient refusing Decadron  · Ferritin 453-507  · -248  · Procalcitonin level 3 98  · Patient continues to get worse over the past 2 days and coordinate care discussed with patient's son that overall prognosis is guarded        Results from last 7 days   Lab Units 02/16/21  0610 02/15/21  0503 02/14/21  0524 02/13/21  0554 02/12/21  0611 02/11/21  0131 02/09/21  1559 02/09/21  1401   CRP mg/L  --  79 0* 92 9* 154 3* 229 1* 85 4*  --  109 7*   FERRITIN ng/mL  --  423* 525* 687* 503* 355 172  --    D-DIMER QUANTITATIVE ug/ml FEU 1 03*  --  0 98*  --   --  1 20* 1 14*  --                  Acute hypoxemic respiratory failure (HCC)  Assessment & Plan  Patient noted to have increasing oxygen requirements over the past 2 days    Patient was initially on 3 liters oxygen at which is being up titrated to mid flow and currently on high-flow at 15 liters/minute  Secondary to COVID-19 infection  For overall prognosis  Prolonged discussion held with the son regarding overall poor prognosis  CT of the chest rule out PE is pending at the current time    Hyponatremia-resolved as of 2/10/2021  Assessment & Plan  Multifactorial 2/2 poor oral intake, adrenal insufficiency, SIADH  - sodium was 120 on admission and has since improved  - IVF discontinued    Results from last 7 days   Lab Units 02/16/21  0610 02/15/21  0503 02/14/21  0280 02/13/21  0554 02/12/21  6325 02/11/21  0131 SODIUM mmol/L 138 140 143 138 138 136       Goals of care, counseling/discussion  Assessment & Plan  Dr Umanzor -- Had discussed with patient's son Phill Haskins and discussed patient's multiple comorbidities including cardiac issues, failure to thrive, COVID infection etc   Patient's mental status also seems to wax and wane  Spoke with him that her overall prognosis is poor and unclear how much she will recover from this if any  Patient also with decreased p o  Intake and at times refuses treatments  Son stated that he does not want any aggressive measures and that the patient has been declining even at baseline  Discussed with son again due to worsening oxygen requirement and patient being noncompliant with medical treatment here  He understands that she can get progressively worse if she continues to refuse treatment  This was also discussed with patient  Repeat CTA head was done as patient appeared confused previously and it was negative for acute pathology  Patient continues to have increasing oxygen requirements with poor oral intake and difficulty swallowing  Prolonged discussion again held with the patient regarding overall prognosis in coordination with pulmonology    Sinus tachycardia  Assessment & Plan  Patient was loaded with digoxin and started on digoxin 125 micrograms p o  Daily which has been discontinued  Patient on Toprol-XL 25 milligram p o  Daily at bedtime along with midodrine 5 milligram p o  T i d    heart rate has improved overall compared to before  Per Cardiology, patient with history of sinus tachycardia over the last 2-3 years  Patient's orthostatic vital signs were negative      * Elevated troponin  Assessment & Plan  Patient presented to the ED at [facility] with an elevated troponin peaked at 3 6  Likely non MI troponin elevation in the setting of stress cardiomyopathy- patient underwent echo which showed an EF of 30%  Patient underwent nuclear stress test  which was markedly abnormal with mild diffuse decrease in uptake in apical and mid walls involving all the walls  LVEF was severely decreased with apical ballooning  Continue medical management as per Cardiology        Generalized weakness  Assessment & Plan  Generalized weakness - multifactorial, hyponatremia, steroid, thyroid medication adjustments  Currently generalized weakness is most likely 2/2 failure to thrive, severe protein calorie malnutrition, COVID infection  - PT OT      Hypotension  Assessment & Plan  Patient's home medications were on hold due to borderline low blood pressure  Patient was started on low-dose beta-blocker due to sinus tachycardia  midodrine 5 milligram p o  T i d  was added due to low BP    Thyroid disorder  Assessment & Plan  Levothyroxine dose increased to 50 mcg daily    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HCC)  Assessment & Plan  Continue IV hydrocortisone - 15 mg in the morning,10 mg at 4:00 p m  on d/c  -appreciate Endocrinology recommendations  Severe protein-calorie malnutrition ([address] )  Assessment & Plan  Malnutrition Findings:   Adult Malnutrition type: Chronic illness(Severe protein calorie malnutrition due to inadequate energy intake as evidenced by hollow orbits, depressed temples and protruding clavicles  Treated with Regular diet and supplements)   Adult Degree of Malnutrition: Other severe protein calorie malnutrition  BMI Findings:  Adult BMI Classifications: Underweight < 18 5  Body mass index is 16 26 kg/m²  Nutrition consult appreciated  Continue diet with nutritional supplementation    FTT (failure to thrive) in adult  Assessment & Plan  Patient has a history of anorexia nervosa    Continue thiamine, phosphate supplements  Patient was coughing with all the food and patient was seen by speech therapy  Patient was started on dysphagia 3 diet with nectar thick liquids    Constipation-resolved as of 2/11/2021  Assessment & Plan  Patient did not have a bowel movement for 1 week  Continue MiraLax 17 grams p o  Daily  Patient had a large brown bowel movement with enema on       VTE Pharmacologic Prophylaxis:   Pharmacologic: Enoxaparin (Lovenox)  Mechanical VTE Prophylaxis in Place: Yes    Patient Centered Rounds: I have performed bedside rounds with nursing staff today  Discussions with Specialists or Other Care Team Provider: Shira Molina    Education and Discussions with Family / Patient: yes Son Ainsley Garrison    Time Spent for Care: 45 minutes  More than 50% of total time spent on counseling and coordination of care as described above  Current Length of Stay: 13 day(s)    Current Patient Status: Inpatient   Certification Statement: The patient will continue to require additional inpatient hospital stay due to Acute hypoxic respiratory failure, COVID-19    Discharge Plan:  Pending course    Code Status: Level 3 - DNAR and DNI      Subjective:   Patient has and difficulty swallowing and was choking on all the pills and even applesauce as per RN  Patient herself denies any shortness of breath but reports that she is feeling cold  Objective:     Vitals:   Temp (24hrs), Av 8 °F (36 6 °C), Min:96 4 °F (35 8 °C), Max:98 8 °F (37 1 °C)    Temp:  [96 4 °F (35 8 °C)-98 8 °F (37 1 °C)] 96 4 °F (35 8 °C)  HR:  [] 101  Resp:  [18-22] 21  BP: (109-132)/(59-74) 112/70  SpO2:  [91 %-100 %] 100 %  Body mass index is 16 26 kg/m²  Input and Output Summary (last 24 hours): Intake/Output Summary (Last 24 hours) at 2021 1533  Last data filed at 2021 1750  Gross per 24 hour   Intake 60 ml   Output 200 ml   Net -140 ml       Physical Exam:     Physical Exam  Constitutional:       General: She is not in acute distress  Comments: Patient looks emaciated and sick and frail   HENT:      Head: Normocephalic and atraumatic  Nose: Nose normal    Eyes:      Conjunctiva/sclera: Conjunctivae normal       Pupils: Pupils are equal, round, and reactive to light  Neck:      Musculoskeletal: Normal range of motion and neck supple  Cardiovascular:      Rate and Rhythm: Normal rate and regular rhythm  Heart sounds: Normal heart sounds  Pulmonary:      Effort: Pulmonary effort is normal  No respiratory distress  Breath sounds: Normal breath sounds  No wheezing or rales  Abdominal:      General: Bowel sounds are normal  There is no distension  Palpations: Abdomen is soft  Tenderness: There is no abdominal tenderness  There is no guarding or rebound  Skin:     General: Skin is warm and dry  Findings: No rash  Neurological:      Mental Status: She is alert  Cranial Nerves: No cranial nerve deficit  Additional Data:     Labs:    Results from last 7 days   Lab Units 02/16/21  0610   WBC Thousand/uL 6 99   HEMOGLOBIN g/dL 8 3*   HEMATOCRIT % 25 9*   PLATELETS Thousands/uL 295     Results from last 7 days   Lab Units 02/16/21  0610   POTASSIUM mmol/L 3 8   CHLORIDE mmol/L 103   CO2 mmol/L 28   BUN mg/dL 11   CREATININE mg/dL 0 52*   CALCIUM mg/dL 7 3*   ALK PHOS U/L 53   ALT U/L 19   AST U/L 35           * I Have Reviewed All Lab Data Listed Above  * Additional Pertinent Lab Tests Reviewed:  [facility] Admission Reviewed    Imaging:    Imaging Reports Reviewed Today Include:  Chest x-ray      Recent Cultures (last 7 days):           Last 24 Hours Medication List:   Current Facility-Administered Medications   Medication Dose Route Frequency Provider Last Rate    acetaminophen  650 mg Rectal Q6H PRN Oneda Blazing, PA-C      ARIPiprazole  15 mg Oral BID Oneda Blazing, PA-C      aspirin  81 mg Oral Daily Oneda Blazing, PA-C      cholecalciferol  2,000 Units Oral Daily Virginia Revankar, DO      clonazePAM  0 5 mg Oral BID Oneda Blazing, PA-C      dexamethasone  6 mg Intravenous Q24H Virginia Revankar, DO      enoxaparin  1 mg/kg Subcutaneous Q12H [facility], PA-C      ferrous sulfate  325 mg Oral Daily With Breakfast Natanael Lundberg PA-C      hydrocortisone sodium succinate  10 mg Intravenous Daily Natanael Lundberg PA-C      hydrocortisone sodium succinate  15 mg Intravenous Early Morning Natanael Lundberg PA-C      levothyroxine  50 mcg Oral Early Morning Natanael Lundberg PA-C      metoprolol succinate  25 mg Oral HS NANI Davey      midodrine  5 mg Oral TID AC NANI Davey      multivitamin-minerals  1 tablet Oral Daily Virginia Revankar, DO      ondansetron  4 mg Intravenous Q6H PRN Natanael Lundberg PA-C      polyethylene glycol  17 g Oral Daily Natanael Lundberg PA-C      potassium chloride  20 mEq Oral Daily Virginia Revankar, DO      potassium-sodium phosphateS  1 tablet Oral TID With Meals Natanael Lundberg PA-C      senna-docusate sodium  1 tablet Oral HS NANI Davey      thiamine  100 mg Oral Daily Natanael Lundberg PA-C      traZODone  50 mg Oral HS Natanael Lundberg PA-C          Today, Patient Was Seen By: Kim Crowe MD    ** Please Note: Dictation voice to text software may have been used in the creation of this document   **

## 2021-02-17 NOTE — ASSESSMENT & PLAN NOTE
Patient noted to have increasing oxygen requirements over the past 2 days    Patient was initially on 3 liters oxygen at which is being up titrated to mid flow and currently on high-flow at 15 liters/minute  Secondary to COVID-19 infection  For overall prognosis  Prolonged discussion held with the son regarding overall poor prognosis  CT of the chest rule out PE is pending at the current time

## 2021-02-17 NOTE — CONSULTS
Consult Note - Wound   Aubrey Guardador 72 y o  female MRN: [id number]  Unit/Bed#: [address] [place] [address] Encounter: [id number]      Assessment:   Wound care nurse virtual assessment  Patient is Covid +  Patient with sacral intact DTPI, GARCIA  First documented on 2/15  I was consulted today  Patient admitted on 2/4/21  DTPI Covers sacral area and extends across horizontally to butocks  Center is purplish/maroonish with surrounding skin non-blanchable redness  Intact serous blister on right buttock  Patient incontinent of stool and urine  Max Amas in place however not reliably suctioning urine  On a P-500 low air loss air mattress  Wound/Skin Care Plan:   1  Continue P-500 low air loss air mattress  2  Due to incontinence, will use CASP on Sacral/upper buttocks intact  DTPI  Apply today and apply every 3 days  Apply as single coat with minimal overlap  3  Protect intact serous blister  If blister on right buttock ruptures, cleanse and begin applying Calazime three times a day and prn  4  Pressure redistribution cushion to chair  5  Offload heels  6  Moisturize skin each day  7  Wound care nurse follow-up  Images reviewed  Discussed with Autumn Funes RN  Will provide CASP wands  Vitals: Blood pressure 112/70, pulse 101, temperature (!) 96 4 °F (35 8 °C), temperature source Tympanic, resp  rate 21, height 4' 10" (1 473 m), weight 35 3 kg (77 lb 13 2 oz), SpO2 100 %  ,Body mass index is 16 26 kg/m²         Images taken 2/3/21

## 2021-02-18 NOTE — PLAN OF CARE
Problem: Potential for Falls  Goal: Patient will remain free of falls  Description: INTERVENTIONS:  - Assess patient frequently for physical needs  -  Identify cognitive and physical deficits and behaviors that affect risk of falls    -  [place] fall precautions as indicated by assessment   - Educate patient/family on patient safety including physical limitations  - Instruct patient to call for assistance with activity based on assessment  - Modify environment to reduce risk of injury  - Consider OT/PT consult to assist with strengthening/mobility  Outcome: Progressing     Problem: Prexisting or High Potential for Compromised Skin Integrity  Goal: Skin integrity is maintained or improved  Description: INTERVENTIONS:  - Identify patients at risk for skin breakdown  - Assess and monitor skin integrity  - Assess and monitor nutrition and hydration status  - Monitor labs   - Assess for incontinence   - Turn and reposition patient  - Assist with mobility/ambulation  - Relieve pressure over bony prominences  - Avoid friction and shearing  - Provide appropriate hygiene as needed including keeping skin clean and dry  - Evaluate need for skin moisturizer/barrier cream  - Collaborate with interdisciplinary team   - Patient/family teaching  - Consider wound care consult   Outcome: Progressing     Problem: PAIN - ADULT  Goal: Verbalizes/displays adequate comfort level or baseline comfort level  Description: Interventions:  - Encourage patient to monitor pain and request assistance  - Assess pain using appropriate pain scale  - Administer analgesics based on type and severity of pain and evaluate response  - Implement non-pharmacological measures as appropriate and evaluate response  - Consider cultural and social influences on pain and pain management  - Notify physician/advanced practitioner if interventions unsuccessful or patient reports new pain  Outcome: Progressing     Problem: INFECTION - ADULT  Goal: Absence or prevention of progression during hospitalization  Description: INTERVENTIONS:  - Assess and monitor for signs and symptoms of infection  - Monitor lab/diagnostic results  - Monitor all insertion sites, i e  indwelling lines, tubes, and drains  - [place] appropriate cooling/warming therapies per order  - Administer medications as ordered  - Instruct and encourage patient and family to use good hand hygiene technique  - Identify and instruct in appropriate isolation precautions for identified infection/condition  Outcome: Progressing     Problem: SAFETY ADULT  Goal: Patient will remain free of falls  Description: INTERVENTIONS:  - Assess patient frequently for physical needs  -  Identify cognitive and physical deficits and behaviors that affect risk of falls    -  [place] fall precautions as indicated by assessment   - Educate patient/family on patient safety including physical limitations  - Instruct patient to call for assistance with activity based on assessment  - Modify environment to reduce risk of injury  - Consider OT/PT consult to assist with strengthening/mobility  Outcome: Progressing  Goal: Maintain or return to baseline ADL function  Description: INTERVENTIONS:  -  Assess patient's ability to carry out ADLs; assess patient's baseline for ADL function and identify physical deficits which impact ability to perform ADLs (bathing, care of mouth/teeth, toileting, grooming, dressing, etc )  - Assess/evaluate cause of self-care deficits   - Assess range of motion  - Assess patient's mobility; develop plan if impaired  - Assess patient's need for assistive devices and provide as appropriate  - Encourage maximum independence but intervene and supervise when necessary  - Involve family in performance of ADLs  - Assess for home care needs following discharge   - Consider OT consult to assist with ADL evaluation and planning for discharge  - Provide patient education as appropriate  Outcome: Progressing  Goal: Maintain or return mobility status to optimal level  Description: INTERVENTIONS:  - Assess patient's baseline mobility status (ambulation, transfers, stairs, etc )    - Identify cognitive and physical deficits and behaviors that affect mobility  - Identify mobility aids required to assist with transfers and/or ambulation (gait belt, sit-to-stand, lift, walker, cane, etc )  - [place] fall precautions as indicated by assessment  - Record patient progress and toleration of activity level on Mobility SBAR; progress patient to next Phase/Stage  - Instruct patient to call for assistance with activity based on assessment  - Consider rehabilitation consult to assist with strengthening/weightbearing, etc   Outcome: Progressing

## 2021-02-18 NOTE — ASSESSMENT & PLAN NOTE
Patient's home medications were on hold due to borderline low blood pressure  Patient was started on low-dose beta-blocker due to sinus tachycardia  midodrine 5 milligram p o  T i d  was added due to low BP during the hospital stay

## 2021-02-18 NOTE — PROGRESS NOTES
Progress Note - Pulmonary   Pomona Phani MACDONALD 72 y o  female MRN: [id number]  Unit/Bed#: [address] [place] [address] Encounter: [id number]  Code Status: Level 3 - DNAR and DNI    Soniya is a 72 y o  Past Medical History:   Diagnosis Date    Anxiety     Disease of thyroid gland     History of OCD (obsessive compulsive disorder)     Hypertension     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HCC)        Assessment/Plan:   Acute hypoxemic respiratory failure (HCC)  Assessment & Plan  Again with significantly worsened hypoxemia  Now on HFNC 100% 02   Prior desaturations to 70s% range > slowly improved to 95% on 100 % FIO2   COVID related  Nursing was unable to get viable venous access large enough to get CTA  Unable to obtain CTA, however, this is very unlikely to change outcome regardless given her severe FTT / COVID 19/ sever malnourishment    Goals of care, counseling/discussion  Assessment & Plan  Patient would like to continue medical therapy but not have advanced interventions / therapies  Discussed both yesterday and again this morning with son Lou Fuller  He agrees with comfort level of care and understands her prognosis is dismal and will result in her demise  He agrees to change to comfort care for her  Conversation discussed between myself, Dr Simón Bowser, and w/ Son Lou Fuller      COVID-19 virus infection  Assessment & Plan  Tested + 2/8  Completed 5 day course on 2/13 of Remdesivir  Day # 7 of steroids    Lab Results   Component Value Date    SARSCOV2 Positive (A) 02/08/2021    SARSCOV2 Negative 02/01/2021    SARSCOV2 Negative 09/24/2020     Results from last 7 days   Lab Units 02/18/21  0537 02/17/21  0500 02/16/21  1737 02/16/21  0610 02/15/21  0503 02/14/21  0524 02/13/21  0554 02/12/21  4088   D-DIMER QUANTITATIVE ug/ml FEU  --   --   --  1 03*  --  0 98*  --   --    CRP mg/L 152 6* 248 5* 207 8*  --  79 0* 92 9* 154 3* 229 1*   FERRITIN ng/mL  --  507* 453*  --  423* 525* 687* 503*               FTT (failure to thrive) in adult  Assessment & Plan  FTT   Weak and deconditioned  Malnourished and Cachectic  Poor overall general condition / stamina  Worsening clinical status        _________________________________________________    Subjective: Pt seen and examined at bedside    Chief Complaint:  Unable to provide CC / lethargic     Labs Reviewed: yes   Imaging Reviewed: yes   Collaborative Discussion:  Yes, case discussed with IM team and son as well as Pulmonary attending    Tele Events:     Vitals:   Temp:  [98 °F (36 7 °C)-98 4 °F (36 9 °C)] 98 4 °F (36 9 °C)  HR:  [] 110  Resp:  [20-25] 25  BP: (113-120)/(64-67) 113/67  FiO2 (%):  [] 100  Weight (last 2 days)     Date/Time   Weight    21 0600   30 7 (67 68)    21 0600   35 3 (77 82)    21 0600   35 3 (77 82)            Vitals:    21 0600 21 0726 21 1005 21 1019   BP:    113/67   BP Location:    Right arm   Pulse:    (!) 110   Resp:    (!) 25   Temp:    98 4 °F (36 9 °C)   TempSrc:    Oral   SpO2:  98% (!) 86% 90%   Weight: 30 7 kg (67 lb 10 9 oz)      Height:         Temp (24hrs), Av 2 °F (36 8 °C), Min:98 °F (36 7 °C), Max:98 4 °F (36 9 °C)  Current: Temperature: 98 4 °F (36 9 °C)        SpO2: SpO2: 90 %, SpO2 Activity: SpO2 Activity: At Rest, SpO2 Device: O2 Device: High flow nasal cannula      IV Infusions:       Nutrition:        Diet Orders   (From admission, onward)             Start     Ordered    21 1137  Diet Dysphagia/Modified Consistency; Dysphagia 3-Dental Soft; Honey Thick Liquid  Diet effective now     Comments: Administer liquids with Spoon  Medications crushed in applesauce   Question Answer Comment   Diet Type Dysphagia/Modified Consistency    Dysphagia/Modified Consistency Dysphagia 3-Dental Soft    Liquid Modifier Honey Thick Liquid    Special Instructions Disposable Meal    RD to adjust diet per protocol?  Yes        21 1137    21 1258  Room Service  Once     Question:  Type of Service Answer:  Room Service-Appropriate    02/04/21 1258    02/04/21 0152  Dietary nutrition supplements  Once     Question Answer Comment   Select Supplement: Ensure Pudding-Chocolate    Frequency Breakfast, Lunch, Dinner        02/04/21 0151                Ins/Outs:   I/O       02/16 0701 - 02/17 0700 02/17 0701 - 02/18 0700 02/18 0701 - 02/19 0700    P  O  60      Total Intake(mL/kg) 60 (1 7)      Urine (mL/kg/hr) 200 (0 2)      Total Output 200      Net -140                   Lines/Drains:  Invasive Devices     Peripheral Intravenous Line            Peripheral IV 02/15/21 Left; Lower Arm 2 days          Drain            External Urinary Catheter 9 days                 Active medications:  Scheduled Meds:  Current Facility-Administered Medications   Medication Dose Route Frequency Provider Last Rate    acetaminophen  650 mg Rectal Q6H PRN Elizabeth Lynne PA-C      ARIPiprazole  15 mg Oral BID Elizabeth Lynne PA-C      aspirin  81 mg Oral Daily Elizabeth Lynne PA-C      cholecalciferol  2,000 Units Oral Daily Virginia Revankar, DO      clonazePAM  0 5 mg Oral BID Elizabeth Lynne PA-C      dexamethasone  6 mg Intravenous Q24H Virginia Revankar, DO      ferrous sulfate  325 mg Oral Daily With Breakfast Elizabeth Lynne PA-C      hydrocortisone sodium succinate  10 mg Intravenous Daily Elizabeth Lynne PA-C      hydrocortisone sodium succinate  15 mg Intravenous Early Morning Elizabeth Lynne PA-C      levothyroxine  50 mcg Oral Early Morning Elizabeth Lynne PA-C      metoprolol succinate  25 mg Oral HS NANI Chowdhury      midodrine  5 mg Oral TID AC NANI Chowdhury      multivitamin-minerals  1 tablet Oral Daily Virginia Revankar, DO      ondansetron  4 mg Intravenous Q6H PRN Elizabeth Lynne PA-C      polyethylene glycol  17 g Oral Daily Elizabeth Lynne PA-C      potassium chloride  20 mEq Oral Daily Virginia Revankar, DO      potassium-sodium phosphateS  1 tablet Oral TID With Meals Elizabeth Lynne PA-C      senna-docusate sodium  1 tablet Oral HS NANI Camargo      thiamine  100 mg Oral Daily Gabriella Mcmullen PA-C      traZODone  50 mg Oral HS Gabriella Mcmullen PA-C       PRN Meds:acetaminophen, 650 mg, Q6H PRN  ondansetron, 4 mg, Q6H PRN      ____________________________________________________________________    Physical Exam  Constitutional:       General: She is in acute distress  Appearance: She is well-developed  She is cachectic  She is ill-appearing  HENT:      Head: Normocephalic and atraumatic  Eyes:      Conjunctiva/sclera: Conjunctivae normal       Pupils: Pupils are equal, round, and reactive to light  Neck:      Musculoskeletal: Normal range of motion and neck supple  Cardiovascular:      Rate and Rhythm: Normal rate and regular rhythm  Heart sounds: Normal heart sounds  No murmur  No friction rub  Pulmonary:      Effort: Pulmonary effort is normal  No respiratory distress  Breath sounds: Normal breath sounds  No stridor  No wheezing or rales  Chest:      Chest wall: No tenderness  Comments: On 100% HFNC  Abdominal:      General: Bowel sounds are normal       Palpations: Abdomen is soft  Musculoskeletal: Normal range of motion  Comments: Gaunt/ frail   Skin:     General: Skin is warm and dry  Neurological:      Mental Status: She is oriented to person, place, and time   She is lethargic        ____________________________________________________________________    Invasive/non-invasive ventilation settings   Respiratory    Lab Data (Last 4 hours)    None         O2/Vent Data (Last 4 hours)      02/18 0726 02/18 1005        Non-Invasive Ventilation Mode HFNC (High flow) HFNC (High flow)                  Laboratory and Diagnostics:  Results from last 7 days   Lab Units 02/18/21  0537 02/16/21  0610 02/15/21  0503 02/14/21  0524 02/13/21  0554 02/12/21  0611   WBC Thousand/uL 9 11 6 99 10 73* 7 18 9 26 9 81   HEMOGLOBIN g/dL 7 9* 8 3* 8 8* 8 7* 9 8* 9 7* HEMATOCRIT % 24 7* 25 9* 27 9* 26 7* 29 8* 30 0*   PLATELETS Thousands/uL 371 295 292 273 263 250   BANDS PCT % 10*  --   --   --   --   --    MONO PCT % 4  --   --   --   --   --      Results from last 7 days   Lab Units 02/18/21  0537 02/16/21  1737 02/16/21  0744 02/16/21  0610 02/15/21  0503 02/14/21  0524 02/13/21  0554 02/12/21  0611   SODIUM mmol/L 142  --   --  138 140 143 138 138   POTASSIUM mmol/L 3 2*  --   --  3 8 3 2* 3 7 3 0* 3 2*   CHLORIDE mmol/L 105  --   --  103 102 107 102 101   CO2 mmol/L 30  --   --  28 32 32 32 32   ANION GAP mmol/L 7  --   --  7 6 4 4 5   BUN mg/dL 13  --   --  11 10 13 12 14   CREATININE mg/dL 0 52*  --   --  0 52* 0 49* 0 40* 0 45* 0 50*   CALCIUM mg/dL 8 2*  --   --  7 3* 8 2* 7 8* 7 9* 7 8*   GLUCOSE RANDOM mg/dL 111 25* 24* 30* 62* 77 96 86   ALT U/L 21  --   --  19 22 17 17 17   AST U/L 19  --   --  35 40 31 32 37   ALK PHOS U/L 61  --   --  53 51 44* 46 41*   ALBUMIN g/dL 2 3*  --   --  2 1* 2 5* 2 1* 2 3* 2 3*   TOTAL BILIRUBIN mg/dL 1 40*  --   --  0 90 0 60 0 40 0 40 0 30     Results from last 7 days   Lab Units 02/16/21  0610 02/14/21  0524   MAGNESIUM mg/dL 1 9 2 1                   ABG:    VBG:    Results from last 7 days   Lab Units 02/17/21  0500   PROCALCITONIN ng/ml 3 95*       Micro        Imaging:   XR chest portable   Final Result by Chandni Diaz MD (02/16 1359)      No acute cardiopulmonary disease  Workstation performed: [id number]         CT head wo contrast   Final Result by Andre Velasco MD (02/13 9507)      No acute intracranial hemorrhage, midline shift, or mass effect  Workstation performed: [id number]             Micro:   Lab Results   Component Value Date    BLOODCX No Growth After 5 Days  02/01/2021    BLOODCX No Growth After 5 Days   02/01/2021            Invalid input(s): Bao Cid

## 2021-02-18 NOTE — ASSESSMENT & PLAN NOTE
Generalized weakness - multifactorial, hyponatremia, steroid, thyroid medication adjustments    Currently generalized weakness is most likely 2/2 failure to thrive, severe protein calorie malnutrition, COVID infection

## 2021-02-18 NOTE — DEATH NOTE
INPATIENT DEATH NOTE  Analilia Hicks 72 y o  female MRN: [id number]  Unit/Bed#: [address] [place] [address] Encounter: [id number]         Patient's Information  Pronounced by: dr Otero Frame at 1500  Did the patient's death occur in the ED?: No  Did the patient's death occur in the OR?: No  Did the patient's death occur less than 10 days post-op?: No  Did the patient's death occur within 24 hours of admission?: No  Was code status DNR at the time of death?: Yes    PHYSICAL EXAM:  Unresponsive to noxious stimuli, Spontaneous respirations absent, Breath sounds absent, Carotid pulse absent, Heart sounds absent and Pupillary light reflex absent    Medical Examiner notification criteria:  NONE APPLICABLE   Medical Examiner's office notified?:  No, does not meet ME notification criteria   Medical Examiner accepted case?:  No    Family Notification  Was the family notified?: Yes  Date Notified: 21  Time Notified: 80  Notified by: Makenzie Banks rn  Name of Family Notified of Death: gabby larose   Relationship to Patient: Son  Family Notification Route: Telephone  Was the family told to contact a  home?: Yes  Name of [facility][de[facility]identified] unknown at this time, pt given nursing sup contact      Primary Service Attending Physician notified?:  yes - Attending:  Roderick Harrell MD    Physician/Resident responsible for completing Discharge Summary:  Dr Makayla Yancey

## 2021-02-18 NOTE — ASSESSMENT & PLAN NOTE
Patient noted to have increasing oxygen requirements over the past 2 days  Patient was initially on 3 liters oxygen at which is being up titrated to mid flow and currently on high-flow at 15 liters/minute  Secondary to COVID-19 infection  Patient was having increasing oxygen requirement and patient was on high-flow oxygen at 30 liters with 100% FiO2  Prolonged discussion again held with patient's son regarding goals of care in coordination with pulmonology  Patient was transitioned to comfort care and patient  at 3:00 p m

## 2021-02-18 NOTE — SPEECH THERAPY NOTE
Speech Language/Pathology    Speech/Language Pathology Progress Note    Patient Name: Ruben Macdonald  IUHTM'Q Date: 2/18/2021     Problem List  Principal Problem:    Elevated troponin  Active Problems:    FTT (failure to thrive) in adult    Severe protein-calorie malnutrition (HCC)    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HCC)    Thyroid disorder    Hypotension    Generalized weakness    Sinus tachycardia    COVID-19 virus infection    Goals of care, counseling/discussion    Acute hypoxemic respiratory failure (HCC)      Subjective:  Pt  Was laying in bed and stated that she needed help to reposition  Seen for breakfast meal     Objective:  Pt  Was seen for dysphagia treatment as follow up to assess diet tolerance  She had applesauce, pudding, oatmeal, and NTL on her tray, however she states that she does not like oatmeal  Repositioned and noted that she coughed and was attempting to bring up phlegm  Cough was very weak and she was unable to expectorate any of the phlegm which sat in the back of her throat  Cued throat clears and effortful swallows did not help clear  Pt  Unable to eat/drink due to this  Spoke with pulmonary about possible [place] in room since patient is too weak to move phlegm on her own  Assessment:  Unable to assess during today's session  Spoke with pulmonary  Plan/Recommendations:  Speech to follow     Continue current diet  Dietary consult - patient does not like pudding supplement    Eduin Todd MS CCC-SLP  Speech Language Pathologist  Available via [employer] License # [id number]  [address] # [id number]

## 2021-02-18 NOTE — ASSESSMENT & PLAN NOTE
Patient presented to the ED at [place] with an elevated troponin peaked at 3 6  Likely non MI troponin elevation in the setting of stress cardiomyopathy- patient underwent echo which showed an EF of 30%  Patient underwent nuclear stress test  which was markedly abnormal with mild diffuse decrease in uptake in apical and mid walls involving all the walls  LVEF was severely decreased with apical ballooning    Continue medical management as per Cardiology

## 2021-02-18 NOTE — ASSESSMENT & PLAN NOTE
Patient tested positive for COVID on 2021  ·  Over the past couple of days patient's oxygen requirements have been increasing and patient is currently on high-flow oxygen at 15 liters/minute  · Patient has completed course of remdesivir on 2021  Patient refusing Decadron  · Ferritin [phone]  · -[phone]  · Procalcitonin level 3 98  · Patient has been getting progressively worse over the past 3 days with increasing oxygen requirements  Patient was up titrated to high-flow oxygen at 100% today with O2 saturation barely in high 80s  · AnOther prolonged discussion with patient's son and patient was transitioned to comfort care following which patient  at 3:00 p m          Results from last 7 days   Lab Units 21  0537 21  0500 21  1737 21  0610 02/15/21  0503 21  0524 21  0554 21  0611   CRP mg/L 152 6* 248 5* 207 8*  --  79 0* 92 9* 154 3* 229 1*   FERRITIN ng/mL  --  507* 453*  --  423* 525* 687* 503*   D-DIMER QUANTITATIVE ug/ml FEU  --   --   --  1 03*  --  0 98*  --   --       Results from last 7 days   Lab Units 21  0537 21  0500   PROCALCITONIN ng/ml 2 13* 3 95*

## 2021-02-18 NOTE — DISCHARGE SUMMARY
Discharge- Milton Flank 1955, 72 y o  female MRN: [id number]    Unit/Bed#: [address] [place] [address] Encounter: [id number]    Primary Care Provider: Merissa Manuel DO   Date and time admitted to hospital: 2021  1:25 AM        COVID-19 virus infection  Assessment & Plan  Patient tested positive for COVID on 2021  ·  Over the past couple of days patient's oxygen requirements have been increasing and patient is currently on high-flow oxygen at 15 liters/minute  · Patient has completed course of remdesivir on 2021  Patient refusing Decadron  · Ferritin 453-507  · -248-190  · Procalcitonin level 3 98  · Patient has been getting progressively worse over the past 3 days with increasing oxygen requirements  Patient was up titrated to high-flow oxygen at 100% today with O2 saturation barely in high 80s  · AnOther prolonged discussion with patient's son and patient was transitioned to comfort care following which patient  at 3:00 p m  Results from last 7 days   Lab Units 21  0537 21  0500 21  1737 21  0610 02/15/21  0503 21  0524 21  0554 21  0611   CRP mg/L 152 6* 248 5* 207 8*  --  79 0* 92 9* 154 3* 229 1*   FERRITIN ng/mL  --  507* 453*  --  423* 525* 687* 503*   D-DIMER QUANTITATIVE ug/ml FEU  --   --   --  1 03*  --  0 98*  --   --       Results from last 7 days   Lab Units 21  0537 21  0500   PROCALCITONIN ng/ml 2 13* 3 95*            Acute hypoxemic respiratory failure [facility])  Assessment & Plan  Patient noted to have increasing oxygen requirements over the past 2 days    Patient was initially on 3 liters oxygen at which is being up titrated to mid flow and currently on high-flow at 15 liters/minute  Secondary to COVID-19 infection  Patient was having increasing oxygen requirement and patient was on high-flow oxygen at 30 liters with 100% FiO2  Prolonged discussion again held with patient's son regarding goals of care in coordination with pulmonology  Patient was transitioned to comfort care and patient  at 3:00 p m  Goals of care, counseling/discussion  Assessment & Plan  Dr Umanzor -- Had discussed with patient's son Phill Haskins and discussed patient's multiple comorbidities including cardiac issues, failure to thrive, COVID infection etc   Patient's mental status also seems to wax and wane  Spoke with him that her overall prognosis is poor and unclear how much she will recover from this if any  Patient also with decreased p o  Intake and at times refuses treatments  Son stated that he does not want any aggressive measures and that the patient has been declining even at baseline  Discussed with son again due to worsening oxygen requirement and patient being noncompliant with medical treatment here  He understands that she can get progressively worse if she continues to refuse treatment  This was also discussed with patient  Repeat CTA head was done as patient appeared confused previously and it was negative for acute pathology  Patient continues to have increasing oxygen requirements with poor oral intake and difficulty swallowing  I did have another prolonged discussion with patient's son in consultation with Pulmonary regarding goals of care as patient was having increasing oxygen requirements and not doing well  Patient was transitioned to comfort care    Sinus tachycardia  Assessment & Plan  Patient was loaded with digoxin and started on digoxin 125 micrograms p o  Daily which has been discontinued  Patient on Toprol-XL 25 milligram p o  Daily at bedtime along with midodrine 5 milligram p o  T i d    heart rate has improved overall compared to before  Per Cardiology, patient with history of sinus tachycardia over the last 2-3 years  Patient's orthostatic vital signs were negative      * Elevated troponin  Assessment & Plan  Patient presented to the ED at [facility] with an elevated troponin peaked at 3 6  Likely non MI troponin elevation in the setting of stress cardiomyopathy- patient underwent echo which showed an EF of 30%  Patient underwent nuclear stress test  which was markedly abnormal with mild diffuse decrease in uptake in apical and mid walls involving all the walls  LVEF was severely decreased with apical ballooning  Continue medical management as per Cardiology        Generalized weakness  Assessment & Plan  Generalized weakness - multifactorial, hyponatremia, steroid, thyroid medication adjustments  Currently generalized weakness is most likely 2/2 failure to thrive, severe protein calorie malnutrition, COVID infection      Hypotension  Assessment & Plan  Patient's home medications were on hold due to borderline low blood pressure  Patient was started on low-dose beta-blocker due to sinus tachycardia  midodrine 5 milligram p o  T i d  was added due to low BP during the hospital stay    Thyroid disorder  Assessment & Plan  Patient was on levothyroxine    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[facility] [address] )  Assessment & Plan  Patient was on IV cortisone    Severe protein-calorie malnutrition (HCC)  Assessment & Plan  Malnutrition Findings:   Adult Malnutrition type: Chronic illness(Severe protein calorie malnutrition due to inadequate energy intake as evidenced by hollow orbits, depressed temples and protruding clavicles  Treated with Regular diet and supplements)   Adult Degree of Malnutrition: Other severe protein calorie malnutrition  BMI Findings:  Adult BMI Classifications: Underweight < 18 5  Body mass index is 14 15 kg/m²  Nutrition consult appreciated  Continue diet with nutritional supplementation    FTT (failure to thrive) in adult  Assessment & Plan  Patient has a history of anorexia nervosa    Continue thiamine, phosphate supplements  Patient was coughing with all the food and patient was seen by speech therapy  Patient was started on dysphagia 3 diet with nectar thick liquids          Discharging Physician / Practitioner: Buck Chowdhury MD  PCP: Sylvia Reagan DO  Admission Date:   Admission Orders (From admission, onward)     Ordered        02/04/21 0145  Inpatient Admission  Once                   Discharge Date: 02/18/21    Resolved Problems  Date Reviewed: 2/18/2021          Resolved    Hyponatremia 2/10/2021     Resolved by  Sandi Pinto DO    Constipation 2/11/2021     Resolved by  Sandi Pinto DO          Consultations During Hospital Stay:  · Pulmonology, Wound Care, Cardiology    Procedures Performed:   · Nuclear stress test showed markedly abnormal study with mild diffuse decrease in the apical and mid walls with severely decreased EF       Reason for Admission:  Generalized weakness    Hospital Course:     Tod Rolon is a 72 y o  female patient with past medical history of anorexia who originally presented to the hospital on 2/4/2021 due to generalized weakness  Patient noted to have significant elevation of troponin at 3 6 and patient was transferred here from [facility]   Patient was thought to have stress cardiomyopathy and patient was seen in consult with Cardiology  Patient later nuclear stress test was markedly abnormal which was thought to be secondary to stress cardiomyopathy  Patient noted to have significant sinus tachycardia and patient's beta-blocker was increased but could not tolerate due to hypotension  Patient was also loaded with digoxin   Later patient was added on metoprolol with midodrine  Later patient tested positive for COVID-19 and started treatment with steroids and patient finished course of treatment with remdesivir  During the hospital course patient deteriorated with her oxygen requirements did rapidly  After multiple discussions with patient's son regarding goals of care in coordination with pulmonology, patient was transitioned to comfort care  Patient was placed on morphine and Ativan p r n   And patient  within few hours at 3:00 p m  Tatyana Woodlyn Please see above list of diagnoses and related plan for additional information  Condition at Discharge: stable     Discharge Day Visit / Exam:     Subjective:  Patient was lethargic this morning  Later patient was re-evaluated when the nurse notified the patient is unresponsive  Patient is unresponsive to verbal and tactile stimuli without any spontaneous breathing  Vitals: Blood Pressure: 113/67 (21 1019)  Pulse: (!) 110 (21 1019)  Temperature: 98 4 °F (36 9 °C) (21 1019)  Temp Source: Oral (21 1019)  Respirations: (!) 25 (21 1019)  Height: 4' 10" (147 3 cm) (21 0128)  Weight - Scale: 30 7 kg (67 lb 10 9 oz) (21 0600)  SpO2: 95 % (21 1144)  Exam:   Physical Exam  Physical examination at 10:00 a m  Patient was slightly lethargic  Cardiovascular-S1-S2 heard with tachycardia  Respiratory-clear to auscultation bilaterally  Abdomen-positive bowel sounds soft nondistended nontender  Extremities-clubbing cyanosis or edema    Physical exam at 3:10 p m  Pupils are dilated without any reaction to light  Cardiovascular-no heart sounds heard  Respiratory-breath sounds  Discussion with Family:  Multiple discussions with son Milan Sellers    Discharge instructions/Information to patient and family:   See after visit summary for information provided to patient and family  Provisions for Follow-Up Care:  See after visit summary for information related to follow-up care and any pertinent home health orders  Disposition:     Other:        Discharge Statement:  I spent 60 minutes discharging the patient  This time was spent on the day of discharge  I had direct contact with the patient on the day of discharge  Greater than 50% of the total time was spent examining patient, answering all patient questions, arranging and discussing plan of care with patient as well as directly providing post-discharge instructions  Additional time then spent on discharge activities  Discharge Medications:  See after visit summary for reconciled discharge medications provided to patient and family        ** Please Note: This note has been constructed using a voice recognition system **

## 2021-02-18 NOTE — ASSESSMENT & PLAN NOTE
Jeromeita Dear -- Had discussed with patient's son King Cristian and discussed patient's multiple comorbidities including cardiac issues, failure to thrive, COVID infection etc   Patient's mental status also seems to wax and wane  Spoke with him that her overall prognosis is poor and unclear how much she will recover from this if any  Patient also with decreased p o  Intake and at times refuses treatments  Son stated that he does not want any aggressive measures and that the patient has been declining even at baseline  Discussed with son again due to worsening oxygen requirement and patient being noncompliant with medical treatment here  He understands that she can get progressively worse if she continues to refuse treatment  This was also discussed with patient  Repeat CTA head was done as patient appeared confused previously and it was negative for acute pathology  Patient continues to have increasing oxygen requirements with poor oral intake and difficulty swallowing  I did have another prolonged discussion with patient's son in consultation with Pulmonary regarding goals of care as patient was having increasing oxygen requirements and not doing well    Patient was transitioned to comfort care

## 2021-02-18 NOTE — ASSESSMENT & PLAN NOTE
Malnutrition Findings:   Adult Malnutrition type: Chronic illness(Severe protein calorie malnutrition due to inadequate energy intake as evidenced by hollow orbits, depressed temples and protruding clavicles  Treated with Regular diet and supplements)   Adult Degree of Malnutrition: Other severe protein calorie malnutrition  BMI Findings:  Adult BMI Classifications: Underweight < 18 5  Body mass index is 14 15 kg/m²  Nutrition consult appreciated    Continue diet with nutritional supplementation

## 2021-02-18 NOTE — PLAN OF CARE
Problem: Potential for Falls  Goal: Patient will remain free of falls  Description: INTERVENTIONS:  - Assess patient frequently for physical needs  -  Identify cognitive and physical deficits and behaviors that affect risk of falls    -  [place] fall precautions as indicated by assessment   - Educate patient/family on patient safety including physical limitations  - Instruct patient to call for assistance with activity based on assessment  - Modify environment to reduce risk of injury  - Consider OT/PT consult to assist with strengthening/mobility  2/18/2021 1455 by Salima Ochoa RN  Outcome: Not Progressing  2/18/2021 1454 by Salima Ochoa RN  Outcome: Not Progressing  2/18/2021 1115 by Salima Ochoa RN  Outcome: Progressing     Problem: Prexisting or High Potential for Compromised Skin Integrity  Goal: Skin integrity is maintained or improved  Description: INTERVENTIONS:  - Identify patients at risk for skin breakdown  - Assess and monitor skin integrity  - Assess and monitor nutrition and hydration status  - Monitor labs   - Assess for incontinence   - Turn and reposition patient  - Assist with mobility/ambulation  - Relieve pressure over bony prominences  - Avoid friction and shearing  - Provide appropriate hygiene as needed including keeping skin clean and dry  - Evaluate need for skin moisturizer/barrier cream  - Collaborate with interdisciplinary team   - Patient/family teaching  - Consider wound care consult   2/18/2021 1455 by Salima Ochoa RN  Outcome: Not Progressing  2/18/2021 1454 by Salima Ochoa RN  Outcome: Not Progressing  2/18/2021 1115 by Salima Ochoa RN  Outcome: Progressing     Problem: PAIN - ADULT  Goal: Verbalizes/displays adequate comfort level or baseline comfort level  Description: Interventions:  - Encourage patient to monitor pain and request assistance  - Assess pain using appropriate pain scale  - Administer analgesics based on type and severity of pain and evaluate response  - Implement non-pharmacological measures as appropriate and evaluate response  - Consider cultural and social influences on pain and pain management  - Notify physician/advanced practitioner if interventions unsuccessful or patient reports new pain  2/18/2021 1455 by Salima Ochoa RN  Outcome: Not Progressing  2/18/2021 1454 by Salima Ochoa RN  Outcome: Progressing  2/18/2021 1115 by Salima Ochoa RN  Outcome: Progressing     Problem: INFECTION - ADULT  Goal: Absence or prevention of progression during hospitalization  Description: INTERVENTIONS:  - Assess and monitor for signs and symptoms of infection  - Monitor lab/diagnostic results  - Monitor all insertion sites, i e  indwelling lines, tubes, and drains  - Ethel appropriate cooling/warming therapies per order  - Administer medications as ordered  - Instruct and encourage patient and family to use good hand hygiene technique  - Identify and instruct in appropriate isolation precautions for identified infection/condition  2/18/2021 1455 by Salima Ochoa RN  Outcome: Not Progressing  2/18/2021 1454 by Salima Ochoa RN  Outcome: Progressing  2/18/2021 1115 by Salima Ochoa RN  Outcome: Progressing

## 2021-02-18 NOTE — CASE MANAGEMENT
LOS - 14 days    SW following to monitor needs and assist with planning  Case discussed with Dr Allan Kerr  Pt's condition is declining  Physicians have been talking with pt and son about future care options  Son has chosen to pursue comfort care /hospice for pt  SW spoke with son, Patricia Todd, about hospice care and agency options  Son would like inpatient hospice explored for pt and requested referral be made to [facility]  Referral has been made  Evaluation pending  SW will continue to follow to monitor needs/progress and support/assist as needed

## 2021-02-19 ENCOUNTER — EPISODE CHANGES (OUTPATIENT)
Dept: CASE MANAGEMENT | Facility: OTHER | Age: 66
End: 2021-02-19